# Patient Record
Sex: FEMALE | Race: WHITE | NOT HISPANIC OR LATINO | Employment: FULL TIME | URBAN - METROPOLITAN AREA
[De-identification: names, ages, dates, MRNs, and addresses within clinical notes are randomized per-mention and may not be internally consistent; named-entity substitution may affect disease eponyms.]

---

## 2020-11-20 ENCOUNTER — LAB (OUTPATIENT)
Dept: LAB | Facility: HOSPITAL | Age: 55
End: 2020-11-20

## 2020-11-20 ENCOUNTER — OFFICE VISIT (OUTPATIENT)
Dept: INTERNAL MEDICINE | Facility: CLINIC | Age: 55
End: 2020-11-20

## 2020-11-20 VITALS
HEART RATE: 90 BPM | OXYGEN SATURATION: 97 % | DIASTOLIC BLOOD PRESSURE: 102 MMHG | WEIGHT: 268.6 LBS | HEIGHT: 69 IN | BODY MASS INDEX: 39.78 KG/M2 | SYSTOLIC BLOOD PRESSURE: 172 MMHG | TEMPERATURE: 97.5 F

## 2020-11-20 DIAGNOSIS — G47.30 SLEEP APNEA WITH USE OF NOCTURNAL BILEVEL POSITIVE AIRWAY PRESSURE (BPAP): ICD-10-CM

## 2020-11-20 DIAGNOSIS — E78.5 HYPERLIPIDEMIA, UNSPECIFIED HYPERLIPIDEMIA TYPE: ICD-10-CM

## 2020-11-20 DIAGNOSIS — R73.09 ABNORMAL GLUCOSE: ICD-10-CM

## 2020-11-20 DIAGNOSIS — G62.9 PERIPHERAL POLYNEUROPATHY: ICD-10-CM

## 2020-11-20 DIAGNOSIS — F32.A ANXIETY AND DEPRESSION: ICD-10-CM

## 2020-11-20 DIAGNOSIS — R25.2 LEG CRAMPS: ICD-10-CM

## 2020-11-20 DIAGNOSIS — G25.81 RESTLESS LEG SYNDROME: ICD-10-CM

## 2020-11-20 DIAGNOSIS — Z23 NEED FOR INFLUENZA VACCINATION: ICD-10-CM

## 2020-11-20 DIAGNOSIS — I10 ESSENTIAL HYPERTENSION: Primary | ICD-10-CM

## 2020-11-20 DIAGNOSIS — Z79.899 ENCOUNTER FOR LONG-TERM (CURRENT) USE OF MEDICATIONS: ICD-10-CM

## 2020-11-20 DIAGNOSIS — F41.9 ANXIETY AND DEPRESSION: ICD-10-CM

## 2020-11-20 LAB
ALBUMIN SERPL-MCNC: 4.4 G/DL (ref 3.5–5.2)
ALBUMIN/GLOB SERPL: 1.3 G/DL
ALP SERPL-CCNC: 108 U/L (ref 39–117)
ALT SERPL W P-5'-P-CCNC: 29 U/L (ref 1–33)
AMPHET+METHAMPHET UR QL: NEGATIVE
AMPHETAMINES UR QL: NEGATIVE
ANION GAP SERPL CALCULATED.3IONS-SCNC: 7.2 MMOL/L (ref 5–15)
AST SERPL-CCNC: 26 U/L (ref 1–32)
BARBITURATES UR QL SCN: NEGATIVE
BASOPHILS # BLD AUTO: 0.08 10*3/MM3 (ref 0–0.2)
BASOPHILS NFR BLD AUTO: 1.2 % (ref 0–1.5)
BENZODIAZ UR QL SCN: NEGATIVE
BILIRUB SERPL-MCNC: 0.6 MG/DL (ref 0–1.2)
BUN SERPL-MCNC: 9 MG/DL (ref 6–20)
BUN/CREAT SERPL: 12.9 (ref 7–25)
BUPRENORPHINE SERPL-MCNC: NEGATIVE NG/ML
CALCIUM SPEC-SCNC: 9.2 MG/DL (ref 8.6–10.5)
CANNABINOIDS SERPL QL: NEGATIVE
CHLORIDE SERPL-SCNC: 98 MMOL/L (ref 98–107)
CHOLEST SERPL-MCNC: 231 MG/DL (ref 0–200)
CO2 SERPL-SCNC: 33.8 MMOL/L (ref 22–29)
COCAINE UR QL: NEGATIVE
CREAT SERPL-MCNC: 0.7 MG/DL (ref 0.57–1)
DEPRECATED RDW RBC AUTO: 36.2 FL (ref 37–54)
EOSINOPHIL # BLD AUTO: 0.44 10*3/MM3 (ref 0–0.4)
EOSINOPHIL NFR BLD AUTO: 6.4 % (ref 0.3–6.2)
ERYTHROCYTE [DISTWIDTH] IN BLOOD BY AUTOMATED COUNT: 12.6 % (ref 12.3–15.4)
GFR SERPL CREATININE-BSD FRML MDRD: 87 ML/MIN/1.73
GLOBULIN UR ELPH-MCNC: 3.5 GM/DL
GLUCOSE SERPL-MCNC: 86 MG/DL (ref 65–99)
HBA1C MFR BLD: 5.72 % (ref 4.8–5.6)
HCT VFR BLD AUTO: 41.5 % (ref 34–46.6)
HDLC SERPL-MCNC: 42 MG/DL (ref 40–60)
HGB BLD-MCNC: 14.1 G/DL (ref 12–15.9)
IMM GRANULOCYTES # BLD AUTO: 0.01 10*3/MM3 (ref 0–0.05)
IMM GRANULOCYTES NFR BLD AUTO: 0.1 % (ref 0–0.5)
LDLC SERPL CALC-MCNC: 167 MG/DL (ref 0–100)
LDLC/HDLC SERPL: 3.91 {RATIO}
LYMPHOCYTES # BLD AUTO: 1.86 10*3/MM3 (ref 0.7–3.1)
LYMPHOCYTES NFR BLD AUTO: 27.2 % (ref 19.6–45.3)
MAGNESIUM SERPL-MCNC: 2.1 MG/DL (ref 1.6–2.6)
MCH RBC QN AUTO: 27.2 PG (ref 26.6–33)
MCHC RBC AUTO-ENTMCNC: 34 G/DL (ref 31.5–35.7)
MCV RBC AUTO: 80.1 FL (ref 79–97)
METHADONE UR QL SCN: NEGATIVE
MONOCYTES # BLD AUTO: 0.44 10*3/MM3 (ref 0.1–0.9)
MONOCYTES NFR BLD AUTO: 6.4 % (ref 5–12)
NEUTROPHILS NFR BLD AUTO: 4.02 10*3/MM3 (ref 1.7–7)
NEUTROPHILS NFR BLD AUTO: 58.7 % (ref 42.7–76)
NRBC BLD AUTO-RTO: 0 /100 WBC (ref 0–0.2)
OPIATES UR QL: NEGATIVE
OXYCODONE UR QL SCN: NEGATIVE
PCP UR QL SCN: NEGATIVE
PLATELET # BLD AUTO: 271 10*3/MM3 (ref 140–450)
PMV BLD AUTO: 10.7 FL (ref 6–12)
POTASSIUM SERPL-SCNC: 3.5 MMOL/L (ref 3.5–5.2)
PROPOXYPH UR QL: NEGATIVE
PROT SERPL-MCNC: 7.9 G/DL (ref 6–8.5)
RBC # BLD AUTO: 5.18 10*6/MM3 (ref 3.77–5.28)
SODIUM SERPL-SCNC: 139 MMOL/L (ref 136–145)
T4 FREE SERPL-MCNC: 0.91 NG/DL (ref 0.93–1.7)
TRICYCLICS UR QL SCN: NEGATIVE
TRIGL SERPL-MCNC: 123 MG/DL (ref 0–150)
TSH SERPL DL<=0.05 MIU/L-ACNC: 1.94 UIU/ML (ref 0.27–4.2)
VIT B12 BLD-MCNC: 410 PG/ML (ref 211–946)
VLDLC SERPL-MCNC: 22 MG/DL (ref 5–40)
WBC # BLD AUTO: 6.85 10*3/MM3 (ref 3.4–10.8)

## 2020-11-20 PROCEDURE — 83735 ASSAY OF MAGNESIUM: CPT | Performed by: FAMILY MEDICINE

## 2020-11-20 PROCEDURE — 82607 VITAMIN B-12: CPT | Performed by: FAMILY MEDICINE

## 2020-11-20 PROCEDURE — 85025 COMPLETE CBC W/AUTO DIFF WBC: CPT | Performed by: FAMILY MEDICINE

## 2020-11-20 PROCEDURE — 80053 COMPREHEN METABOLIC PANEL: CPT | Performed by: FAMILY MEDICINE

## 2020-11-20 PROCEDURE — 84443 ASSAY THYROID STIM HORMONE: CPT | Performed by: FAMILY MEDICINE

## 2020-11-20 PROCEDURE — 80306 DRUG TEST PRSMV INSTRMNT: CPT | Performed by: FAMILY MEDICINE

## 2020-11-20 PROCEDURE — 90471 IMMUNIZATION ADMIN: CPT | Performed by: FAMILY MEDICINE

## 2020-11-20 PROCEDURE — 80061 LIPID PANEL: CPT | Performed by: FAMILY MEDICINE

## 2020-11-20 PROCEDURE — 80171 DRUG SCREEN QUANT GABAPENTIN: CPT | Performed by: FAMILY MEDICINE

## 2020-11-20 PROCEDURE — 83036 HEMOGLOBIN GLYCOSYLATED A1C: CPT | Performed by: FAMILY MEDICINE

## 2020-11-20 PROCEDURE — 90686 IIV4 VACC NO PRSV 0.5 ML IM: CPT | Performed by: FAMILY MEDICINE

## 2020-11-20 PROCEDURE — 84207 ASSAY OF VITAMIN B-6: CPT | Performed by: FAMILY MEDICINE

## 2020-11-20 PROCEDURE — 84439 ASSAY OF FREE THYROXINE: CPT | Performed by: FAMILY MEDICINE

## 2020-11-20 PROCEDURE — 99203 OFFICE O/P NEW LOW 30 MIN: CPT | Performed by: FAMILY MEDICINE

## 2020-11-20 RX ORDER — LOVASTATIN 40 MG/1
40 TABLET ORAL NIGHTLY
Qty: 30 TABLET | Refills: 3 | Status: SHIPPED | OUTPATIENT
Start: 2020-11-20 | End: 2021-03-12 | Stop reason: SDUPTHER

## 2020-11-20 RX ORDER — GABAPENTIN 800 MG/1
800 TABLET ORAL 2 TIMES DAILY
COMMUNITY
End: 2020-11-20 | Stop reason: DRUGHIGH

## 2020-11-20 RX ORDER — VENLAFAXINE HYDROCHLORIDE 150 MG/1
150 CAPSULE, EXTENDED RELEASE ORAL DAILY
COMMUNITY
End: 2020-11-20 | Stop reason: SDUPTHER

## 2020-11-20 RX ORDER — AMLODIPINE AND VALSARTAN 5; 160 MG/1; MG/1
1 TABLET ORAL DAILY
COMMUNITY
End: 2020-11-20 | Stop reason: SDUPTHER

## 2020-11-20 RX ORDER — GABAPENTIN 600 MG/1
600 TABLET ORAL 2 TIMES DAILY
Qty: 60 TABLET | Refills: 0 | Status: SHIPPED | OUTPATIENT
Start: 2020-11-20 | End: 2020-12-04 | Stop reason: SDUPTHER

## 2020-11-20 RX ORDER — HYDROCHLOROTHIAZIDE 25 MG/1
25 TABLET ORAL DAILY
Qty: 30 TABLET | Refills: 3 | Status: SHIPPED | OUTPATIENT
Start: 2020-11-20 | End: 2021-03-12 | Stop reason: SDUPTHER

## 2020-11-20 RX ORDER — ARSENIC TRIOXIDE, LYCOPODIUM CLAVATUM SPORE, PULSATILLA VULGARIS, TOXICODENDRON PUBESCENS LEAF, SULFUR, AND ZINC 12; 6; 6; 6; 6; 12 [HP_X]/1; [HP_X]/1; [HP_X]/1; [HP_X]/1; [HP_X]/1; [HP_X]/1
TABLET, SOLUBLE ORAL
COMMUNITY
End: 2021-11-10

## 2020-11-20 RX ORDER — NIFEDIPINE 30 MG/1
30 TABLET, EXTENDED RELEASE ORAL DAILY
COMMUNITY
End: 2020-11-20

## 2020-11-20 RX ORDER — AMLODIPINE AND VALSARTAN 5; 160 MG/1; MG/1
1 TABLET ORAL DAILY
Qty: 30 TABLET | Refills: 3 | Status: SHIPPED | OUTPATIENT
Start: 2020-11-20 | End: 2020-11-24

## 2020-11-20 RX ORDER — VENLAFAXINE HYDROCHLORIDE 150 MG/1
150 CAPSULE, EXTENDED RELEASE ORAL DAILY
Qty: 30 CAPSULE | Refills: 3 | Status: SHIPPED | OUTPATIENT
Start: 2020-11-20 | End: 2021-03-12 | Stop reason: SDUPTHER

## 2020-11-20 RX ORDER — CETIRIZINE HYDROCHLORIDE 10 MG/1
10 TABLET ORAL DAILY
COMMUNITY
End: 2022-10-10 | Stop reason: ALTCHOICE
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
## 2020-11-23 LAB — GABAPENTIN SERPLBLD-MCNC: 3.1 UG/ML (ref 4–16)

## 2020-11-24 ENCOUNTER — TELEPHONE (OUTPATIENT)
Dept: INTERNAL MEDICINE | Facility: CLINIC | Age: 55
End: 2020-11-24

## 2020-11-24 DIAGNOSIS — I10 ESSENTIAL HYPERTENSION: Primary | ICD-10-CM

## 2020-11-24 RX ORDER — AMLODIPINE BESYLATE 5 MG/1
5 TABLET ORAL DAILY
Qty: 30 TABLET | Refills: 3 | Status: SHIPPED | OUTPATIENT
Start: 2020-11-24 | End: 2020-12-04 | Stop reason: DRUGHIGH

## 2020-11-24 RX ORDER — VALSARTAN 160 MG/1
160 TABLET ORAL DAILY
Qty: 30 TABLET | Refills: 3 | Status: SHIPPED | OUTPATIENT
Start: 2020-11-24 | End: 2021-03-12 | Stop reason: DRUGHIGH

## 2020-11-24 NOTE — TELEPHONE ENCOUNTER
PA for exforge was sent to insurance. In between time she doesn't have anything to take and doesn't want to go too high. Wondered if we could split it and see if insurance will pay for them separately. Please send in rx to pharmacy. Thanks

## 2020-11-24 NOTE — TELEPHONE ENCOUNTER
PT IS CHECKING ON THE STATUS OF PRIOR AUTHORIZATION    amLODIPine-valsartan (EXFORGE) 5-160 MG per tablet      PT STATES THAT SHE IS  HAVING HEADACHES AND REALLY NEEDS MEDICATION.    PLEASE ADVISE  [phone]

## 2020-11-25 NOTE — TELEPHONE ENCOUNTER
LVM that rxs were sent and that we received today the approval for the exforge(combination). Advised she could either take both amlodipine and valsartan or the exforge. Which ever would be fine.

## 2020-11-30 LAB — VIT B6 SERPL-MCNC: 14.4 UG/L (ref 2–32.8)

## 2020-12-04 ENCOUNTER — OFFICE VISIT (OUTPATIENT)
Dept: INTERNAL MEDICINE | Facility: CLINIC | Age: 55
End: 2020-12-04

## 2020-12-04 VITALS
SYSTOLIC BLOOD PRESSURE: 170 MMHG | BODY MASS INDEX: 39.58 KG/M2 | TEMPERATURE: 97.5 F | DIASTOLIC BLOOD PRESSURE: 100 MMHG | WEIGHT: 267.2 LBS | HEART RATE: 92 BPM | HEIGHT: 69 IN | OXYGEN SATURATION: 96 %

## 2020-12-04 DIAGNOSIS — I10 ESSENTIAL HYPERTENSION: Primary | ICD-10-CM

## 2020-12-04 DIAGNOSIS — G25.81 RESTLESS LEG SYNDROME: ICD-10-CM

## 2020-12-04 DIAGNOSIS — Z12.31 ENCOUNTER FOR SCREENING MAMMOGRAM FOR MALIGNANT NEOPLASM OF BREAST: ICD-10-CM

## 2020-12-04 DIAGNOSIS — G62.9 PERIPHERAL POLYNEUROPATHY: ICD-10-CM

## 2020-12-04 PROCEDURE — 99214 OFFICE O/P EST MOD 30 MIN: CPT | Performed by: FAMILY MEDICINE

## 2020-12-04 RX ORDER — GABAPENTIN 600 MG/1
600 TABLET ORAL 2 TIMES DAILY
Qty: 60 TABLET | Refills: 2 | Status: SHIPPED | OUTPATIENT
Start: 2020-12-04 | End: 2021-03-12 | Stop reason: SDUPTHER

## 2020-12-04 RX ORDER — AMLODIPINE BESYLATE 10 MG/1
10 TABLET ORAL DAILY
Qty: 30 TABLET | Refills: 3 | Status: SHIPPED | OUTPATIENT
Start: 2020-12-04 | End: 2021-03-12 | Stop reason: SDUPTHER

## 2020-12-04 NOTE — PROGRESS NOTES
"Irineo Dominguez is a 55 y.o. female.     Chief Complaint   Patient presents with   • Hypertension   • Follow-up     discuss labs       Visit Vitals  /100 (BP Location: Right arm, Patient Position: Sitting, Cuff Size: Large Adult)   Pulse 92   Temp 97.5 °F (36.4 °C) (Infrared)   Ht 175.3 cm (69\")   Wt 121 kg (267 lb 3.2 oz)   SpO2 96%   BMI 39.46 kg/m²         Hypertension  This is a chronic problem. The current episode started more than 1 year ago. The problem is unchanged. The problem is uncontrolled. Pertinent negatives include no anxiety, blurred vision, chest pain, headaches, malaise/fatigue, neck pain, orthopnea, palpitations, peripheral edema, PND, shortness of breath or sweats. There are no associated agents to hypertension. Risk factors for coronary artery disease include obesity. Current antihypertension treatment includes angiotensin blockers, calcium channel blockers and diuretics. The current treatment provides moderate improvement. There are no compliance problems.  There is no history of angina, kidney disease, CAD/MI, CVA, heart failure, left ventricular hypertrophy, PVD or retinopathy. Identifiable causes of hypertension include sleep apnea. There is no history of a thyroid problem.   Neurologic Problem  The patient's pertinent negatives include no altered mental status, clumsiness, focal sensory loss, focal weakness, loss of balance, memory loss, near-syncope, slurred speech, syncope, visual change or weakness. Primary symptoms comment: peripheral neuropathy in feet. This is a chronic problem. The current episode started more than 1 year ago. The problem is unchanged. There was no focality noted. Pertinent negatives include no abdominal pain, auditory change, aura, back pain, bladder incontinence, bowel incontinence, chest pain, confusion, diaphoresis, dizziness, fatigue, fever, headaches, light-headedness, nausea, neck pain, palpitations, shortness of breath, vertigo or vomiting. " Treatments tried: gabapentin. The treatment provided significant relief. There is no history of a bleeding disorder, a clotting disorder, a CVA, dementia, head trauma, liver disease, mood changes or seizures.        The following portions of the patient's history were reviewed and updated as appropriate: allergies, current medications, past family history, past medical history, past social history, past surgical history and problem list.    Past Medical History:   Diagnosis Date   • Arthritis    • Hypertension    • Restless leg syndrome    • Sleep apnea       Past Surgical History:   Procedure Laterality Date   • CERVICAL CONE BIOPSY     •  SECTION     • COLONOSCOPY  2019    [facility]   • COLPOSCOPY     • COSMETIC SURGERY  1973   • ENDOMETRIAL ABLATION     • ENDOMETRIAL ABLATION  2019    [facility]   • KNEE CARTILAGE SURGERY Right 2018   • TUBAL ABDOMINAL LIGATION        Family History   Problem Relation Age of Onset   • Arthritis Mother    • Diabetes Mother    • Heart attack Mother    • Hypertension Mother    • Obesity Mother    • Migraines Mother    • Migraines Brother    • Stroke Brother    • Aneurysm Brother         brain   • Diabetes Maternal Grandmother    • Stroke Maternal Grandmother       Social History     Socioeconomic History   • Marital status: Single     Spouse name: Not on file   • Number of children: Not on file   • Years of education: Not on file   • Highest education level: Not on file   Tobacco Use   • Smoking status: Never Smoker   • Smokeless tobacco: Never Used   Substance and Sexual Activity   • Alcohol use: Yes   • Drug use: Never      No Known Allergies    Review of Systems   Constitutional: Negative.  Negative for chills, diaphoresis, fatigue, fever and malaise/fatigue.   HENT: Negative.  Negative for ear pain, nosebleeds, postnasal drip, rhinorrhea, sinus pressure, sneezing and sore throat.    Eyes: Negative.  Negative for blurred vision, redness and  itching.   Respiratory: Positive for apnea (using CPAP). Negative for cough, shortness of breath and wheezing.    Cardiovascular: Negative.  Negative for chest pain, palpitations, orthopnea, PND and near-syncope.   Gastrointestinal: Negative.  Negative for abdominal pain, bowel incontinence, constipation, diarrhea, nausea and vomiting.   Endocrine: Negative.  Negative for cold intolerance and heat intolerance.   Genitourinary: Negative.  Negative for bladder incontinence, dysuria, frequency, hematuria and urgency.   Musculoskeletal: Negative.  Negative for arthralgias, back pain and neck pain.   Skin: Negative.  Negative for color change and rash.   Allergic/Immunologic: Negative.  Negative for environmental allergies.   Neurological: Negative.  Negative for dizziness, vertigo, focal weakness, syncope, weakness, light-headedness, headaches and loss of balance.   Hematological: Negative.  Negative for adenopathy. Does not bruise/bleed easily.   Psychiatric/Behavioral: Negative.  Negative for confusion, dysphoric mood, memory loss and sleep disturbance. The patient is not nervous/anxious.      PHQ-2 Depression Screening  Little interest or pleasure in doing things? 0   Feeling down, depressed, or hopeless? 0   PHQ-2 Total Score 0       Pt has restarted her mevacor after labs came back.   Objective   Physical Exam  Vitals signs and nursing note reviewed.   Constitutional:       Appearance: She is well-developed.   HENT:      Head: Normocephalic.      Right Ear: External ear normal.      Left Ear: External ear normal.      Nose: Nose normal.   Eyes:      General: Lids are normal.      Conjunctiva/sclera: Conjunctivae normal.      Pupils: Pupils are equal, round, and reactive to light.   Neck:      Musculoskeletal: Normal range of motion and neck supple.      Thyroid: No thyroid mass or thyromegaly.      Vascular: No carotid bruit.      Trachea: Trachea normal.   Cardiovascular:      Rate and Rhythm: Normal rate and  regular rhythm.      Heart sounds: No murmur.   Pulmonary:      Effort: Pulmonary effort is normal. No respiratory distress.      Breath sounds: Normal breath sounds. No decreased breath sounds, wheezing, rhonchi or rales.   Chest:      Chest wall: No tenderness.   Abdominal:      General: Bowel sounds are normal.      Palpations: Abdomen is soft.      Tenderness: There is no abdominal tenderness.   Musculoskeletal: Normal range of motion.   Skin:     General: Skin is warm and dry.   Neurological:      Mental Status: She is alert and oriented to person, place, and time.   Psychiatric:         Behavior: Behavior normal.         Assessment/Plan   Diagnoses and all orders for this visit:    1. Essential hypertension (Primary)  -     amLODIPine (NORVASC) 10 MG tablet; Take 1 tablet by mouth Daily.  Dispense: 30 tablet; Refill: 3    2. Peripheral polyneuropathy  -     gabapentin (NEURONTIN) 600 MG tablet; Take 1 tablet by mouth 2 (Two) Times a Day.  Dispense: 60 tablet; Refill: 2    3. Restless leg syndrome  -     gabapentin (NEURONTIN) 600 MG tablet; Take 1 tablet by mouth 2 (Two) Times a Day.  Dispense: 60 tablet; Refill: 2    4. Encounter for screening mammogram for malignant neoplasm of breast  -     Mammo Screening Digital Tomosynthesis Bilateral With CAD; Future        Increase amlodipine to 10 mg per day           Current Outpatient Medications:   •  ASPIRIN 81 PO, Take  by mouth., Disp: , Rfl:   •  cetirizine (zyrTEC) 10 MG tablet, Take 10 mg by mouth Daily., Disp: , Rfl:   •  fluticasone (VERAMYST) 27.5 MCG/SPRAY nasal spray, 2 sprays into the nostril(s) as directed by provider Daily., Disp: , Rfl:   •  gabapentin (NEURONTIN) 600 MG tablet, Take 1 tablet by mouth 2 (Two) Times a Day., Disp: 60 tablet, Rfl: 2  •  Homeopathic Products (LEG CRAMPS PO), Take  by mouth., Disp: , Rfl:   •  Homeopathic Products (Restful Legs) sublingual tablet, Place  under the tongue., Disp: , Rfl:   •  hydroCHLOROthiazide  (HYDRODIURIL) 25 MG tablet, Take 1 tablet by mouth Daily., Disp: 30 tablet, Rfl: 3  •  lovastatin (Mevacor) 40 MG tablet, Take 1 tablet by mouth Every Night., Disp: 30 tablet, Rfl: 3  •  Potassium 99 MG tablet, Take  by mouth., Disp: , Rfl:   •  Prenatal MV-Min-Fe Fum-FA-DHA (PRENATAL 1 PO), Take  by mouth., Disp: , Rfl:   •  valsartan (Diovan) 160 MG tablet, Take 1 tablet by mouth Daily., Disp: 30 tablet, Rfl: 3  •  venlafaxine XR (EFFEXOR-XR) 150 MG 24 hr capsule, Take 1 capsule by mouth Daily., Disp: 30 capsule, Rfl: 3  •  amLODIPine (NORVASC) 10 MG tablet, Take 1 tablet by mouth Daily., Disp: 30 tablet, Rfl: 3    Return in about 3 months (around 3/4/2021), or if symptoms worsen or fail to improve, for Recheck, Annual, recheck BP 2 weeks with nurse.     Recent Results (from the past 1008 hour(s))   Urine Drug Screen - Urine, Clean Catch    Collection Time: 11/20/20 11:44 AM    Specimen: Urine, Clean Catch   Result Value Ref Range    THC, Screen, Urine Negative Negative    Phencyclidine (PCP), Urine Negative Negative    Cocaine Screen, Urine Negative Negative    Methamphetamine, Ur Negative Negative    Opiate Screen Negative Negative    Amphetamine Screen, Urine Negative Negative    Benzodiazepine Screen, Urine Negative Negative    Tricyclic Antidepressants Screen Negative Negative    Methadone Screen, Urine Negative Negative    Barbiturates Screen, Urine Negative Negative    Oxycodone Screen, Urine Negative Negative    Propoxyphene Screen Negative Negative    Buprenorphine, Screen, Urine Negative Negative   Comprehensive Metabolic Panel    Collection Time: 11/20/20 11:44 AM    Specimen: Blood   Result Value Ref Range    Glucose 86 65 - 99 mg/dL    BUN 9 6 - 20 mg/dL    Creatinine 0.70 0.57 - 1.00 mg/dL    Sodium 139 136 - 145 mmol/L    Potassium 3.5 3.5 - 5.2 mmol/L    Chloride 98 98 - 107 mmol/L    CO2 33.8 (H) 22.0 - 29.0 mmol/L    Calcium 9.2 8.6 - 10.5 mg/dL    Total Protein 7.9 6.0 - 8.5 g/dL    Albumin 4.40  3.50 - 5.20 g/dL    ALT (SGPT) 29 1 - 33 U/L    AST (SGOT) 26 1 - 32 U/L    Alkaline Phosphatase 108 39 - 117 U/L    Total Bilirubin 0.6 0.0 - 1.2 mg/dL    eGFR Non African Amer 87 >60 mL/min/1.73    Globulin 3.5 gm/dL    A/G Ratio 1.3 g/dL    BUN/Creatinine Ratio 12.9 7.0 - 25.0    Anion Gap 7.2 5.0 - 15.0 mmol/L   Lipid Panel    Collection Time: 11/20/20 11:44 AM    Specimen: Blood   Result Value Ref Range    Total Cholesterol 231 (H) 0 - 200 mg/dL    Triglycerides 123 0 - 150 mg/dL    HDL Cholesterol 42 40 - 60 mg/dL    LDL Cholesterol  167 (H) 0 - 100 mg/dL    VLDL Cholesterol 22 5 - 40 mg/dL    LDL/HDL Ratio 3.91    TSH    Collection Time: 11/20/20 11:44 AM    Specimen: Blood   Result Value Ref Range    TSH 1.940 0.270 - 4.200 uIU/mL   Vitamin B12    Collection Time: 11/20/20 11:44 AM    Specimen: Blood   Result Value Ref Range    Vitamin B-12 410 211 - 946 pg/mL   Vitamin B6    Collection Time: 11/20/20 11:44 AM    Specimen: Blood   Result Value Ref Range    Vitamin B6 14.4 2.0 - 32.8 ug/L   T4, Free    Collection Time: 11/20/20 11:44 AM    Specimen: Blood   Result Value Ref Range    Free T4 0.91 (L) 0.93 - 1.70 ng/dL   Hemoglobin A1c    Collection Time: 11/20/20 11:44 AM    Specimen: Blood   Result Value Ref Range    Hemoglobin A1C 5.72 (H) 4.80 - 5.60 %   Gabapentin level    Collection Time: 11/20/20 11:44 AM    Specimen: Blood   Result Value Ref Range    Gabapentin 3.1 (L) 4.0 - 16.0 ug/mL   Magnesium    Collection Time: 11/20/20 11:44 AM    Specimen: Blood   Result Value Ref Range    Magnesium 2.1 1.6 - 2.6 mg/dL   CBC Auto Differential    Collection Time: 11/20/20 11:44 AM    Specimen: Blood   Result Value Ref Range    WBC 6.85 3.40 - 10.80 10*3/mm3    RBC 5.18 3.77 - 5.28 10*6/mm3    Hemoglobin 14.1 12.0 - 15.9 g/dL    Hematocrit 41.5 34.0 - 46.6 %    MCV 80.1 79.0 - 97.0 fL    MCH 27.2 26.6 - 33.0 pg    MCHC 34.0 31.5 - 35.7 g/dL    RDW 12.6 12.3 - 15.4 %    RDW-SD 36.2 (L) 37.0 - 54.0 fl    MPV 10.7  6.0 - 12.0 fL    Platelets 271 140 - 450 10*3/mm3    Neutrophil % 58.7 42.7 - 76.0 %    Lymphocyte % 27.2 19.6 - 45.3 %    Monocyte % 6.4 5.0 - 12.0 %    Eosinophil % 6.4 (H) 0.3 - 6.2 %    Basophil % 1.2 0.0 - 1.5 %    Immature Grans % 0.1 0.0 - 0.5 %    Neutrophils, Absolute 4.02 1.70 - 7.00 10*3/mm3    Lymphocytes, Absolute 1.86 0.70 - 3.10 10*3/mm3    Monocytes, Absolute 0.44 0.10 - 0.90 10*3/mm3    Eosinophils, Absolute 0.44 (H) 0.00 - 0.40 10*3/mm3    Basophils, Absolute 0.08 0.00 - 0.20 10*3/mm3    Immature Grans, Absolute 0.01 0.00 - 0.05 10*3/mm3    nRBC 0.0 0.0 - 0.2 /100 WBC

## 2020-12-06 ENCOUNTER — HOSPITAL ENCOUNTER (EMERGENCY)
Facility: HOSPITAL | Age: 55
Discharge: HOME | End: 2020-12-06
Attending: FAMILY MEDICINE
Payer: MEDICAID

## 2020-12-06 VITALS
RESPIRATION RATE: 16 BRPM | OXYGEN SATURATION: 97 % | HEIGHT: 71 IN | HEART RATE: 79 BPM | DIASTOLIC BLOOD PRESSURE: 85 MMHG | BODY MASS INDEX: 37.52 KG/M2 | TEMPERATURE: 98.2 F | SYSTOLIC BLOOD PRESSURE: 165 MMHG | WEIGHT: 268 LBS

## 2020-12-06 PROCEDURE — 12001 RPR S/N/AX/GEN/TRNK 2.5CM/<: CPT

## 2020-12-06 PROCEDURE — 2500000003 HC RX 250 WO HCPCS: Performed by: FAMILY MEDICINE

## 2020-12-06 PROCEDURE — 99282 EMERGENCY DEPT VISIT SF MDM: CPT

## 2020-12-06 RX ORDER — LOVASTATIN 40 MG/1
40 TABLET ORAL NIGHTLY
COMMUNITY

## 2020-12-06 RX ORDER — HYDROCHLOROTHIAZIDE 25 MG/1
25 TABLET ORAL DAILY
COMMUNITY

## 2020-12-06 RX ORDER — GABAPENTIN 600 MG/1
600 TABLET ORAL 2 TIMES DAILY
COMMUNITY

## 2020-12-06 RX ORDER — AMLODIPINE BESYLATE 10 MG/1
10 TABLET ORAL DAILY
COMMUNITY

## 2020-12-06 RX ORDER — LIDOCAINE HYDROCHLORIDE 10 MG/ML
5 INJECTION, SOLUTION INFILTRATION; PERINEURAL ONCE
Status: COMPLETED | OUTPATIENT
Start: 2020-12-06 | End: 2020-12-06

## 2020-12-06 RX ORDER — VALSARTAN 160 MG/1
160 TABLET ORAL DAILY
COMMUNITY

## 2020-12-06 RX ORDER — VENLAFAXINE HYDROCHLORIDE 150 MG/1
150 CAPSULE, EXTENDED RELEASE ORAL DAILY
COMMUNITY

## 2020-12-06 RX ADMIN — LIDOCAINE HYDROCHLORIDE 5 ML: 10 INJECTION, SOLUTION INFILTRATION; PERINEURAL at 16:15

## 2020-12-06 ASSESSMENT — PAIN DESCRIPTION - FREQUENCY: FREQUENCY: CONTINUOUS

## 2020-12-06 ASSESSMENT — PAIN DESCRIPTION - LOCATION: LOCATION: FINGER (COMMENT WHICH ONE)

## 2020-12-06 ASSESSMENT — PAIN DESCRIPTION - PAIN TYPE: TYPE: ACUTE PAIN

## 2020-12-06 ASSESSMENT — PAIN SCALES - GENERAL
PAINLEVEL_OUTOF10: 7
PAINLEVEL_OUTOF10: 7

## 2020-12-06 ASSESSMENT — PAIN DESCRIPTION - DESCRIPTORS: DESCRIPTORS: THROBBING

## 2020-12-06 ASSESSMENT — PAIN DESCRIPTION - ORIENTATION: ORIENTATION: RIGHT

## 2020-12-06 NOTE — ED PROVIDER NOTES
cardiologist.        RADIOLOGY:   Non-plain film images such as CT, Ultrasound and MRI are read by the radiologist. Paulina Glassing images are visualized and preliminarily interpreted by the emergency physician with the below findings:        Interpretation per the Radiologist below, if available at the time of this note:    No orders to display         ED BEDSIDE ULTRASOUND:   Performed by ED Physician - none    LABS:  Labs Reviewed - No data to display    All other labs were within normal range or not returned as of this dictation. EMERGENCY DEPARTMENT COURSE and DIFFERENTIALDIAGNOSIS/MDM:   Vitals:    Vitals:    12/06/20 1540   BP: (!) 165/85   Pulse: 79   Resp: 16   Temp: 98.2 °F (36.8 °C)   TempSrc: Oral   SpO2: 97%   Weight: 268 lb (121.6 kg)   Height: 5' 11\" (1.803 m)           CRITICALCARE TIME   Total Critical Care time was 0 minutes, excludingseparately reportable procedures. There was a high probabilityof clinically significant/life threatening deterioration in the patient's condition which required my urgent intervention. CONSULTS:  None    PROCEDURES: After local anesthesia with 1% lidocaine, the wound was cleaned with normal saline and then closed with 4-0 nylon x3. Dressing was applied. None    FINAL IMPRESSION      1. Laceration of right thumb without foreign body without damage to nail, initial encounter        DISPOSITION/PLAN   DISPOSITION Decision To Discharge 12/06/2020 04:43:50 PM      PATIENT REFERRED TO:  [facility]  [facility] [address]   [facility]  [phone]  In 10 days  As needed, For wound re-check, For suture removal      DISCHARGE MEDICATIONS:  New Prescriptions    No medications on file       (Please note that portions ofthis note were completed with a voice recognition program.  Efforts were made to edit the dictations but occasionally words are mis-transcribed.)    Mac Norris MD(electronically signed)  Attending Emergency Physician          Sonu Selby MD  12/06/20 5008

## 2020-12-06 NOTE — ED NOTES
4415 discharge instructions provided to patient. Patient verbalizes understanding of POC and follow up. No new prescriptions given. Patient ambulatory off unit to POV at this time with no further needs noted.       Wil Min RN  12/06/20 7104

## 2020-12-06 NOTE — ED NOTES
Laceration to right thumb cleansed with sterile water and hibiclense. Pt florin well.      [facility] Duty  12/06/20 2727
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
## 2020-12-23 ENCOUNTER — HOSPITAL ENCOUNTER (OUTPATIENT)
Dept: MAMMOGRAPHY | Facility: HOSPITAL | Age: 55
Discharge: HOME | End: 2020-12-23
Admitting: FAMILY MEDICINE

## 2020-12-23 ENCOUNTER — APPOINTMENT (OUTPATIENT)
Dept: OTHER | Facility: HOSPITAL | Age: 55
End: 2020-12-23

## 2020-12-23 DIAGNOSIS — Z12.31 ENCOUNTER FOR SCREENING MAMMOGRAM FOR MALIGNANT NEOPLASM OF BREAST: ICD-10-CM

## 2020-12-23 PROCEDURE — 77067 SCR MAMMO BI INCL CAD: CPT | Performed by: RADIOLOGY

## 2020-12-23 PROCEDURE — 77067 SCR MAMMO BI INCL CAD: CPT

## 2020-12-23 PROCEDURE — 77063 BREAST TOMOSYNTHESIS BI: CPT

## 2020-12-23 PROCEDURE — 77063 BREAST TOMOSYNTHESIS BI: CPT | Performed by: RADIOLOGY

## 2021-01-22 ENCOUNTER — LAB (OUTPATIENT)
Dept: LAB | Facility: HOSPITAL | Age: 56
End: 2021-01-22

## 2021-01-22 ENCOUNTER — OFFICE VISIT (OUTPATIENT)
Dept: INTERNAL MEDICINE | Facility: CLINIC | Age: 56
End: 2021-01-22

## 2021-01-22 VITALS
SYSTOLIC BLOOD PRESSURE: 150 MMHG | BODY MASS INDEX: 39.99 KG/M2 | WEIGHT: 270 LBS | HEIGHT: 69 IN | DIASTOLIC BLOOD PRESSURE: 90 MMHG | HEART RATE: 83 BPM | TEMPERATURE: 97.3 F | OXYGEN SATURATION: 98 %

## 2021-01-22 DIAGNOSIS — M53.3 PAIN OF RIGHT SACROILIAC JOINT: ICD-10-CM

## 2021-01-22 DIAGNOSIS — R20.0 NUMBNESS IN BOTH LEGS: ICD-10-CM

## 2021-01-22 DIAGNOSIS — N89.8 VAGINAL ITCHING: Primary | ICD-10-CM

## 2021-01-22 DIAGNOSIS — I10 ESSENTIAL HYPERTENSION: ICD-10-CM

## 2021-01-22 DIAGNOSIS — R03.0 ELEVATED BLOOD PRESSURE READING: ICD-10-CM

## 2021-01-22 DIAGNOSIS — N76.0 VAGINOSIS: ICD-10-CM

## 2021-01-22 LAB
ANION GAP SERPL CALCULATED.3IONS-SCNC: 11.8 MMOL/L (ref 5–15)
BILIRUB BLD-MCNC: NEGATIVE MG/DL
BUN SERPL-MCNC: 17 MG/DL (ref 6–20)
BUN/CREAT SERPL: 27 (ref 7–25)
CALCIUM SPEC-SCNC: 9.2 MG/DL (ref 8.6–10.5)
CHLORIDE SERPL-SCNC: 98 MMOL/L (ref 98–107)
CLARITY, POC: ABNORMAL
CO2 SERPL-SCNC: 28.2 MMOL/L (ref 22–29)
COLOR UR: YELLOW
CREAT SERPL-MCNC: 0.63 MG/DL (ref 0.57–1)
GFR SERPL CREATININE-BSD FRML MDRD: 98 ML/MIN/1.73
GLUCOSE SERPL-MCNC: 89 MG/DL (ref 65–99)
GLUCOSE UR STRIP-MCNC: NEGATIVE MG/DL
KETONES UR QL: ABNORMAL
LEUKOCYTE EST, POC: NEGATIVE
NITRITE UR-MCNC: NEGATIVE MG/ML
PH UR: 6 [PH] (ref 5–8)
POTASSIUM SERPL-SCNC: 3.6 MMOL/L (ref 3.5–5.2)
PROT UR STRIP-MCNC: ABNORMAL MG/DL
RBC # UR STRIP: NEGATIVE /UL
SODIUM SERPL-SCNC: 138 MMOL/L (ref 136–145)
SP GR UR: 1.02 (ref 1–1.03)
UROBILINOGEN UR QL: NORMAL

## 2021-01-22 PROCEDURE — 82607 VITAMIN B-12: CPT | Performed by: FAMILY MEDICINE

## 2021-01-22 PROCEDURE — 80048 BASIC METABOLIC PNL TOTAL CA: CPT | Performed by: FAMILY MEDICINE

## 2021-01-22 PROCEDURE — 99214 OFFICE O/P EST MOD 30 MIN: CPT | Performed by: FAMILY MEDICINE

## 2021-01-22 PROCEDURE — 84443 ASSAY THYROID STIM HORMONE: CPT | Performed by: FAMILY MEDICINE

## 2021-01-22 RX ORDER — TIZANIDINE 4 MG/1
4 TABLET ORAL NIGHTLY PRN
Qty: 30 TABLET | Refills: 1 | Status: SHIPPED | OUTPATIENT
Start: 2021-01-22 | End: 2021-03-15 | Stop reason: SDUPTHER

## 2021-01-22 RX ORDER — METRONIDAZOLE 500 MG/1
2000 TABLET ORAL ONCE
Qty: 4 TABLET | Refills: 0 | Status: SHIPPED | OUTPATIENT
Start: 2021-01-22 | End: 2021-01-22
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
## 2021-01-23 LAB
TSH SERPL DL<=0.05 MIU/L-ACNC: 2.46 UIU/ML (ref 0.27–4.2)
VIT B12 BLD-MCNC: 397 PG/ML (ref 211–946)

## 2021-01-26 ENCOUNTER — PATIENT MESSAGE (OUTPATIENT)
Dept: INTERNAL MEDICINE | Facility: CLINIC | Age: 56
End: 2021-01-26

## 2021-01-26 DIAGNOSIS — G47.30 SLEEP APNEA WITH USE OF CONTINUOUS POSITIVE AIRWAY PRESSURE (CPAP): Primary | ICD-10-CM

## 2021-01-26 NOTE — TELEPHONE ENCOUNTER
Ann-Marie Leary MA 1/26/2021 9:03 AM EST      ----- Message -----  From: Zoë Dominguez  Sent: 1/26/2021 8:49 AM EST  To: [facility] [address] Clinical Pool  Subject: Non-Urgent Medical Question     Good Morning, I wear a CPAP at night for my sleep apnea is there a way that I can get orders to get new supplies for my CPAP machine I've had this machine since 2014 that was my last sleep study it gets kind of pricey by no matter pocket my supplies . Thank you for your time and have a great day

## 2021-02-23 ENCOUNTER — TELEMEDICINE (OUTPATIENT)
Dept: SLEEP MEDICINE | Facility: HOSPITAL | Age: 56
End: 2021-02-23

## 2021-02-23 DIAGNOSIS — G47.33 OBSTRUCTIVE SLEEP APNEA, ADULT: ICD-10-CM

## 2021-02-23 DIAGNOSIS — R06.83 SNORING: Primary | ICD-10-CM

## 2021-02-23 DIAGNOSIS — G47.61 PLMD (PERIODIC LIMB MOVEMENT DISORDER): ICD-10-CM

## 2021-02-23 DIAGNOSIS — E66.09 CLASS 2 OBESITY DUE TO EXCESS CALORIES WITHOUT SERIOUS COMORBIDITY WITH BODY MASS INDEX (BMI) OF 36.0 TO 36.9 IN ADULT: ICD-10-CM

## 2021-02-23 PROCEDURE — 99203 OFFICE O/P NEW LOW 30 MIN: CPT | Performed by: INTERNAL MEDICINE

## 2021-02-23 NOTE — PROGRESS NOTES
Subjective   Zoë Dominguez is a 55 y.o. female is being seen for consultation today at the request of Azul CURIEL MD for the evaluation of snoring and obstructive apnea.  You have chosen to receive care through a telehealth visit.  Do you consent to use a video/audio connection for your medical care today? Yes    History of Present Illness  Patient says she has had a history of snoring for over 20 years.  She had a previous sleep study in 2014 and when she was living in [place] and was diagnosed with obstructive sleep apnea.  She says she has been on CPAP since then.  She says she uses it nightly.  She feels rested when she uses it.    If she falls asleep without her mask she still has snoring.  She will awaken gasping.  She will awaken with a morning headache.  She thinks her weight is up significantly since her previous study.  She been buying her supplies online for the past several years and does not have a [employer] company.  She moved to this area roughly a year ago.  She uses nasal pillows mask.  She says her machines heater is not working.    She has broken her nose previously.  She denies any problems breathing through her nose.  She has seasonal environmental allergies has occasional kicking of her legs at night that may keep her awake.  She has been placed on gabapentin and that seems to help.  She has some hip pain that may keep her awake.  She will fall asleep if sitting quietly during the day.  She denies problems while driving.    Does to bed between 10 PM and midnight.  She will generally fall asleep quickly.  She awakens about 5 times during the night.  She thinks she is getting about 4 hours of sleep but is not rested even using the machine.  She has hypertension known for 14 years.  She denies any history of diabetes or coronary artery disease.      She has seasonal allergies      Current Outpatient Medications:   •  amLODIPine (NORVASC) 10 MG tablet, Take 1 tablet by mouth Daily., Disp: 30  tablet, Rfl: 3  •  ASPIRIN 81 PO, Take  by mouth., Disp: , Rfl:   •  cetirizine (zyrTEC) 10 MG tablet, Take 10 mg by mouth Daily., Disp: , Rfl:   •  fluticasone (VERAMYST) 27.5 MCG/SPRAY nasal spray, 2 sprays into the nostril(s) as directed by provider Daily., Disp: , Rfl:   •  gabapentin (NEURONTIN) 600 MG tablet, Take 1 tablet by mouth 2 (Two) Times a Day., Disp: 60 tablet, Rfl: 2  •  Homeopathic Products (LEG CRAMPS PO), Take  by mouth., Disp: , Rfl:   •  Homeopathic Products (Restful Legs) sublingual tablet, Place  under the tongue., Disp: , Rfl:   •  hydroCHLOROthiazide (HYDRODIURIL) 25 MG tablet, Take 1 tablet by mouth Daily., Disp: 30 tablet, Rfl: 3  •  lovastatin (Mevacor) 40 MG tablet, Take 1 tablet by mouth Every Night., Disp: 30 tablet, Rfl: 3  •  Potassium 99 MG tablet, Take  by mouth., Disp: , Rfl:   •  Prenatal MV-Min-Fe Fum-FA-DHA (PRENATAL 1 PO), Take  by mouth., Disp: , Rfl:   •  tiZANidine (ZANAFLEX) 4 MG tablet, Take 1 tablet by mouth At Night As Needed for Muscle Spasms., Disp: 30 tablet, Rfl: 1  •  valsartan (Diovan) 160 MG tablet, Take 1 tablet by mouth Daily., Disp: 30 tablet, Rfl: 3  •  venlafaxine XR (EFFEXOR-XR) 150 MG 24 hr capsule, Take 1 capsule by mouth Daily., Disp: 30 capsule, Rfl: 3    Social History    Tobacco Use      Smoking status: Never Smoker      Smokeless tobacco: Never Used       Social History     Substance and Sexual Activity   Alcohol Use Yes she is to having only couple of drinks per year       Caffeine: She has 2 cups of coffee and generally 1 decaf cola per day    Past Medical History:   Diagnosis Date   • Abnormal Pap smear of cervix     dysplasia, treated with cone bx   • Arthritis    • Hypertension    • Restless leg syndrome    • Sleep apnea        Past Surgical History:   Procedure Laterality Date   • CERVICAL CONE BIOPSY     •  SECTION     • COLONOSCOPY  2019    [facility]   • COLPOSCOPY     • COSMETIC SURGERY     • ENDOMETRIAL  ABLATION  2019   • ENDOMETRIAL ABLATION  11/25/2019    [facility]   • KNEE CARTILAGE SURGERY Right 04/2018   • TUBAL ABDOMINAL LIGATION  1994       Family History   Problem Relation Age of Onset   • Arthritis Mother    • Diabetes Mother    • Heart attack Mother    • Hypertension Mother    • Obesity Mother    • Migraines Mother    • Migraines Brother    • Stroke Brother    • Aneurysm Brother         brain   • Diabetes Maternal Grandmother    • Stroke Maternal Grandmother    • Breast cancer Neg Hx    • Ovarian cancer Neg Hx        The following portions of the patient's history were reviewed and updated as appropriate: allergies, current medications, past family history, past medical history, past social history, past surgical history and problem list.    Review of Systems   Constitutional: Positive for fatigue and unexpected weight change.   HENT: Positive for congestion, postnasal drip and sinus pressure.    Eyes: Negative.    Respiratory: Negative.    Cardiovascular: Positive for palpitations.   Gastrointestinal: Negative.    Endocrine: Negative.    Genitourinary: Negative.    Musculoskeletal: Positive for arthralgias, joint swelling and neck pain.   Skin: Negative.    Allergic/Immunologic: Positive for environmental allergies.   Neurological: Positive for numbness and headaches.   Hematological: Negative.    Psychiatric/Behavioral: Positive for dysphoric mood. The patient is nervous/anxious.    [place] score is 17/24    Objective     There were no vitals taken for this visit.     Physical Exam  Patient appears to be awake and alert.  She does not appear to be in acute respiratory distress.  Her stated weight is 268 pounds.  Her height is 5 feet 11 inches.  She has a body mass index of 36.  She has Mallampati class II anatomy.    Assessment/Plan   Diagnoses and all orders for this visit:    1. Snoring (Primary)  -     Home Sleep Study; Future    2. Obstructive sleep apnea, adult  -     Home Sleep Study; Future    3.  PLMD (periodic limb movement disorder)    4. Class 2 obesity due to excess calories without serious comorbidity with body mass index (BMI) of 36.0 to 36.9 in adult    Patient has a history of snoring and nonrestorative sleep.  She has been using CPAP but has gained weight since she had original study.  She also does not think her machine is working properly.  We will plan to proceed to home sleep testing to qualify her for a new machine.  We have discussed possible therapies for sleep apnea including CPAP, weight control, oral appliance, and surgery.  We have discussed the long-term consequences of untreated obstructive sleep apnea including hypertension, diabetes, heart disease, stroke, and dementia.  She is encouraged to lose weight.  She is encouraged to avoid alcohol and sedatives close to bedtime.  She is encouraged to practice lateral position sleep.  If she remains very sleepy following correction of her obstructive sleep apnea we may need to evaluate her further for periodic limb disorder.    Total time: 35 minutes was of procedures.         Adriel Renteria MD [facility]  Sleep Medicine  Pulmonary and Critical Care Medicine

## 2021-02-23 NOTE — PATIENT INSTRUCTIONS
Restless Legs Syndrome  Restless legs syndrome is a condition that causes uncomfortable feelings or sensations in the legs, especially while sitting or lying down. The sensations usually cause an overwhelming urge to move the legs. The arms can also sometimes be affected.  The condition can range from mild to severe. The symptoms often interfere with a person's ability to sleep.  What are the causes?  The cause of this condition is not known.  What increases the risk?  The following factors may make you more likely to develop this condition:  · Being older than 50.  · Pregnancy.  · Being a woman. In general, the condition is more common in women than in men.  · A family history of the condition.  · Having iron deficiency.  · Overuse of caffeine, nicotine, or alcohol.  · Certain medical conditions, such as kidney disease, Parkinson's disease, or nerve damage.  · Certain medicines, such as those for high blood pressure, nausea, colds, allergies, depression, and some heart conditions.  What are the signs or symptoms?  The main symptom of this condition is uncomfortable sensations in the legs, such as:  · Pulling.  · Tingling.  · Prickling.  · Throbbing.  · Crawling.  · Burning.  Usually, the sensations:  · Affect both sides of the body.  · Are worse when you sit or lie down.  · Are worse at night. These may wake you up or make it difficult to fall asleep.  · Make you have a strong urge to move your legs.  · Are temporarily relieved by moving your legs.  The arms can also be affected, but this is rare. People who have this condition often have tiredness during the day because of their lack of sleep at night.  How is this diagnosed?  This condition may be diagnosed based on:  · Your symptoms.  · Blood tests.  In some cases, you may be monitored in a sleep lab by a specialist (a sleep study). This can detect any disruptions in your sleep.  How is this treated?  This condition is treated by managing the symptoms. This may  include:  · Lifestyle changes, such as exercising, using relaxation techniques, and avoiding caffeine, alcohol, or tobacco.  · Medicines. Anti-seizure medicines may be tried first.  Follow these instructions at home:         General instructions  · Take over-the-counter and prescription medicines only as told by your health care provider.  · Use methods to help relieve the uncomfortable sensations, such as:  ? Massaging your legs.  ? Walking or stretching.  ? Taking a cold or hot bath.  · Keep all follow-up visits as told by your health care provider. This is important.  Lifestyle  · Practice good sleep habits. For example, go to bed and get up at the same time every day. Most adults should get 7-9 hours of sleep each night.  · Exercise regularly. Try to get at least 30 minutes of exercise most days of the week.  · Practice ways of relaxing, such as yoga or meditation.  · Avoid caffeine and alcohol.  · Do not use any products that contain nicotine or tobacco, such as cigarettes and e-cigarettes. If you need help quitting, ask your health care provider.  Contact a health care provider if:  · Your symptoms get worse or they do not improve with treatment.  Summary  · Restless legs syndrome is a condition that causes uncomfortable feelings or sensations in the legs, especially while sitting or lying down.  · The symptoms often interfere with a person's ability to sleep.  · This condition is treated by managing the symptoms. You may need to make lifestyle changes or take medicines.  This information is not intended to replace advice given to you by your health care provider. Make sure you discuss any questions you have with your health care provider.  Document Revised: 01/07/2019 Document Reviewed: 01/07/2019  Elsevier Patient Education © 2020 Else[employer] Inc.  Sleep Apnea  Sleep apnea is a condition in which breathing pauses or becomes shallow during sleep. Episodes of sleep apnea usually last 10 seconds or longer, and  they may occur as many as 20 times an hour. Sleep apnea disrupts your sleep and keeps your body from getting the rest that it needs. This condition can increase your risk of certain health problems, including:  · Heart attack.  · Stroke.  · Obesity.  · Diabetes.  · Heart failure.  · Irregular heartbeat.  What are the causes?  There are three kinds of sleep apnea:  · Obstructive sleep apnea. This kind is caused by a blocked or collapsed airway.  · Central sleep apnea. This kind happens when the part of the brain that controls breathing does not send the correct signals to the muscles that control breathing.  · Mixed sleep apnea. This is a combination of obstructive and central sleep apnea.  The most common cause of this condition is a collapsed or blocked airway. An airway can collapse or become blocked if:  · Your throat muscles are abnormally relaxed.  · Your tongue and tonsils are larger than normal.  · You are overweight.  · Your airway is smaller than normal.  What increases the risk?  You are more likely to develop this condition if you:  · Are overweight.  · Smoke.  · Have a smaller than normal airway.  · Are elderly.  · Are male.  · Drink alcohol.  · Take sedatives or tranquilizers.  · Have a family history of sleep apnea.  What are the signs or symptoms?  Symptoms of this condition include:  · Trouble staying asleep.  · Daytime sleepiness and tiredness.  · Irritability.  · Loud snoring.  · Morning headaches.  · Trouble concentrating.  · Forgetfulness.  · Decreased interest in sex.  · Unexplained sleepiness.  · Mood swings.  · Personality changes.  · Feelings of depression.  · Waking up often during the night to urinate.  · Dry mouth.  · Sore throat.  How is this diagnosed?  This condition may be diagnosed with:  · A medical history.  · A physical exam.  · A series of tests that are done while you are sleeping (sleep study). These tests are usually done in a sleep lab, but they may also be done at home.  How  is this treated?  Treatment for this condition aims to restore normal breathing and to ease symptoms during sleep. It may involve managing health issues that can affect breathing, such as high blood pressure or obesity. Treatment may include:  · Sleeping on your side.  · Using a decongestant if you have nasal congestion.  · Avoiding the use of depressants, including alcohol, sedatives, and narcotics.  · Losing weight if you are overweight.  · Making changes to your diet.  · Quitting smoking.  · Using a device to open your airway while you sleep, such as:  ? An oral appliance. This is a custom-made mouthpiece that shifts your lower jaw forward.  ? A continuous positive airway pressure (CPAP) device. This device blows air through a mask when you breathe out (exhale).  ? A nasal expiratory positive airway pressure (EPAP) device. This device has valves that you put into each nostril.  ? A bi-level positive airway pressure (BPAP) device. This device blows air through a mask when you breathe in (inhale) and breathe out (exhale).  · Having surgery if other treatments do not work. During surgery, excess tissue is removed to create a wider airway.  It is important to get treatment for sleep apnea. Without treatment, this condition can lead to:  · High blood pressure.  · Coronary artery disease.  · In men, an inability to achieve or maintain an erection (impotence).  · Reduced thinking abilities.  Follow these instructions at home:  Lifestyle  · Make any lifestyle changes that your health care provider recommends.  · Eat a healthy, well-balanced diet.  · Take steps to lose weight if you are overweight.  · Avoid using depressants, including alcohol, sedatives, and narcotics.  · Do not use any products that contain nicotine or tobacco, such as cigarettes, e-cigarettes, and chewing tobacco. If you need help quitting, ask your health care provider.  General instructions  · Take over-the-counter and prescription medicines only as  told by your health care provider.  · If you were given a device to open your airway while you sleep, use it only as told by your health care provider.  · If you are having surgery, make sure to tell your health care provider you have sleep apnea. You may need to bring your device with you.  · Keep all follow-up visits as told by your health care provider. This is important.  Contact a health care provider if:  · The device that you received to open your airway during sleep is uncomfortable or does not seem to be working.  · Your symptoms do not improve.  · Your symptoms get worse.  Get help right away if:  · You develop:  ? Chest pain.  ? Shortness of breath.  ? Discomfort in your back, arms, or stomach.  · You have:  ? Trouble speaking.  ? Weakness on one side of your body.  ? Drooping in your face.  These symptoms may represent a serious problem that is an emergency. Do not wait to see if the symptoms will go away. Get medical help right away. Call your local emergency services (911 in the [place] Do not drive yourself to the hospital.  Summary  · Sleep apnea is a condition in which breathing pauses or becomes shallow during sleep.  · The most common cause is a collapsed or blocked airway.  · The goal of treatment is to restore normal breathing and to ease symptoms during sleep.  This information is not intended to replace advice given to you by your health care provider. Make sure you discuss any questions you have with your health care provider.  Document Revised: 06/03/2020 Document Reviewed: 08/13/2019  [employer] Patient Education © 2020 Elsevier Inc.

## 2021-02-26 ENCOUNTER — HOSPITAL ENCOUNTER (OUTPATIENT)
Dept: SLEEP MEDICINE | Facility: HOSPITAL | Age: 56
Discharge: HOME | End: 2021-02-26
Admitting: INTERNAL MEDICINE

## 2021-02-26 ENCOUNTER — HOSPITAL ENCOUNTER (OUTPATIENT)
Dept: NEUROLOGY | Facility: HOSPITAL | Age: 56
Discharge: HOME | End: 2021-02-26
Admitting: FAMILY MEDICINE

## 2021-02-26 VITALS — WEIGHT: 267.33 LBS | BODY MASS INDEX: 37.43 KG/M2 | HEIGHT: 71 IN

## 2021-02-26 DIAGNOSIS — R06.83 SNORING: ICD-10-CM

## 2021-02-26 DIAGNOSIS — G47.33 OBSTRUCTIVE SLEEP APNEA, ADULT: ICD-10-CM

## 2021-02-26 DIAGNOSIS — R20.0 NUMBNESS IN BOTH LEGS: ICD-10-CM

## 2021-02-26 PROCEDURE — 95911 NRV CNDJ TEST 9-10 STUDIES: CPT

## 2021-02-26 PROCEDURE — 95806 SLEEP STUDY UNATT&RESP EFFT: CPT | Performed by: INTERNAL MEDICINE

## 2021-02-26 PROCEDURE — 95886 MUSC TEST DONE W/N TEST COMP: CPT

## 2021-02-26 PROCEDURE — 95806 SLEEP STUDY UNATT&RESP EFFT: CPT

## 2021-03-02 ENCOUNTER — TELEPHONE (OUTPATIENT)
Dept: INTERNAL MEDICINE | Facility: CLINIC | Age: 56
End: 2021-03-02

## 2021-03-02 DIAGNOSIS — I10 ESSENTIAL HYPERTENSION: ICD-10-CM

## 2021-03-02 DIAGNOSIS — E66.09 CLASS 2 OBESITY DUE TO EXCESS CALORIES WITHOUT SERIOUS COMORBIDITY WITH BODY MASS INDEX (BMI) OF 36.0 TO 36.9 IN ADULT: ICD-10-CM

## 2021-03-02 DIAGNOSIS — G47.33 OBSTRUCTIVE SLEEP APNEA, ADULT: Primary | ICD-10-CM

## 2021-03-02 DIAGNOSIS — R06.83 SNORING: ICD-10-CM

## 2021-03-02 NOTE — TELEPHONE ENCOUNTER
PN and verbalized understanding.  She has joined [employer] and working on weight loss.   Do you have any results on her sleep study.

## 2021-03-02 NOTE — TELEPHONE ENCOUNTER
----- Message from Azul CURIEL MD sent at 2/26/2021  5:38 PM EST -----  Patient has lateral femoral cutaneous neuropathy.  This is where the nerve gets pinched at the groin crease.  Weight loss is one of the treatments to get the pressure off the nerve.

## 2021-03-04 NOTE — PROGRESS NOTES
CALLED PATIENT AND ADVISED OF STUDY RESULTS. PATIENT VERBALIZED UNDERSTANDING AND WAS AGREEABLE TO PAP THERAPY. FAXED ORDER TO [facility] 03/04/21 [facility]

## 2021-03-12 ENCOUNTER — LAB (OUTPATIENT)
Dept: LAB | Facility: HOSPITAL | Age: 56
End: 2021-03-12

## 2021-03-12 ENCOUNTER — OFFICE VISIT (OUTPATIENT)
Dept: INTERNAL MEDICINE | Facility: CLINIC | Age: 56
End: 2021-03-12

## 2021-03-12 VITALS
DIASTOLIC BLOOD PRESSURE: 90 MMHG | WEIGHT: 274.2 LBS | HEART RATE: 86 BPM | SYSTOLIC BLOOD PRESSURE: 158 MMHG | TEMPERATURE: 97.1 F | OXYGEN SATURATION: 96 % | BODY MASS INDEX: 40.61 KG/M2 | HEIGHT: 69 IN

## 2021-03-12 DIAGNOSIS — E78.5 HYPERLIPIDEMIA, UNSPECIFIED HYPERLIPIDEMIA TYPE: ICD-10-CM

## 2021-03-12 DIAGNOSIS — G62.9 PERIPHERAL POLYNEUROPATHY: ICD-10-CM

## 2021-03-12 DIAGNOSIS — I10 ESSENTIAL HYPERTENSION: ICD-10-CM

## 2021-03-12 DIAGNOSIS — G25.81 RESTLESS LEG SYNDROME: ICD-10-CM

## 2021-03-12 DIAGNOSIS — E66.01 CLASS 3 SEVERE OBESITY WITH SERIOUS COMORBIDITY AND BODY MASS INDEX (BMI) OF 40.0 TO 44.9 IN ADULT, UNSPECIFIED OBESITY TYPE (HCC): Primary | ICD-10-CM

## 2021-03-12 DIAGNOSIS — F32.A ANXIETY AND DEPRESSION: ICD-10-CM

## 2021-03-12 DIAGNOSIS — F41.9 ANXIETY AND DEPRESSION: ICD-10-CM

## 2021-03-12 LAB
ALBUMIN SERPL-MCNC: 4.1 G/DL (ref 3.5–5.2)
ALBUMIN/GLOB SERPL: 1.1 G/DL
ALP SERPL-CCNC: 95 U/L (ref 39–117)
ALT SERPL W P-5'-P-CCNC: 26 U/L (ref 1–33)
ANION GAP SERPL CALCULATED.3IONS-SCNC: 9.9 MMOL/L (ref 5–15)
AST SERPL-CCNC: 26 U/L (ref 1–32)
BILIRUB SERPL-MCNC: 0.4 MG/DL (ref 0–1.2)
BUN SERPL-MCNC: 16 MG/DL (ref 6–20)
BUN/CREAT SERPL: 23.9 (ref 7–25)
CALCIUM SPEC-SCNC: 9.2 MG/DL (ref 8.6–10.5)
CHLORIDE SERPL-SCNC: 99 MMOL/L (ref 98–107)
CHOLEST SERPL-MCNC: 157 MG/DL (ref 0–200)
CO2 SERPL-SCNC: 31.1 MMOL/L (ref 22–29)
CREAT SERPL-MCNC: 0.67 MG/DL (ref 0.57–1)
GFR SERPL CREATININE-BSD FRML MDRD: 92 ML/MIN/1.73
GLOBULIN UR ELPH-MCNC: 3.7 GM/DL
GLUCOSE SERPL-MCNC: 94 MG/DL (ref 65–99)
HDLC SERPL-MCNC: 42 MG/DL (ref 40–60)
LDLC SERPL CALC-MCNC: 98 MG/DL (ref 0–100)
LDLC/HDLC SERPL: 2.3 {RATIO}
POTASSIUM SERPL-SCNC: 3.8 MMOL/L (ref 3.5–5.2)
PROT SERPL-MCNC: 7.8 G/DL (ref 6–8.5)
SODIUM SERPL-SCNC: 140 MMOL/L (ref 136–145)
TRIGL SERPL-MCNC: 93 MG/DL (ref 0–150)
VLDLC SERPL-MCNC: 17 MG/DL (ref 5–40)

## 2021-03-12 PROCEDURE — 99214 OFFICE O/P EST MOD 30 MIN: CPT | Performed by: FAMILY MEDICINE

## 2021-03-12 PROCEDURE — 80061 LIPID PANEL: CPT | Performed by: FAMILY MEDICINE

## 2021-03-12 PROCEDURE — 80053 COMPREHEN METABOLIC PANEL: CPT | Performed by: FAMILY MEDICINE

## 2021-03-12 RX ORDER — VALSARTAN 160 MG/1
160 TABLET ORAL DAILY
Qty: 30 TABLET | Refills: 3 | Status: CANCELLED | OUTPATIENT
Start: 2021-03-12

## 2021-03-12 RX ORDER — HYDROCHLOROTHIAZIDE 25 MG/1
25 TABLET ORAL DAILY
Qty: 30 TABLET | Refills: 3 | Status: SHIPPED | OUTPATIENT
Start: 2021-03-12 | End: 2021-06-18 | Stop reason: SDUPTHER

## 2021-03-12 RX ORDER — AMLODIPINE BESYLATE 10 MG/1
10 TABLET ORAL DAILY
Qty: 30 TABLET | Refills: 3 | Status: SHIPPED | OUTPATIENT
Start: 2021-03-12 | End: 2021-06-18 | Stop reason: SDUPTHER

## 2021-03-12 RX ORDER — LOVASTATIN 40 MG/1
40 TABLET ORAL NIGHTLY
Qty: 30 TABLET | Refills: 3 | Status: SHIPPED | OUTPATIENT
Start: 2021-03-12 | End: 2021-06-18 | Stop reason: SDUPTHER

## 2021-03-12 RX ORDER — VENLAFAXINE HYDROCHLORIDE 150 MG/1
150 CAPSULE, EXTENDED RELEASE ORAL DAILY
Qty: 30 CAPSULE | Refills: 3 | Status: SHIPPED | OUTPATIENT
Start: 2021-03-12 | End: 2021-06-18 | Stop reason: SDUPTHER

## 2021-03-12 RX ORDER — LANOLIN ALCOHOL/MO/W.PET/CERES
1000 CREAM (GRAM) TOPICAL DAILY
COMMUNITY
End: 2021-11-10

## 2021-03-12 RX ORDER — VALSARTAN 320 MG/1
320 TABLET ORAL DAILY
Qty: 30 TABLET | Refills: 3 | Status: SHIPPED | OUTPATIENT
Start: 2021-03-12 | End: 2021-06-18 | Stop reason: SDUPTHER

## 2021-03-12 RX ORDER — GABAPENTIN 600 MG/1
600 TABLET ORAL 2 TIMES DAILY
Qty: 60 TABLET | Refills: 2 | Status: SHIPPED | OUTPATIENT
Start: 2021-03-12 | End: 2021-06-18 | Stop reason: SDUPTHER
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
## 2021-03-15 ENCOUNTER — PATIENT MESSAGE (OUTPATIENT)
Dept: INTERNAL MEDICINE | Facility: CLINIC | Age: 56
End: 2021-03-15

## 2021-03-15 DIAGNOSIS — M53.3 PAIN OF RIGHT SACROILIAC JOINT: ICD-10-CM

## 2021-03-15 RX ORDER — TIZANIDINE 4 MG/1
4 TABLET ORAL NIGHTLY PRN
Qty: 30 TABLET | Refills: 1 | Status: SHIPPED | OUTPATIENT
Start: 2021-03-15 | End: 2021-06-18 | Stop reason: SDUPTHER

## 2021-03-15 RX ORDER — GUAIFENESIN, PSEUDOEPHEDRINE HYDROCHLORIDE 600; 60 MG/1; MG/1
1 TABLET, EXTENDED RELEASE ORAL EVERY 12 HOURS
Qty: 30 TABLET | Refills: 1 | Status: SHIPPED | OUTPATIENT
Start: 2021-03-15 | End: 2021-06-18

## 2021-03-15 NOTE — TELEPHONE ENCOUNTER
Loni Garcia MA 3/15/2021 10:46 AM EDT      ----- Message -----  From: Zoë Dominguez  Sent: 3/15/2021 10:45 AM EDT  To: [facility] [address] Clinical Pool  Subject: Complaint     Good morning Dr Atkinson. I'm having muscle spasms in my lower back. At times I can't even stand up. The spasms are keeping me up at night. Is there anything I can do? I've used cold, heat nothing is relieving it. Is there anyone I can see? I also have severe sinus pressure, coughing, drainage. I'm miserable please help.

## 2021-04-19 ENCOUNTER — OFFICE VISIT (OUTPATIENT)
Dept: INTERNAL MEDICINE | Facility: CLINIC | Age: 56
End: 2021-04-19

## 2021-04-19 VITALS
RESPIRATION RATE: 16 BRPM | DIASTOLIC BLOOD PRESSURE: 84 MMHG | TEMPERATURE: 97.9 F | SYSTOLIC BLOOD PRESSURE: 132 MMHG | HEART RATE: 71 BPM | BODY MASS INDEX: 39.43 KG/M2 | WEIGHT: 275.4 LBS | OXYGEN SATURATION: 98 % | HEIGHT: 70 IN

## 2021-04-19 DIAGNOSIS — G25.81 RESTLESS LEG SYNDROME: ICD-10-CM

## 2021-04-19 DIAGNOSIS — G62.9 PERIPHERAL POLYNEUROPATHY: ICD-10-CM

## 2021-04-19 DIAGNOSIS — E66.01 CLASS 2 SEVERE OBESITY WITH SERIOUS COMORBIDITY AND BODY MASS INDEX (BMI) OF 39.0 TO 39.9 IN ADULT, UNSPECIFIED OBESITY TYPE (HCC): ICD-10-CM

## 2021-04-19 DIAGNOSIS — Z00.00 ANNUAL PHYSICAL EXAM: Primary | ICD-10-CM

## 2021-04-19 DIAGNOSIS — Z01.419 WELL WOMAN EXAM WITH ROUTINE GYNECOLOGICAL EXAM: ICD-10-CM

## 2021-04-19 DIAGNOSIS — Z12.11 ENCOUNTER FOR SCREENING FECAL OCCULT BLOOD TESTING: ICD-10-CM

## 2021-04-19 LAB
DEVELOPER EXPIRATION DATE: NORMAL
DEVELOPER LOT NUMBER: NORMAL
EXPIRATION DATE: NORMAL
FECAL OCCULT BLOOD SCREEN, POC: NEGATIVE
Lab: NORMAL
NEGATIVE CONTROL: NEGATIVE
POSITIVE CONTROL: POSITIVE

## 2021-04-19 PROCEDURE — 82270 OCCULT BLOOD FECES: CPT | Performed by: FAMILY MEDICINE

## 2021-04-19 PROCEDURE — 99396 PREV VISIT EST AGE 40-64: CPT | Performed by: FAMILY MEDICINE

## 2021-04-19 RX ORDER — MULTIPLE VITAMINS W/ MINERALS TAB 9MG-400MCG
1 TAB ORAL DAILY
COMMUNITY

## 2021-04-19 NOTE — PROGRESS NOTES
"Irineo Dominguez is a 55 y.o. female.     Chief Complaint   Patient presents with   • Annual Exam     With PAP       Visit Vitals  /84   Pulse 71   Temp 97.9 °F (36.6 °C) (Infrared)   Resp 16   Ht 177.8 cm (70\")   Wt 125 kg (275 lb 6.4 oz)   SpO2 98%   BMI 39.52 kg/m²       Wt Readings from Last 3 Encounters:   21 125 kg (275 lb 6.4 oz)   21 124 kg (274 lb 3.2 oz)   21 121 kg (267 lb 5.3 oz)       History of Present Illness   Pt here for annual exam and pap.  Pt has started going to the gym and then had back spasm.  Pt has tried voltaren gel without improvement for back and hip pain.  Pt reports that she had a colonoscopy in 2019.  Pt has joint effusion and pain right ankle intermittently.    Pt has had both Covid vaccines.  Pt has Peripheral Neuropathy and RLS, that responds to Gabapentin.  Pt had colonoscopy in 2019 in [facility], will send for records.       The following portions of the patient's history were reviewed and updated as appropriate: allergies, current medications, past family history, past medical history, past social history, past surgical history and problem list.    Past Medical History:   Diagnosis Date   • Abnormal Pap smear of cervix     dysplasia, treated with cone bx   • Arthritis    • Hypertension    • Restless leg syndrome    • Sleep apnea       Past Surgical History:   Procedure Laterality Date   • CERVICAL CONE BIOPSY     •  SECTION     • COLONOSCOPY  2019    [facility]   • COLPOSCOPY     • COSMETIC SURGERY  1973   • ENDOMETRIAL ABLATION  2019   • ENDOMETRIAL ABLATION  2019    [facility]   • KNEE CARTILAGE SURGERY Right 2018   • TUBAL ABDOMINAL LIGATION        Family History   Problem Relation Age of Onset   • Arthritis Mother    • Diabetes Mother    • Heart attack Mother    • Hypertension Mother    • Obesity Mother    • Migraines Mother    • Migraines Brother    • Stroke Brother    • Aneurysm Brother         brain   • " Diabetes Maternal Grandmother    • Stroke Maternal Grandmother    • Breast cancer Neg Hx    • Ovarian cancer Neg Hx       Social History     Socioeconomic History   • Marital status: Single     Spouse name: Not on file   • Number of children: Not on file   • Years of education: Not on file   • Highest education level: Not on file   Tobacco Use   • Smoking status: Never Smoker   • Smokeless tobacco: Never Used   Substance and Sexual Activity   • Alcohol use: Yes     Comment: Occ   • Drug use: Never      No Known Allergies    Review of Systems   Constitutional: Negative.  Negative for activity change, appetite change, chills, diaphoresis, fatigue, fever and unexpected weight change.   HENT: Positive for nosebleeds. Negative for congestion, dental problem, drooling, ear discharge, ear pain, facial swelling, hearing loss, mouth sores, postnasal drip, rhinorrhea, sinus pressure, sinus pain, sneezing, sore throat, tinnitus, trouble swallowing and voice change.    Eyes: Negative.  Negative for photophobia, pain, discharge, redness, itching and visual disturbance.   Respiratory: Positive for apnea (using CPAP) and cough (pt attributes to allergies). Negative for choking, chest tightness, shortness of breath, wheezing and stridor.    Cardiovascular: Negative.  Negative for chest pain, palpitations and leg swelling.   Gastrointestinal: Negative.  Negative for abdominal distention, abdominal pain, anal bleeding, blood in stool, constipation, diarrhea, nausea, rectal pain and vomiting.   Endocrine: Negative.  Negative for cold intolerance, heat intolerance, polydipsia, polyphagia and polyuria.   Genitourinary: Negative.  Negative for decreased urine volume, difficulty urinating, dyspareunia, dysuria, enuresis, flank pain, frequency, genital sores, hematuria, menstrual problem, pelvic pain, urgency, vaginal bleeding, vaginal discharge and vaginal pain.        Pt has leaky bladder when she sneezed or coughs.   Musculoskeletal:  Negative.  Negative for arthralgias, back pain, gait problem, joint swelling, myalgias, neck pain and neck stiffness.   Skin: Negative.  Negative for color change, pallor, rash and wound.   Allergic/Immunologic: Positive for environmental allergies. Negative for food allergies and immunocompromised state.   Neurological: Negative.  Negative for dizziness, tremors, seizures, syncope, facial asymmetry, speech difficulty, weakness, light-headedness, numbness and headaches.   Hematological: Negative.  Negative for adenopathy. Does not bruise/bleed easily.   Psychiatric/Behavioral: Negative.  Negative for agitation, behavioral problems, confusion, decreased concentration, dysphoric mood, hallucinations, self-injury, sleep disturbance and suicidal ideas. The patient is not nervous/anxious and is not hyperactive.        Objective   Physical Exam  Vitals and nursing note reviewed.   Constitutional:       General: She is not in acute distress.     Appearance: She is well-developed. She is not diaphoretic.   HENT:      Head: Normocephalic and atraumatic.      Right Ear: Tympanic membrane, ear canal and external ear normal.      Left Ear: Tympanic membrane, ear canal and external ear normal.      Nose: Nose normal.      Mouth/Throat:      Lips: Pink.      Mouth: Mucous membranes are moist. Mucous membranes are not pale, not dry and not cyanotic. No injury.      Dentition: Normal dentition.      Tongue: No lesions.      Palate: No mass.      Pharynx: Oropharynx is clear. Uvula midline. No pharyngeal swelling, oropharyngeal exudate, posterior oropharyngeal erythema or uvula swelling.   Eyes:      General: Lids are normal. No scleral icterus.        Right eye: No foreign body, discharge or hordeolum.         Left eye: No foreign body, discharge or hordeolum.      Extraocular Movements:      Right eye: Normal extraocular motion and no nystagmus.      Left eye: Normal extraocular motion and no nystagmus.      Conjunctiva/sclera:  Conjunctivae normal.      Right eye: Right conjunctiva is not injected. No chemosis, exudate or hemorrhage.     Left eye: Left conjunctiva is not injected. No chemosis, exudate or hemorrhage.     Pupils: Pupils are equal, round, and reactive to light.   Neck:      Thyroid: No thyroid mass or thyromegaly.      Trachea: Trachea normal. No tracheal deviation.   Cardiovascular:      Rate and Rhythm: Normal rate and regular rhythm.      Pulses:           Dorsalis pedis pulses are 2+ on the right side and 2+ on the left side.        Posterior tibial pulses are 2+ on the right side and 2+ on the left side.      Heart sounds: Normal heart sounds. No murmur heard.   No friction rub. No gallop.    Pulmonary:      Effort: Pulmonary effort is normal. No respiratory distress.      Breath sounds: Normal breath sounds. No decreased breath sounds, wheezing, rhonchi or rales.   Chest:      Chest wall: No tenderness.      Breasts: Bharathi Score is 5.         Right: Normal. No swelling, bleeding, inverted nipple, mass, nipple discharge, skin change or tenderness.         Left: Normal. No swelling, bleeding, inverted nipple, mass, nipple discharge, skin change or tenderness.   Abdominal:      General: Bowel sounds are normal. There is no distension.      Palpations: Abdomen is soft. There is no hepatomegaly, splenomegaly or mass.      Tenderness: There is no abdominal tenderness. There is no guarding or rebound.      Hernia: No hernia is present. There is no hernia in the ventral area, left inguinal area or right inguinal area.   Genitourinary:     Exam position: Lithotomy position.      Pubic Area: No rash or pubic lice.       Bharathi stage (genital): 5.      Labia:         Right: No rash, tenderness, lesion or injury.         Left: No rash, tenderness, lesion or injury.       Urethra: No prolapse, urethral pain, urethral swelling or urethral lesion.      Vagina: No signs of injury and foreign body. No vaginal discharge, erythema,  tenderness, bleeding, lesions or prolapsed vaginal walls.      Cervix: No discharge, friability, lesion, erythema, cervical bleeding or eversion.      Uterus: Not deviated, not enlarged, not fixed, not tender and no uterine prolapse.       Adnexa: Right adnexa normal and left adnexa normal.        Right: No mass, tenderness or fullness.          Left: No mass, tenderness or fullness.        Rectum: Normal. Guaiac result negative. No mass, tenderness, anal fissure, external hemorrhoid or internal hemorrhoid. Normal anal tone.   Musculoskeletal:         General: No deformity. Normal range of motion.      Cervical back: Normal range of motion and neck supple.      Right ankle: Swelling present. Tenderness present.      Right Achilles Tendon: No tenderness or defects. Dickey's test negative.   Lymphadenopathy:      Head:      Right side of head: No submandibular adenopathy.      Left side of head: No submandibular adenopathy.      Cervical: No cervical adenopathy.      Right cervical: No superficial, deep or posterior cervical adenopathy.     Left cervical: No superficial, deep or posterior cervical adenopathy.      Upper Body:      Right upper body: No supraclavicular, axillary or pectoral adenopathy.      Left upper body: No supraclavicular, axillary or pectoral adenopathy.      Lower Body: No right inguinal adenopathy. No left inguinal adenopathy.   Skin:     General: Skin is warm and dry.      Findings: No rash.      Nails: There is no clubbing.   Neurological:      Mental Status: She is alert and oriented to person, place, and time.      Cranial Nerves: No cranial nerve deficit.      Sensory: No sensory deficit.      Coordination: Coordination normal.      Deep Tendon Reflexes: Reflexes are normal and symmetric.      Reflex Scores:       Bicep reflexes are 2+ on the right side and 2+ on the left side.       Brachioradialis reflexes are 2+ on the right side and 2+ on the left side.       Patellar reflexes are 2+  on the right side and 2+ on the left side.  Psychiatric:         Attention and Perception: Attention normal.         Mood and Affect: Mood and affect normal.         Speech: Speech normal.         Behavior: Behavior normal.         Thought Content: Thought content normal.         Cognition and Memory: Cognition and memory normal.         Judgment: Judgment normal.         Assessment/Plan   Diagnoses and all orders for this visit:    1. Annual physical exam (Primary)    2. Well woman exam with routine gynecological exam  -     Liquid-based Pap Smear, Screening; Future    3. Peripheral polyneuropathy  -     Ambulatory Referral to Rheumatology    4. Restless leg syndrome  -     Ambulatory Referral to Rheumatology    5. Encounter for screening fecal occult blood testing  -     POCT occult blood x 1 stool    6. Class 2 severe obesity with serious comorbidity and body mass index (BMI) of 39.0 to 39.9 in adult, unspecified obesity type (CMS/HCC)      Discussed Shingrix vaccine with pt,  that is currently available at the pharmacy.       Please follow a low animal fat diet that is also low in sugar, low in junk food, low in sweet drinks and low in alcohol.  Please increase the amount of fiber in your diet as well as increasing your daily exercise, such as walking.    Pt advised that I will not be writing for chronic controlled substances in the future. Will refer her to Rheumatology.      Handout on [facility]    Current Outpatient Medications:   •  amLODIPine (NORVASC) 10 MG tablet, Take 1 tablet by mouth Daily., Disp: 30 tablet, Rfl: 3  •  ASPIRIN 81 PO, Take  by mouth., Disp: , Rfl:   •  cetirizine (zyrTEC) 10 MG tablet, Take 10 mg by mouth Daily., Disp: , Rfl:   •  fluticasone (VERAMYST) 27.5 MCG/SPRAY nasal spray, 2 sprays into the nostril(s) as directed by provider Daily., Disp: , Rfl:   •  gabapentin (NEURONTIN) 600 MG tablet, Take 1 tablet by mouth 2 (Two) Times a Day., Disp: 60 tablet, Rfl: 2  •  Homeopathic Products  (LEG CRAMPS PO), Take  by mouth., Disp: , Rfl:   •  Homeopathic Products (Restful Legs) sublingual tablet, Place  under the tongue., Disp: , Rfl:   •  hydroCHLOROthiazide (HYDRODIURIL) 25 MG tablet, Take 1 tablet by mouth Daily., Disp: 30 tablet, Rfl: 3  •  lovastatin (Mevacor) 40 MG tablet, Take 1 tablet by mouth Every Night., Disp: 30 tablet, Rfl: 3  •  multivitamin with minerals tablet tablet, Take 1 tablet by mouth Daily., Disp: , Rfl:   •  Potassium 99 MG tablet, Take  by mouth., Disp: , Rfl:   •  pseudoephedrine-guaifenesin (MUCINEX D)  MG per 12 hr tablet, Take 1 tablet by mouth Every 12 (Twelve) Hours. For decongestant, Disp: 30 tablet, Rfl: 1  •  tiZANidine (ZANAFLEX) 4 MG tablet, Take 1 tablet by mouth At Night As Needed for Muscle Spasms., Disp: 30 tablet, Rfl: 1  •  valsartan (Diovan) 320 MG tablet, Take 1 tablet by mouth Daily., Disp: 30 tablet, Rfl: 3  •  venlafaxine XR (EFFEXOR-XR) 150 MG 24 hr capsule, Take 1 capsule by mouth Daily., Disp: 30 capsule, Rfl: 3  •  vitamin B-12 (CYANOCOBALAMIN) 1000 MCG tablet, Take 1,000 mcg by mouth Daily., Disp: , Rfl:     Return in about 3 months (around 7/19/2021), or if symptoms worsen or fail to improve, for Recheck BP.     Recent Results (from the past 1008 hour(s))   Comprehensive Metabolic Panel    Collection Time: 03/12/21  8:50 AM    Specimen: Blood   Result Value Ref Range    Glucose 94 65 - 99 mg/dL    BUN 16 6 - 20 mg/dL    Creatinine 0.67 0.57 - 1.00 mg/dL    Sodium 140 136 - 145 mmol/L    Potassium 3.8 3.5 - 5.2 mmol/L    Chloride 99 98 - 107 mmol/L    CO2 31.1 (H) 22.0 - 29.0 mmol/L    Calcium 9.2 8.6 - 10.5 mg/dL    Total Protein 7.8 6.0 - 8.5 g/dL    Albumin 4.10 3.50 - 5.20 g/dL    ALT (SGPT) 26 1 - 33 U/L    AST (SGOT) 26 1 - 32 U/L    Alkaline Phosphatase 95 39 - 117 U/L    Total Bilirubin 0.4 0.0 - 1.2 mg/dL    eGFR Non African Amer 92 >60 mL/min/1.73    Globulin 3.7 gm/dL    A/G Ratio 1.1 g/dL    BUN/Creatinine Ratio 23.9 7.0 - 25.0     Anion Gap 9.9 5.0 - 15.0 mmol/L   Lipid Panel    Collection Time: 03/12/21  8:50 AM    Specimen: Blood   Result Value Ref Range    Total Cholesterol 157 0 - 200 mg/dL    Triglycerides 93 0 - 150 mg/dL    HDL Cholesterol 42 40 - 60 mg/dL    LDL Cholesterol  98 0 - 100 mg/dL    VLDL Cholesterol 17 5 - 40 mg/dL    LDL/HDL Ratio 2.30    POCT occult blood x 1 stool    Collection Time: 04/19/21  2:45 PM    Specimen: Stool   Result Value Ref Range    Fecal Occult Blood Negative Negative    Lot Number [id number]     Expiration Date 4/30/2023     DEVELOPER LOT NUMBER [id number]     DEVELOPER EXPIRATION DATE 5/31/22     Positive Control Positive Positive    Negative Control Negative Negative

## 2021-04-20 ENCOUNTER — TELEPHONE (OUTPATIENT)
Dept: INTERNAL MEDICINE | Facility: CLINIC | Age: 56
End: 2021-04-20

## 2021-04-20 DIAGNOSIS — E78.5 HYPERLIPIDEMIA, UNSPECIFIED HYPERLIPIDEMIA TYPE: ICD-10-CM

## 2021-04-20 DIAGNOSIS — I10 ESSENTIAL HYPERTENSION: ICD-10-CM

## 2021-04-20 NOTE — TELEPHONE ENCOUNTER
Left message on machine to discuss the lab letter(pap).  __________________________________________________  Pt has positive HPV non16/non18. Pap otherwise ok.  No transition zone, but pt has had cone, which may have removed transition zone. Repeat pap next yr.

## 2021-04-21 ENCOUNTER — TELEPHONE (OUTPATIENT)
Dept: INTERNAL MEDICINE | Facility: CLINIC | Age: 56
End: 2021-04-21

## 2021-04-21 DIAGNOSIS — F41.9 ANXIETY AND DEPRESSION: ICD-10-CM

## 2021-04-21 DIAGNOSIS — F32.A ANXIETY AND DEPRESSION: ICD-10-CM

## 2021-04-21 RX ORDER — LOVASTATIN 40 MG/1
TABLET ORAL
Qty: 30 TABLET | Refills: 2 | OUTPATIENT
Start: 2021-04-21

## 2021-04-21 RX ORDER — HYDROCHLOROTHIAZIDE 25 MG/1
TABLET ORAL
Qty: 30 TABLET | Refills: 2 | OUTPATIENT
Start: 2021-04-21

## 2021-04-21 NOTE — TELEPHONE ENCOUNTER
Caller: Zoë Dominguez    Relationship: Self    Best call back number: [phone]    What is the best time to reach you: ANYTIME     Who are you requesting to speak with (clinical staff, provider,  specific staff member): CLINICAL     What was the call regarding: LAB RESULTS.  PATIENT WAS TOLD THAT TO CALL BACK BECAUSE SOMEONE WANTED TO SPEAK WITH HER ABOUT HER LAB RESULTS

## 2021-04-21 NOTE — TELEPHONE ENCOUNTER
Spoke with the patient and read her the results. She had some questions that I was unable to answer and seemed concerned about this diagnosis. She asked if you could call her back and help to explain this for her more.

## 2021-04-22 RX ORDER — VENLAFAXINE HYDROCHLORIDE 150 MG/1
CAPSULE, EXTENDED RELEASE ORAL
Qty: 30 CAPSULE | Refills: 2 | OUTPATIENT
Start: 2021-04-22

## 2021-04-23 ENCOUNTER — PATIENT MESSAGE (OUTPATIENT)
Dept: INTERNAL MEDICINE | Facility: CLINIC | Age: 56
End: 2021-04-23

## 2021-04-23 RX ORDER — FLUCONAZOLE 150 MG/1
150 TABLET ORAL ONCE
Qty: 1 TABLET | Refills: 1 | Status: SHIPPED | OUTPATIENT
Start: 2021-04-23 | End: 2021-04-23

## 2021-04-23 NOTE — TELEPHONE ENCOUNTER
From: Zoë Dominguez  To: Azul Atkinson MD  Sent: 4/23/2021 7:35 AM EDT  Subject: Complaint    Good Morning Dr Atkinson. I'm experiencing vaginal itching with a slight yeasty smell. My workplace has changed their toilet paper to the kind that I used to not be able to use and I was unaware of it and I had changed body washes this week and also wearing different kind of undergarments but I have gone back to my old body wash and cotton undergarments is there a way I could get something called into the pharmacy I have tried monistat. Thank you for your time and have a nice day and weekend

## 2021-05-04 ENCOUNTER — TELEMEDICINE (OUTPATIENT)
Dept: SLEEP MEDICINE | Facility: HOSPITAL | Age: 56
End: 2021-05-04

## 2021-05-04 VITALS — WEIGHT: 270 LBS | HEIGHT: 71 IN | BODY MASS INDEX: 37.8 KG/M2

## 2021-05-04 DIAGNOSIS — G47.33 OBSTRUCTIVE SLEEP APNEA, ADULT: Primary | ICD-10-CM

## 2021-05-04 DIAGNOSIS — G47.34 NOCTURNAL HYPOXEMIA: ICD-10-CM

## 2021-05-04 PROCEDURE — 99212 OFFICE O/P EST SF 10 MIN: CPT | Performed by: NURSE PRACTITIONER

## 2021-05-04 NOTE — PROGRESS NOTES
Chief Complaint:   Chief Complaint   Patient presents with   • Follow-up       HPI:    Zoë Dominguez is a 55 y.o. female here for follow-up of sleep apnea.  Patient was last seen 2/23/2021 in consult with a longstanding history of obstructive sleep apnea.  Patient did need to qualify for a new machine to did test with us 2/26/2021 did show severe obstructive sleep apnea as well as nocturnal hypoxemia.  Patient states she continues to do well with CPAP therapy.  She is sleeping approximately 7 hours nightly and does feel refreshed upon awakening.  Patient goes to sleep quickly and does not get up during the night.  Patient has an [place] score of 9/24.  Patient has no concerns or complaints regarding CPAP and wishes to continue therapy.        Current medications are:   Current Outpatient Medications:   •  amLODIPine (NORVASC) 10 MG tablet, Take 1 tablet by mouth Daily., Disp: 30 tablet, Rfl: 3  •  ASPIRIN 81 PO, Take  by mouth., Disp: , Rfl:   •  cetirizine (zyrTEC) 10 MG tablet, Take 10 mg by mouth Daily., Disp: , Rfl:   •  fluticasone (VERAMYST) 27.5 MCG/SPRAY nasal spray, 2 sprays into the nostril(s) as directed by provider Daily., Disp: , Rfl:   •  gabapentin (NEURONTIN) 600 MG tablet, Take 1 tablet by mouth 2 (Two) Times a Day., Disp: 60 tablet, Rfl: 2  •  Homeopathic Products (LEG CRAMPS PO), Take  by mouth., Disp: , Rfl:   •  Homeopathic Products (Restful Legs) sublingual tablet, Place  under the tongue., Disp: , Rfl:   •  hydroCHLOROthiazide (HYDRODIURIL) 25 MG tablet, Take 1 tablet by mouth Daily., Disp: 30 tablet, Rfl: 3  •  lovastatin (Mevacor) 40 MG tablet, Take 1 tablet by mouth Every Night., Disp: 30 tablet, Rfl: 3  •  multivitamin with minerals tablet tablet, Take 1 tablet by mouth Daily., Disp: , Rfl:   •  Potassium 99 MG tablet, Take  by mouth., Disp: , Rfl:   •  pseudoephedrine-guaifenesin (MUCINEX D)  MG per 12 hr tablet, Take 1 tablet by mouth Every 12 (Twelve) Hours. For  decongestant, Disp: 30 tablet, Rfl: 1  •  tiZANidine (ZANAFLEX) 4 MG tablet, Take 1 tablet by mouth At Night As Needed for Muscle Spasms., Disp: 30 tablet, Rfl: 1  •  valsartan (Diovan) 320 MG tablet, Take 1 tablet by mouth Daily., Disp: 30 tablet, Rfl: 3  •  venlafaxine XR (EFFEXOR-XR) 150 MG 24 hr capsule, Take 1 capsule by mouth Daily., Disp: 30 capsule, Rfl: 3  •  vitamin B-12 (CYANOCOBALAMIN) 1000 MCG tablet, Take 1,000 mcg by mouth Daily., Disp: , Rfl: .      The patient's relevant past medical, surgical, family and social history were reviewed and updated in Epic as appropriate.       Review of Systems   Respiratory: Positive for apnea.    Cardiovascular: Positive for palpitations.   Musculoskeletal: Positive for arthralgias and joint swelling.   Allergic/Immunologic: Positive for environmental allergies.   Neurological: Positive for numbness and headaches.   Psychiatric/Behavioral: Positive for dysphoric mood and sleep disturbance. The patient is nervous/anxious.    All other systems reviewed and are negative.        Objective:    Physical Exam  Constitutional:       Appearance: Normal appearance. She is obese.   HENT:      Head: Normocephalic and atraumatic.      Mouth/Throat:      Comments: Mallampati 2 anatomy  Pulmonary:      Effort: Pulmonary effort is normal. No respiratory distress.   Skin:     General: Skin is dry.      Coloration: Skin is not pale.      Findings: No erythema.   Neurological:      Mental Status: She is alert and oriented to person, place, and time.   Psychiatric:         Mood and Affect: Mood normal.         Behavior: Behavior normal.         Thought Content: Thought content normal.         Judgment: Judgment normal.       47/48 days of use  Greater than 4-hour use 72.9  90% pressure 10.1  AHI 1.6  Settings 8-18    ASSESSMENT/PLAN    Diagnoses and all orders for this visit:    1. Obstructive sleep apnea, adult (Primary)  -     CPAP Therapy  -     Overnight Sleep Oximetry Study;  Future    2. Nocturnal hypoxemia  -     Overnight Sleep Oximetry Study; Future            1. Counseled patient regarding multimodal approach with healthy nutrition, healthy sleep, regular physical activity, social activities, counseling, and medications. Encouraged to practice lateral sleep position. Avoid alcohol and sedatives close to bedtime.  2.   Refill supplies x1 year.  Return to clinic 1 year or sooner symptoms warrant.  Patient will have overnight oximetry while wearing CPAP and will be called with these results.  Patient gave verbal consent for video visit.  I have reviewed the results of my evaluation and impression and discussed my recommendations in detail with the patient.      Signed by  Digna Davila, LANDON    May 4, 2021      CC: Azul Atkinson MD          No ref. provider found

## 2021-05-27 ENCOUNTER — APPOINTMENT (OUTPATIENT)
Dept: MRI IMAGING | Facility: HOSPITAL | Age: 56
End: 2021-05-27

## 2021-05-27 ENCOUNTER — APPOINTMENT (OUTPATIENT)
Dept: GENERAL RADIOLOGY | Facility: HOSPITAL | Age: 56
End: 2021-05-27

## 2021-05-27 ENCOUNTER — HOSPITAL ENCOUNTER (EMERGENCY)
Facility: HOSPITAL | Age: 56
Discharge: HOME | End: 2021-05-27
Attending: EMERGENCY MEDICINE | Admitting: EMERGENCY MEDICINE

## 2021-05-27 VITALS
OXYGEN SATURATION: 96 % | TEMPERATURE: 97.8 F | SYSTOLIC BLOOD PRESSURE: 150 MMHG | BODY MASS INDEX: 38.5 KG/M2 | HEART RATE: 75 BPM | HEIGHT: 71 IN | DIASTOLIC BLOOD PRESSURE: 76 MMHG | WEIGHT: 275 LBS | RESPIRATION RATE: 18 BRPM

## 2021-05-27 DIAGNOSIS — H81.02 MENIERE DISEASE, LEFT: Primary | ICD-10-CM

## 2021-05-27 LAB
ALBUMIN SERPL-MCNC: 4.3 G/DL (ref 3.5–5.2)
ALBUMIN/GLOB SERPL: 1.1 G/DL
ALP SERPL-CCNC: 109 U/L (ref 39–117)
ALT SERPL W P-5'-P-CCNC: 35 U/L (ref 1–33)
ANION GAP SERPL CALCULATED.3IONS-SCNC: 11 MMOL/L (ref 5–15)
AST SERPL-CCNC: 29 U/L (ref 1–32)
BASOPHILS # BLD AUTO: 0.09 10*3/MM3 (ref 0–0.2)
BASOPHILS NFR BLD AUTO: 1.2 % (ref 0–1.5)
BILIRUB SERPL-MCNC: 0.3 MG/DL (ref 0–1.2)
BILIRUB UR QL STRIP: NEGATIVE
BUN SERPL-MCNC: 15 MG/DL (ref 6–20)
BUN/CREAT SERPL: 20 (ref 7–25)
CALCIUM SPEC-SCNC: 10 MG/DL (ref 8.6–10.5)
CHLORIDE SERPL-SCNC: 99 MMOL/L (ref 98–107)
CLARITY UR: CLEAR
CO2 SERPL-SCNC: 28 MMOL/L (ref 22–29)
COLOR UR: ABNORMAL
CREAT SERPL-MCNC: 0.75 MG/DL (ref 0.57–1)
DEPRECATED RDW RBC AUTO: 39.1 FL (ref 37–54)
EOSINOPHIL # BLD AUTO: 0.52 10*3/MM3 (ref 0–0.4)
EOSINOPHIL NFR BLD AUTO: 6.8 % (ref 0.3–6.2)
ERYTHROCYTE [DISTWIDTH] IN BLOOD BY AUTOMATED COUNT: 12.9 % (ref 12.3–15.4)
GFR SERPL CREATININE-BSD FRML MDRD: 81 ML/MIN/1.73
GLOBULIN UR ELPH-MCNC: 3.8 GM/DL
GLUCOSE SERPL-MCNC: 112 MG/DL (ref 65–99)
GLUCOSE UR STRIP-MCNC: NEGATIVE MG/DL
HCT VFR BLD AUTO: 43.8 % (ref 34–46.6)
HGB BLD-MCNC: 14.4 G/DL (ref 12–15.9)
HGB UR QL STRIP.AUTO: NEGATIVE
HOLD SPECIMEN: NORMAL
IMM GRANULOCYTES # BLD AUTO: 0.02 10*3/MM3 (ref 0–0.05)
IMM GRANULOCYTES NFR BLD AUTO: 0.3 % (ref 0–0.5)
KETONES UR QL STRIP: NEGATIVE
LEUKOCYTE ESTERASE UR QL STRIP.AUTO: NEGATIVE
LYMPHOCYTES # BLD AUTO: 2.06 10*3/MM3 (ref 0.7–3.1)
LYMPHOCYTES NFR BLD AUTO: 27 % (ref 19.6–45.3)
MAGNESIUM SERPL-MCNC: 2.3 MG/DL (ref 1.6–2.6)
MCH RBC QN AUTO: 27.7 PG (ref 26.6–33)
MCHC RBC AUTO-ENTMCNC: 32.9 G/DL (ref 31.5–35.7)
MCV RBC AUTO: 84.4 FL (ref 79–97)
MONOCYTES # BLD AUTO: 0.53 10*3/MM3 (ref 0.1–0.9)
MONOCYTES NFR BLD AUTO: 6.9 % (ref 5–12)
NEUTROPHILS NFR BLD AUTO: 4.41 10*3/MM3 (ref 1.7–7)
NEUTROPHILS NFR BLD AUTO: 57.8 % (ref 42.7–76)
NITRITE UR QL STRIP: NEGATIVE
NRBC BLD AUTO-RTO: 0 /100 WBC (ref 0–0.2)
PH UR STRIP.AUTO: 7 [PH] (ref 5–8)
PLATELET # BLD AUTO: 292 10*3/MM3 (ref 140–450)
PMV BLD AUTO: 9.9 FL (ref 6–12)
POTASSIUM SERPL-SCNC: 3.8 MMOL/L (ref 3.5–5.2)
PROT SERPL-MCNC: 8.1 G/DL (ref 6–8.5)
PROT UR QL STRIP: NEGATIVE
RBC # BLD AUTO: 5.19 10*6/MM3 (ref 3.77–5.28)
SODIUM SERPL-SCNC: 138 MMOL/L (ref 136–145)
SP GR UR STRIP: 1.02 (ref 1–1.03)
TROPONIN T SERPL-MCNC: <0.01 NG/ML (ref 0–0.03)
UROBILINOGEN UR QL STRIP: ABNORMAL
WBC # BLD AUTO: 7.63 10*3/MM3 (ref 3.4–10.8)
WHOLE BLOOD HOLD SPECIMEN: NORMAL
WHOLE BLOOD HOLD SPECIMEN: NORMAL

## 2021-05-27 PROCEDURE — 99284 EMERGENCY DEPT VISIT MOD MDM: CPT

## 2021-05-27 PROCEDURE — 85025 COMPLETE CBC W/AUTO DIFF WBC: CPT

## 2021-05-27 PROCEDURE — 81003 URINALYSIS AUTO W/O SCOPE: CPT | Performed by: EMERGENCY MEDICINE

## 2021-05-27 PROCEDURE — 80053 COMPREHEN METABOLIC PANEL: CPT

## 2021-05-27 PROCEDURE — 96374 THER/PROPH/DIAG INJ IV PUSH: CPT

## 2021-05-27 PROCEDURE — 93005 ELECTROCARDIOGRAM TRACING: CPT

## 2021-05-27 PROCEDURE — 71045 X-RAY EXAM CHEST 1 VIEW: CPT

## 2021-05-27 PROCEDURE — 83735 ASSAY OF MAGNESIUM: CPT

## 2021-05-27 PROCEDURE — 70551 MRI BRAIN STEM W/O DYE: CPT

## 2021-05-27 PROCEDURE — 93005 ELECTROCARDIOGRAM TRACING: CPT | Performed by: EMERGENCY MEDICINE

## 2021-05-27 PROCEDURE — 84484 ASSAY OF TROPONIN QUANT: CPT

## 2021-05-27 PROCEDURE — 25010000002 LORAZEPAM PER 2 MG: Performed by: EMERGENCY MEDICINE

## 2021-05-27 RX ORDER — LORAZEPAM 2 MG/ML
1 INJECTION INTRAMUSCULAR ONCE
Status: COMPLETED | OUTPATIENT
Start: 2021-05-27 | End: 2021-05-27

## 2021-05-27 RX ORDER — MECLIZINE HYDROCHLORIDE 25 MG/1
25 TABLET ORAL EVERY 6 HOURS PRN
Qty: 30 TABLET | Refills: 0 | Status: SHIPPED | OUTPATIENT
Start: 2021-05-27 | End: 2021-06-18

## 2021-05-27 RX ORDER — SODIUM CHLORIDE 0.9 % (FLUSH) 0.9 %
10 SYRINGE (ML) INJECTION AS NEEDED
Status: DISCONTINUED | OUTPATIENT
Start: 2021-05-27 | End: 2021-05-27 | Stop reason: HOSPADM

## 2021-05-27 RX ADMIN — LORAZEPAM 1 MG: 2 INJECTION INTRAMUSCULAR; INTRAVENOUS at 10:36

## 2021-05-28 LAB
QT INTERVAL: 386 MS
QTC INTERVAL: 431 MS

## 2021-06-18 ENCOUNTER — OFFICE VISIT (OUTPATIENT)
Dept: INTERNAL MEDICINE | Facility: CLINIC | Age: 56
End: 2021-06-18

## 2021-06-18 VITALS
HEIGHT: 71 IN | OXYGEN SATURATION: 98 % | SYSTOLIC BLOOD PRESSURE: 128 MMHG | HEART RATE: 76 BPM | BODY MASS INDEX: 38.89 KG/M2 | DIASTOLIC BLOOD PRESSURE: 86 MMHG | WEIGHT: 277.8 LBS | RESPIRATION RATE: 18 BRPM

## 2021-06-18 DIAGNOSIS — E78.5 HYPERLIPIDEMIA, UNSPECIFIED HYPERLIPIDEMIA TYPE: ICD-10-CM

## 2021-06-18 DIAGNOSIS — I10 ESSENTIAL HYPERTENSION: ICD-10-CM

## 2021-06-18 DIAGNOSIS — F32.A ANXIETY AND DEPRESSION: ICD-10-CM

## 2021-06-18 DIAGNOSIS — G25.81 RESTLESS LEG SYNDROME: ICD-10-CM

## 2021-06-18 DIAGNOSIS — M53.3 PAIN OF RIGHT SACROILIAC JOINT: ICD-10-CM

## 2021-06-18 DIAGNOSIS — G62.9 PERIPHERAL POLYNEUROPATHY: ICD-10-CM

## 2021-06-18 DIAGNOSIS — F41.9 ANXIETY AND DEPRESSION: ICD-10-CM

## 2021-06-18 PROCEDURE — 99214 OFFICE O/P EST MOD 30 MIN: CPT | Performed by: FAMILY MEDICINE

## 2021-06-18 RX ORDER — LOVASTATIN 40 MG/1
40 TABLET ORAL NIGHTLY
Qty: 30 TABLET | Refills: 3 | Status: SHIPPED | OUTPATIENT
Start: 2021-06-18 | End: 2021-11-10 | Stop reason: SDUPTHER

## 2021-06-18 RX ORDER — HYDROCHLOROTHIAZIDE 25 MG/1
25 TABLET ORAL DAILY
Qty: 30 TABLET | Refills: 3 | Status: SHIPPED | OUTPATIENT
Start: 2021-06-18 | End: 2021-11-10 | Stop reason: SDUPTHER

## 2021-06-18 RX ORDER — VENLAFAXINE HYDROCHLORIDE 150 MG/1
150 CAPSULE, EXTENDED RELEASE ORAL DAILY
Qty: 30 CAPSULE | Refills: 3 | Status: SHIPPED | OUTPATIENT
Start: 2021-06-18 | End: 2021-09-03

## 2021-06-18 RX ORDER — GABAPENTIN 600 MG/1
600 TABLET ORAL 2 TIMES DAILY
Qty: 60 TABLET | Refills: 2 | Status: SHIPPED | OUTPATIENT
Start: 2021-06-18 | End: 2021-10-07 | Stop reason: SDUPTHER

## 2021-06-18 RX ORDER — BUPROPION HYDROCHLORIDE 150 MG/1
150 TABLET ORAL DAILY
Qty: 30 TABLET | Refills: 1 | Status: SHIPPED | OUTPATIENT
Start: 2021-06-18 | End: 2021-09-03 | Stop reason: SDUPTHER

## 2021-06-18 RX ORDER — AMLODIPINE BESYLATE 10 MG/1
10 TABLET ORAL DAILY
Qty: 30 TABLET | Refills: 3 | Status: SHIPPED | OUTPATIENT
Start: 2021-06-18 | End: 2021-11-10 | Stop reason: SDUPTHER

## 2021-06-18 RX ORDER — TIZANIDINE 4 MG/1
4 TABLET ORAL NIGHTLY PRN
Qty: 30 TABLET | Refills: 1 | Status: SHIPPED | OUTPATIENT
Start: 2021-06-18 | End: 2021-11-10 | Stop reason: SDUPTHER

## 2021-06-18 RX ORDER — VALSARTAN 320 MG/1
320 TABLET ORAL DAILY
Qty: 30 TABLET | Refills: 3 | Status: SHIPPED | OUTPATIENT
Start: 2021-06-18 | End: 2021-11-10 | Stop reason: SDUPTHER

## 2021-06-18 NOTE — PROGRESS NOTES
"Irineo Dominguez is a 55 y.o. female.     Chief Complaint   Patient presents with   • Hypertension     3 month F/U    • Peripheral polyneuropathy   • Hyperlipidemia   • Med Refill     All chronic meds        Visit Vitals  /86   Pulse 76   Resp 18   Ht 180.3 cm (71\")   Wt 126 kg (277 lb 12.8 oz)   SpO2 98%   BMI 38.75 kg/m²       Wt Readings from Last 3 Encounters:   06/18/21 126 kg (277 lb 12.8 oz)   05/27/21 125 kg (275 lb)   05/04/21 122 kg (270 lb)       Depression  Visit Type: follow-up  Patient presents with the following symptoms: anhedonia, depressed mood, dizziness, excessive worry, fatigue, feelings of hopelessness, feelings of worthlessness, hypersomnia, hyperventilation, insomnia, irritability, malaise, nervousness/anxiety, panic, suicidal planning and thoughts of death.  Patient is not experiencing: chest pain, choking sensation, compulsions, confusion, decreased concentration, dry mouth, memory impairment, muscle tension, nausea, obsessions, palpitations, psychomotor agitation, psychomotor retardation, restlessness, shortness of breath, suicidal ideas, weight gain and weight loss.  Frequency of symptoms: constantly   Severity: moderate   Nighttime awakenings: several  Compliance with medications:  %        Anxiety  Presents for follow-up visit. Symptoms include depressed mood, dizziness, excessive worry, hyperventilation, insomnia, irritability, malaise, nervous/anxious behavior and panic. Patient reports no chest pain, compulsions, confusion, decreased concentration, dry mouth, feeling of choking, muscle tension, nausea, obsessions, palpitations, restlessness, shortness of breath or suicidal ideas. Symptoms occur constantly. The severity of symptoms is moderate. The quality of sleep is poor. Nighttime awakenings: several.     Her past medical history is significant for depression. Compliance with medications is %.   Hypertension  This is a chronic problem. The current " episode started more than 1 year ago. The problem is unchanged. The problem is controlled. Associated symptoms include anxiety, malaise/fatigue and neck pain. Pertinent negatives include no blurred vision, chest pain, headaches, orthopnea, palpitations, peripheral edema, PND, shortness of breath or sweats. There are no associated agents to hypertension. Risk factors for coronary artery disease include obesity, post-menopausal state and stress. Current antihypertension treatment includes calcium channel blockers, diuretics and angiotensin blockers. The current treatment provides significant improvement. Compliance problems include diet and exercise.  There is no history of angina, kidney disease, CAD/MI, CVA, heart failure, left ventricular hypertrophy, PVD or retinopathy. Identifiable causes of hypertension include sleep apnea. There is no history of chronic renal disease or a thyroid problem.   Hyperlipidemia  This is a chronic problem. The current episode started more than 1 year ago. The problem is controlled. Recent lipid tests were reviewed and are normal. Exacerbating diseases include obesity. She has no history of chronic renal disease, diabetes, hypothyroidism, liver disease or nephrotic syndrome. Pertinent negatives include no chest pain, focal sensory loss, focal weakness, leg pain, myalgias or shortness of breath. Current antihyperlipidemic treatment includes statins. The current treatment provides significant improvement of lipids. Compliance problems include adherence to diet and adherence to exercise.  Risk factors for coronary artery disease include dyslipidemia, family history, hypertension, obesity and post-menopausal.        Pt was in ER on 5/27/21 with dizziness and swishing sound in her ear. Pt had MRI in the brain.    Pt went to [facility] on 6/3/21 with left sided weakness and similar dizziness. Pt had CTA head, duplex of carotid and echocardiogram that were negative.  Pt stayed in [facility]  from 6/3/21 until 21.   Pt was seen by Neurology. Pt reports that her blood pressure was high.    Pt had quit her job before she went to ER.  Pt is now working at [employer] in [place].     Pt is having symptoms of depression. Pt is socially isolating and crying a lot. Pt denies suicidal ideation. Pt does like her new job.   Pt had abilify added to her med in the past and it caused tremors.  Pt has been on Wellbutrin by itself without improvement.    Pt has not made the Rheumatology appt.     Pt has a cyst on her right medial thigh that she has had for yrs, that itches and she would like it removed.     Pt has peripheral neuropathy that causes pain and tingling in her legs. Pt tried a weighted blanket which help restless legs.           The following portions of the patient's history were reviewed and updated as appropriate: allergies, current medications, past family history, past medical history, past social history, past surgical history and problem list.    Past Medical History:   Diagnosis Date   • Abnormal Pap smear of cervix     dysplasia, treated with cone bx   • Acute left-sided muscle weakness 2021    [facility]   • Arthritis    • Compression fracture of cervical spine (CMS/HCC) 2021    CTA at [facility]   • Depression    • Hypertension    • Peripheral neuropathy    • Restless leg syndrome    • Restless leg syndrome    • Sleep apnea    • Sleep apnea in adult       Past Surgical History:   Procedure Laterality Date   • CERVICAL CONE BIOPSY     •  SECTION     • COLONOSCOPY  2019    [facility]   • COLPOSCOPY     • COSMETIC SURGERY  1973   • ENDOMETRIAL ABLATION     • ENDOMETRIAL ABLATION  2019    [facility]   • KNEE CARTILAGE SURGERY Right 2018   • TUBAL ABDOMINAL LIGATION        Family History   Problem Relation Age of Onset   • Arthritis Mother    • Diabetes Mother    • Heart attack Mother    • Hypertension Mother    • Obesity Mother    •  Migraines Mother    • Migraines Brother    • Stroke Brother    • Aneurysm Brother         brain   • Diabetes Maternal Grandmother    • Stroke Maternal Grandmother    • Breast cancer Neg Hx    • Ovarian cancer Neg Hx       Social History     Socioeconomic History   • Marital status: Single     Spouse name: Not on file   • Number of children: Not on file   • Years of education: Not on file   • Highest education level: Not on file   Tobacco Use   • Smoking status: Never Smoker   • Smokeless tobacco: Never Used   Vaping Use   • Vaping Use: Never used   Substance and Sexual Activity   • Alcohol use: Yes     Comment: seldom   • Drug use: Never   • Sexual activity: Yes     Partners: Male      Allergies   Allergen Reactions   • Abilify [Aripiprazole] Other (See Comments)     tremors       Review of Systems   Constitutional: Positive for diaphoresis, fatigue, irritability and malaise/fatigue. Negative for weight gain and weight loss.   Eyes: Negative for blurred vision.   Respiratory: Negative for choking and shortness of breath.    Cardiovascular: Negative for chest pain, palpitations, orthopnea and PND.   Gastrointestinal: Negative for abdominal pain and nausea.   Musculoskeletal: Positive for neck pain. Negative for myalgias.   Neurological: Positive for dizziness and numbness. Negative for focal weakness and headaches.   Psychiatric/Behavioral: Positive for dysphoric mood and sleep disturbance. Negative for confusion, decreased concentration and suicidal ideas. The patient is nervous/anxious and has insomnia.        Objective   Physical Exam    Assessment/Plan   Diagnoses and all orders for this visit:    1. Essential hypertension  -     amLODIPine (NORVASC) 10 MG tablet; Take 1 tablet by mouth Daily.  Dispense: 30 tablet; Refill: 3  -     hydroCHLOROthiazide (HYDRODIURIL) 25 MG tablet; Take 1 tablet by mouth Daily.  Dispense: 30 tablet; Refill: 3  -     valsartan (Diovan) 320 MG tablet; Take 1 tablet by mouth Daily.   Dispense: 30 tablet; Refill: 3    2. Pain of right sacroiliac joint  -     tiZANidine (ZANAFLEX) 4 MG tablet; Take 1 tablet by mouth At Night As Needed for Muscle Spasms.  Dispense: 30 tablet; Refill: 1    3. Hyperlipidemia, unspecified hyperlipidemia type  -     lovastatin (MEVACOR) 40 MG tablet; Take 1 tablet by mouth Every Night.  Dispense: 30 tablet; Refill: 3    4. Peripheral polyneuropathy  -     gabapentin (NEURONTIN) 600 MG tablet; Take 1 tablet by mouth 2 (Two) Times a Day.  Dispense: 60 tablet; Refill: 2    5. Restless leg syndrome  -     gabapentin (NEURONTIN) 600 MG tablet; Take 1 tablet by mouth 2 (Two) Times a Day.  Dispense: 60 tablet; Refill: 2    6. Anxiety and depression  -     buPROPion XL (Wellbutrin XL) 150 MG 24 hr tablet; Take 1 tablet by mouth Daily.  Dispense: 30 tablet; Refill: 1  -     venlafaxine XR (EFFEXOR-XR) 150 MG 24 hr capsule; Take 1 capsule by mouth Daily.  Dispense: 30 capsule; Refill: 3  -     Ambulatory Referral to Behavioral Health                   Current Outpatient Medications:   •  amLODIPine (NORVASC) 10 MG tablet, Take 1 tablet by mouth Daily., Disp: 30 tablet, Rfl: 3  •  ASPIRIN 81 PO, Take  by mouth., Disp: , Rfl:   •  cetirizine (zyrTEC) 10 MG tablet, Take 10 mg by mouth Daily., Disp: , Rfl:   •  fluticasone (VERAMYST) 27.5 MCG/SPRAY nasal spray, 2 sprays into the nostril(s) as directed by provider Daily., Disp: , Rfl:   •  gabapentin (NEURONTIN) 600 MG tablet, Take 1 tablet by mouth 2 (Two) Times a Day., Disp: 60 tablet, Rfl: 2  •  Homeopathic Products (LEG CRAMPS PO), Take  by mouth., Disp: , Rfl:   •  Homeopathic Products (Restful Legs) sublingual tablet, Place  under the tongue., Disp: , Rfl:   •  hydroCHLOROthiazide (HYDRODIURIL) 25 MG tablet, Take 1 tablet by mouth Daily., Disp: 30 tablet, Rfl: 3  •  lovastatin (MEVACOR) 40 MG tablet, Take 1 tablet by mouth Every Night., Disp: 30 tablet, Rfl: 3  •  multivitamin with minerals tablet tablet, Take 1 tablet by mouth  Daily., Disp: , Rfl:   •  Potassium 99 MG tablet, Take  by mouth., Disp: , Rfl:   •  tiZANidine (ZANAFLEX) 4 MG tablet, Take 1 tablet by mouth At Night As Needed for Muscle Spasms., Disp: 30 tablet, Rfl: 1  •  valsartan (Diovan) 320 MG tablet, Take 1 tablet by mouth Daily., Disp: 30 tablet, Rfl: 3  •  venlafaxine XR (EFFEXOR-XR) 150 MG 24 hr capsule, Take 1 capsule by mouth Daily., Disp: 30 capsule, Rfl: 3  •  vitamin B-12 (CYANOCOBALAMIN) 1000 MCG tablet, Take 1,000 mcg by mouth Daily., Disp: , Rfl:   •  buPROPion XL (Wellbutrin XL) 150 MG 24 hr tablet, Take 1 tablet by mouth Daily., Disp: 30 tablet, Rfl: 1    Return in about 4 weeks (around 7/16/2021), or if symptoms worsen or fail to improve, for Recheck.

## 2021-08-26 ENCOUNTER — PATIENT MESSAGE (OUTPATIENT)
Dept: INTERNAL MEDICINE | Facility: CLINIC | Age: 56
End: 2021-08-26

## 2021-08-26 DIAGNOSIS — F41.9 ANXIETY AND DEPRESSION: ICD-10-CM

## 2021-08-26 DIAGNOSIS — F32.A ANXIETY AND DEPRESSION: ICD-10-CM

## 2021-09-03 RX ORDER — BUPROPION HYDROCHLORIDE 150 MG/1
150 TABLET ORAL DAILY
Qty: 30 TABLET | Refills: 1 | Status: SHIPPED | OUTPATIENT
Start: 2021-09-03 | End: 2021-11-10 | Stop reason: SDUPTHER

## 2021-09-03 RX ORDER — FLUOXETINE HYDROCHLORIDE 20 MG/1
20 CAPSULE ORAL DAILY
Qty: 30 CAPSULE | Refills: 1 | Status: SHIPPED | OUTPATIENT
Start: 2021-09-03 | End: 2021-09-17 | Stop reason: DRUGHIGH

## 2021-09-13 ENCOUNTER — PATIENT MESSAGE (OUTPATIENT)
Dept: INTERNAL MEDICINE | Facility: CLINIC | Age: 56
End: 2021-09-13

## 2021-09-13 DIAGNOSIS — R61 GENERALIZED HYPERHIDROSIS: Primary | ICD-10-CM

## 2021-09-13 NOTE — TELEPHONE ENCOUNTER
Sheila Pack 9/13/2021 1:40 PM EDT    Would you like her to schedule a follow up to discuss  ----- Message -----  From: Zoë Dominguez  Sent: 9/13/2021 9:29 AM EDT  To: [address] [facility]  Subject: Prescription Question     Good morning . I just wanted to let you know that the prozac isn't working. I went back on the effexor XR. Can we please just bring me off of it Prozac does not work I was having bad side effects coming off of it. It really mess with my balance my my hearing things were spinning I just felt bad all over and I took it for about 4 days and then I came off of it and went back on effexor.

## 2021-09-15 ENCOUNTER — LAB (OUTPATIENT)
Dept: LAB | Facility: HOSPITAL | Age: 56
End: 2021-09-15

## 2021-09-15 DIAGNOSIS — R61 GENERALIZED HYPERHIDROSIS: ICD-10-CM

## 2021-09-15 LAB
ESTRADIOL SERPL HS-MCNC: <5 PG/ML
FSH SERPL-ACNC: 33.1 MIU/ML
LH SERPL-ACNC: 24.1 MIU/ML
T4 FREE SERPL-MCNC: 0.82 NG/DL (ref 0.93–1.7)
TSH SERPL DL<=0.05 MIU/L-ACNC: 2.77 UIU/ML (ref 0.27–4.2)

## 2021-09-15 PROCEDURE — 83002 ASSAY OF GONADOTROPIN (LH): CPT | Performed by: FAMILY MEDICINE

## 2021-09-15 PROCEDURE — 84443 ASSAY THYROID STIM HORMONE: CPT | Performed by: FAMILY MEDICINE

## 2021-09-15 PROCEDURE — 84439 ASSAY OF FREE THYROXINE: CPT | Performed by: FAMILY MEDICINE

## 2021-09-15 PROCEDURE — 82670 ASSAY OF TOTAL ESTRADIOL: CPT | Performed by: FAMILY MEDICINE

## 2021-09-15 PROCEDURE — 83001 ASSAY OF GONADOTROPIN (FSH): CPT | Performed by: FAMILY MEDICINE

## 2021-09-17 ENCOUNTER — TELEPHONE (OUTPATIENT)
Dept: INTERNAL MEDICINE | Facility: CLINIC | Age: 56
End: 2021-09-17

## 2021-09-17 DIAGNOSIS — F32.A ANXIETY AND DEPRESSION: Primary | ICD-10-CM

## 2021-09-17 DIAGNOSIS — F41.9 ANXIETY AND DEPRESSION: Primary | ICD-10-CM

## 2021-09-17 RX ORDER — FLUOXETINE HYDROCHLORIDE 40 MG/1
40 CAPSULE ORAL DAILY
Qty: 30 CAPSULE | Refills: 1 | Status: SHIPPED | OUTPATIENT
Start: 2021-09-17 | End: 2021-10-07 | Stop reason: ALTCHOICE

## 2021-09-17 NOTE — TELEPHONE ENCOUNTER
Called and spoke with patient, informed her of Dr. Atkinson's recommendations. She is agreeable to increasing her fluoxetine. New prescription can be sent to [facility] in chart.

## 2021-09-17 NOTE — TELEPHONE ENCOUNTER
----- Message from Azul CURIEL MD sent at 9/16/2021  6:44 PM EDT -----  Thyroid-stimulating hormone normal.  Free T4 has decreased.  If she is having a lot of fatigue we should add thyroid hormone.  Female hormones look like early perimenopausal

## 2021-09-17 NOTE — TELEPHONE ENCOUNTER
PN she verbalized understanding.  She is not sleeping and is taking biotin.  She does sweat a lot.

## 2021-09-23 DIAGNOSIS — I10 ESSENTIAL HYPERTENSION: ICD-10-CM

## 2021-09-23 DIAGNOSIS — F32.A ANXIETY AND DEPRESSION: ICD-10-CM

## 2021-09-23 DIAGNOSIS — G25.81 RESTLESS LEG SYNDROME: ICD-10-CM

## 2021-09-23 DIAGNOSIS — F41.9 ANXIETY AND DEPRESSION: ICD-10-CM

## 2021-09-23 DIAGNOSIS — E78.5 HYPERLIPIDEMIA, UNSPECIFIED HYPERLIPIDEMIA TYPE: ICD-10-CM

## 2021-09-23 DIAGNOSIS — G62.9 PERIPHERAL POLYNEUROPATHY: ICD-10-CM

## 2021-09-24 RX ORDER — GABAPENTIN 600 MG/1
600 TABLET ORAL 2 TIMES DAILY
Qty: 60 TABLET | Refills: 2 | OUTPATIENT
Start: 2021-09-24

## 2021-09-24 RX ORDER — VALSARTAN 320 MG/1
320 TABLET ORAL DAILY
Qty: 30 TABLET | Refills: 3 | OUTPATIENT
Start: 2021-09-24

## 2021-09-24 RX ORDER — LOVASTATIN 40 MG/1
40 TABLET ORAL NIGHTLY
Qty: 30 TABLET | Refills: 3 | OUTPATIENT
Start: 2021-09-24

## 2021-09-24 RX ORDER — BUPROPION HYDROCHLORIDE 150 MG/1
150 TABLET ORAL DAILY
Qty: 30 TABLET | Refills: 1 | OUTPATIENT
Start: 2021-09-24

## 2021-09-24 NOTE — TELEPHONE ENCOUNTER
Last seen 6/18.  No scheduled appointment.   Valsartan last filled 6/18 with 3 refills, lovastatin 6/18 with 3 refills, bupropioin 9/3 with 1 refill.  Gabapentin was filled on 6/18 with 2 refills.  An appointment is required for the gabapentin due to being a controlled substance.  The other meds should have refill at pharmacy.    LM on VM to discuss.  I told her that I would also sent a [employer] ! message to let her know

## 2021-10-01 DIAGNOSIS — G25.81 RESTLESS LEG SYNDROME: ICD-10-CM

## 2021-10-01 DIAGNOSIS — G62.9 PERIPHERAL POLYNEUROPATHY: ICD-10-CM

## 2021-10-01 RX ORDER — GABAPENTIN 600 MG/1
TABLET ORAL
Qty: 60 TABLET | OUTPATIENT
Start: 2021-10-01

## 2021-10-07 ENCOUNTER — OFFICE VISIT (OUTPATIENT)
Dept: INTERNAL MEDICINE | Facility: CLINIC | Age: 56
End: 2021-10-07

## 2021-10-07 VITALS
HEART RATE: 90 BPM | BODY MASS INDEX: 39.31 KG/M2 | SYSTOLIC BLOOD PRESSURE: 138 MMHG | OXYGEN SATURATION: 98 % | TEMPERATURE: 96.9 F | DIASTOLIC BLOOD PRESSURE: 74 MMHG | WEIGHT: 280.8 LBS | HEIGHT: 71 IN

## 2021-10-07 DIAGNOSIS — G62.9 PERIPHERAL POLYNEUROPATHY: ICD-10-CM

## 2021-10-07 DIAGNOSIS — M54.12 CERVICAL RADICULOPATHY: Primary | ICD-10-CM

## 2021-10-07 DIAGNOSIS — G25.81 RESTLESS LEG SYNDROME: ICD-10-CM

## 2021-10-07 PROCEDURE — 99214 OFFICE O/P EST MOD 30 MIN: CPT | Performed by: INTERNAL MEDICINE

## 2021-10-07 RX ORDER — VENLAFAXINE HYDROCHLORIDE 150 MG/1
1 CAPSULE, EXTENDED RELEASE ORAL DAILY
COMMUNITY
Start: 2021-09-23 | End: 2021-12-28

## 2021-10-07 RX ORDER — GABAPENTIN 600 MG/1
600 TABLET ORAL 2 TIMES DAILY
Qty: 60 TABLET | Refills: 0 | Status: SHIPPED | OUTPATIENT
Start: 2021-10-07 | End: 2021-11-10 | Stop reason: SDUPTHER

## 2021-10-07 RX ORDER — METHYLPREDNISOLONE 4 MG/1
TABLET ORAL
Qty: 21 TABLET | Refills: 0 | Status: SHIPPED | OUTPATIENT
Start: 2021-10-07 | End: 2021-11-10
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
## 2021-11-10 ENCOUNTER — OFFICE VISIT (OUTPATIENT)
Dept: INTERNAL MEDICINE | Facility: CLINIC | Age: 56
End: 2021-11-10

## 2021-11-10 ENCOUNTER — LAB (OUTPATIENT)
Dept: LAB | Facility: HOSPITAL | Age: 56
End: 2021-11-10

## 2021-11-10 VITALS
HEART RATE: 85 BPM | OXYGEN SATURATION: 97 % | TEMPERATURE: 97.1 F | BODY MASS INDEX: 38.22 KG/M2 | DIASTOLIC BLOOD PRESSURE: 84 MMHG | HEIGHT: 71 IN | SYSTOLIC BLOOD PRESSURE: 140 MMHG | WEIGHT: 273 LBS

## 2021-11-10 DIAGNOSIS — E78.5 HYPERLIPIDEMIA, UNSPECIFIED HYPERLIPIDEMIA TYPE: ICD-10-CM

## 2021-11-10 DIAGNOSIS — I10 ESSENTIAL HYPERTENSION: Primary | ICD-10-CM

## 2021-11-10 DIAGNOSIS — F41.9 ANXIETY AND DEPRESSION: ICD-10-CM

## 2021-11-10 DIAGNOSIS — R20.2 NUMBNESS AND TINGLING OF BOTH LEGS: ICD-10-CM

## 2021-11-10 DIAGNOSIS — I10 ESSENTIAL HYPERTENSION: ICD-10-CM

## 2021-11-10 DIAGNOSIS — F32.A ANXIETY AND DEPRESSION: ICD-10-CM

## 2021-11-10 DIAGNOSIS — G62.9 PERIPHERAL POLYNEUROPATHY: ICD-10-CM

## 2021-11-10 DIAGNOSIS — Z79.899 ENCOUNTER FOR LONG-TERM (CURRENT) USE OF MEDICATIONS: ICD-10-CM

## 2021-11-10 DIAGNOSIS — R20.0 NUMBNESS AND TINGLING OF BOTH LEGS: ICD-10-CM

## 2021-11-10 DIAGNOSIS — M53.3 PAIN OF RIGHT SACROILIAC JOINT: ICD-10-CM

## 2021-11-10 DIAGNOSIS — G25.81 RESTLESS LEG SYNDROME: ICD-10-CM

## 2021-11-10 PROCEDURE — 99214 OFFICE O/P EST MOD 30 MIN: CPT | Performed by: FAMILY MEDICINE

## 2021-11-10 PROCEDURE — 80061 LIPID PANEL: CPT | Performed by: FAMILY MEDICINE

## 2021-11-10 PROCEDURE — 80053 COMPREHEN METABOLIC PANEL: CPT | Performed by: FAMILY MEDICINE

## 2021-11-10 RX ORDER — VALSARTAN 320 MG/1
320 TABLET ORAL DAILY
Qty: 30 TABLET | Refills: 3 | Status: SHIPPED | OUTPATIENT
Start: 2021-11-10 | End: 2022-01-01

## 2021-11-10 RX ORDER — AMLODIPINE BESYLATE 10 MG/1
10 TABLET ORAL DAILY
Qty: 30 TABLET | Refills: 5 | Status: SHIPPED | OUTPATIENT
Start: 2021-11-10 | End: 2022-08-03 | Stop reason: SDUPTHER

## 2021-11-10 RX ORDER — TIZANIDINE 4 MG/1
4 TABLET ORAL NIGHTLY PRN
Qty: 30 TABLET | Refills: 3 | Status: SHIPPED | OUTPATIENT
Start: 2021-11-10 | End: 2022-02-09 | Stop reason: SDUPTHER

## 2021-11-10 RX ORDER — BUPROPION HYDROCHLORIDE 150 MG/1
150 TABLET ORAL DAILY
Qty: 30 TABLET | Refills: 3 | Status: SHIPPED | OUTPATIENT
Start: 2021-11-10 | End: 2022-02-09 | Stop reason: SDUPTHER

## 2021-11-10 RX ORDER — LOVASTATIN 40 MG/1
40 TABLET ORAL NIGHTLY
Qty: 30 TABLET | Refills: 3 | Status: SHIPPED | OUTPATIENT
Start: 2021-11-10 | End: 2022-01-01

## 2021-11-10 RX ORDER — HYDROCHLOROTHIAZIDE 25 MG/1
25 TABLET ORAL DAILY
Qty: 30 TABLET | Refills: 3 | Status: SHIPPED | OUTPATIENT
Start: 2021-11-10 | End: 2022-01-01

## 2021-11-10 RX ORDER — GABAPENTIN 600 MG/1
600 TABLET ORAL 2 TIMES DAILY
Qty: 60 TABLET | Refills: 0 | Status: SHIPPED | OUTPATIENT
Start: 2021-11-10 | End: 2021-12-09

## 2021-11-11 LAB
ALBUMIN SERPL-MCNC: 4.4 G/DL (ref 3.5–5.2)
ALBUMIN/GLOB SERPL: 1.3 G/DL
ALP SERPL-CCNC: 108 U/L (ref 39–117)
ALT SERPL W P-5'-P-CCNC: 30 U/L (ref 1–33)
ANION GAP SERPL CALCULATED.3IONS-SCNC: 5.8 MMOL/L (ref 5–15)
AST SERPL-CCNC: 26 U/L (ref 1–32)
BILIRUB SERPL-MCNC: 0.3 MG/DL (ref 0–1.2)
BUN SERPL-MCNC: 19 MG/DL (ref 6–20)
BUN/CREAT SERPL: 21.3 (ref 7–25)
CALCIUM SPEC-SCNC: 9.9 MG/DL (ref 8.6–10.5)
CHLORIDE SERPL-SCNC: 100 MMOL/L (ref 98–107)
CHOLEST SERPL-MCNC: 186 MG/DL (ref 0–200)
CO2 SERPL-SCNC: 31.2 MMOL/L (ref 22–29)
CREAT SERPL-MCNC: 0.89 MG/DL (ref 0.57–1)
GFR SERPL CREATININE-BSD FRML MDRD: 66 ML/MIN/1.73
GLOBULIN UR ELPH-MCNC: 3.3 GM/DL
GLUCOSE SERPL-MCNC: 112 MG/DL (ref 65–99)
HDLC SERPL-MCNC: 32 MG/DL (ref 40–60)
LDLC SERPL CALC-MCNC: 120 MG/DL (ref 0–100)
LDLC/HDLC SERPL: 3.62 {RATIO}
POTASSIUM SERPL-SCNC: 3.6 MMOL/L (ref 3.5–5.2)
PROT SERPL-MCNC: 7.7 G/DL (ref 6–8.5)
SODIUM SERPL-SCNC: 137 MMOL/L (ref 136–145)
TRIGL SERPL-MCNC: 191 MG/DL (ref 0–150)
VLDLC SERPL-MCNC: 34 MG/DL (ref 5–40)

## 2021-11-18 ENCOUNTER — TREATMENT (OUTPATIENT)
Dept: PHYSICAL THERAPY | Facility: CLINIC | Age: 56
End: 2021-11-18

## 2021-11-18 DIAGNOSIS — M79.602 LEFT ARM PAIN: Primary | ICD-10-CM

## 2021-11-18 PROCEDURE — 97162 PT EVAL MOD COMPLEX 30 MIN: CPT | Performed by: PHYSICAL THERAPIST

## 2021-11-18 PROCEDURE — 97110 THERAPEUTIC EXERCISES: CPT | Performed by: PHYSICAL THERAPIST
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
## 2021-11-27 DIAGNOSIS — F32.A ANXIETY AND DEPRESSION: ICD-10-CM

## 2021-11-27 DIAGNOSIS — F41.9 ANXIETY AND DEPRESSION: ICD-10-CM

## 2021-11-29 RX ORDER — BUPROPION HYDROCHLORIDE 150 MG/1
150 TABLET ORAL DAILY
Qty: 30 TABLET | Refills: 3 | OUTPATIENT
Start: 2021-11-29

## 2021-12-08 DIAGNOSIS — G25.81 RESTLESS LEG SYNDROME: ICD-10-CM

## 2021-12-08 DIAGNOSIS — G62.9 PERIPHERAL POLYNEUROPATHY: ICD-10-CM

## 2021-12-09 RX ORDER — GABAPENTIN 600 MG/1
TABLET ORAL
Qty: 60 TABLET | Refills: 0 | Status: SHIPPED | OUTPATIENT
Start: 2021-12-09 | End: 2022-01-07 | Stop reason: SDUPTHER

## 2021-12-09 NOTE — TELEPHONE ENCOUNTER
Rx Refill Note  Requested Prescriptions     Pending Prescriptions Disp Refills   • gabapentin (NEURONTIN) 600 MG tablet [Pharmacy Med Name: GABAPENTIN 600MG TABLETS] 60 tablet      Sig: TAKE 1 TABLET BY MOUTH TWICE DAILY      Last office visit with prescribing clinician: 11/10/2021      Next office visit with prescribing clinician: 2/9/2022     Last Fill Date: 11/10/2021  Quantity: 60  Refills: 0   UDS: Ordered 11/10 - not collected   CSA: 11/11/2021 April MARTHA Garcia MA  12/09/21, 07:40 EST

## 2021-12-14 ENCOUNTER — TELEPHONE (OUTPATIENT)
Dept: ORTHOPEDICS | Facility: OTHER | Age: 56
End: 2021-12-14

## 2021-12-21 ENCOUNTER — TREATMENT (OUTPATIENT)
Dept: PHYSICAL THERAPY | Facility: CLINIC | Age: 56
End: 2021-12-21

## 2021-12-21 DIAGNOSIS — M79.602 LEFT ARM PAIN: Primary | ICD-10-CM

## 2021-12-21 PROCEDURE — 97110 THERAPEUTIC EXERCISES: CPT | Performed by: PHYSICAL THERAPIST

## 2021-12-21 NOTE — PROGRESS NOTES
Physical Therapy Daily Progress Note    Patient: Zoë Dominguez   : 1965  Diagnosis/ICD-10 Code:  The encounter diagnosis was Left arm pain.   Referring practitioner: Rahel Uriostegui MD  Date of Initial Visit: Type: THERAPY  Noted: 2021  Today's Date: 2021  Visit:  2     Zoë Dominguez reports: increasing left shoulder pain, and decreasing left shoulder mobility.    Subjective     Treatment  Pre-treatment pain:  8  Post-treatment pain:  8  (pain can be as bad as 10/10-crying with pain, and as low as 4/10)    Exercise 1  Exercise Name 1: Reassessment  Exercise 2  Exercise Name 2: HEP modification  Exercise 3  Exercise Name 3: low row with band  Equipment/Resistance 3: yellow band  Exercise 4  Exercise Name 4: table slide L UE motion             Objective          Active Range of Motion   Left Shoulder   Flexion: 77 degrees   Abduction: 69 degrees   External rotation BTH: Active external rotation behind the head: to occiput.   Internal rotation BTB: T12         Assessment & Plan     Assessment    Assessment details: Pt presents today for her first follow up since her initial evaluation more than 1 month ago.  She demonstrates increasing pain and decreasing L shoulder ROM, both actively and passively.  She may be developing adhesive capsulitis.    Plan  Plan details: Assess response to HEP revision.  If symptoms continue to be unresponsive to treatment, refer back to MD.               Timed:  Manual Therapy:         mins  86015;  Therapeutic Exercise:    40     mins  20438;     Neuromuscular Oswaldo:        mins  85889;    Therapeutic Activity:          mins  55781;     Gait Training:           mins  08185;     Ultrasound:          mins  04389;    Electrical Stimulation:         mins  82343 ( );    Untimed:  Electrical Stimulation:         mins  88913 ( );  Mechanical Traction:         mins  32220;     Timed Treatment:   40   mins   Total Treatment:     40   mins      Anna ORTEGA  Felipe, PT  Physical Therapist

## 2021-12-28 RX ORDER — VENLAFAXINE HYDROCHLORIDE 150 MG/1
CAPSULE, EXTENDED RELEASE ORAL
Qty: 30 CAPSULE | Refills: 5 | Status: SHIPPED | OUTPATIENT
Start: 2021-12-28 | End: 2022-05-20

## 2021-12-31 DIAGNOSIS — I10 ESSENTIAL HYPERTENSION: ICD-10-CM

## 2021-12-31 DIAGNOSIS — E78.5 HYPERLIPIDEMIA, UNSPECIFIED HYPERLIPIDEMIA TYPE: ICD-10-CM

## 2022-01-01 RX ORDER — VALSARTAN 320 MG/1
320 TABLET ORAL DAILY
Qty: 30 TABLET | Refills: 3 | Status: SHIPPED | OUTPATIENT
Start: 2022-01-01 | End: 2022-08-03 | Stop reason: SDUPTHER

## 2022-01-01 RX ORDER — LOVASTATIN 40 MG/1
TABLET ORAL
Qty: 30 TABLET | Refills: 3 | Status: SHIPPED | OUTPATIENT
Start: 2022-01-01 | End: 2022-08-22 | Stop reason: SDUPTHER

## 2022-01-01 RX ORDER — HYDROCHLOROTHIAZIDE 25 MG/1
25 TABLET ORAL DAILY
Qty: 30 TABLET | Refills: 3 | Status: SHIPPED | OUTPATIENT
Start: 2022-01-01 | End: 2022-02-09 | Stop reason: SDUPTHER

## 2022-01-03 ENCOUNTER — PATIENT MESSAGE (OUTPATIENT)
Dept: INTERNAL MEDICINE | Facility: CLINIC | Age: 57
End: 2022-01-03

## 2022-01-03 DIAGNOSIS — Z79.899 ENCOUNTER FOR LONG-TERM (CURRENT) USE OF MEDICATIONS: Primary | ICD-10-CM

## 2022-01-04 DIAGNOSIS — Z79.899 ENCOUNTER FOR LONG-TERM (CURRENT) USE OF MEDICATIONS: ICD-10-CM

## 2022-01-04 NOTE — TELEPHONE ENCOUNTER
From: Zoë Dominguez  To: Azul Atkinson MD  Sent: 1/3/2022 12:40 PM EST  Subject: UA    Good afternoon. Do I need to come in and do a UA for my gabapentin I can remember that I signed the contract for the UA and I took it the day they forest my blood back on the I think the 10th of November I have no refills for my gabapentin

## 2022-01-07 ENCOUNTER — PATIENT MESSAGE (OUTPATIENT)
Dept: INTERNAL MEDICINE | Facility: CLINIC | Age: 57
End: 2022-01-07

## 2022-01-07 DIAGNOSIS — G25.81 RESTLESS LEG SYNDROME: ICD-10-CM

## 2022-01-07 DIAGNOSIS — G62.9 PERIPHERAL POLYNEUROPATHY: ICD-10-CM

## 2022-01-07 RX ORDER — GABAPENTIN 600 MG/1
600 TABLET ORAL 2 TIMES DAILY
Qty: 60 TABLET | Refills: 0 | Status: SHIPPED | OUTPATIENT
Start: 2022-01-07 | End: 2022-02-09 | Stop reason: SDUPTHER

## 2022-01-07 NOTE — TELEPHONE ENCOUNTER
Loni Garcia MA 1/7/2022 11:24 AM EST      ----- Message -----  From: Zoë Dominugez  Sent: 1/7/2022 11:08 AM EST  To: [facility]  Subject: Refill     Good afternoon Dr Atkinson I'm calling to see about my refills on my gabapentin that came in on Tuesday afternoon for my UA and I haven't heard anything else. I will be out of my gabapentin tomorrow.

## 2022-01-09 LAB — DRUGS UR: NORMAL

## 2022-01-20 ENCOUNTER — TREATMENT (OUTPATIENT)
Dept: PHYSICAL THERAPY | Facility: CLINIC | Age: 57
End: 2022-01-20

## 2022-01-20 DIAGNOSIS — M79.602 LEFT ARM PAIN: Primary | ICD-10-CM

## 2022-01-20 PROCEDURE — 97110 THERAPEUTIC EXERCISES: CPT | Performed by: PHYSICAL THERAPIST

## 2022-01-20 NOTE — PROGRESS NOTES
Physical Therapy Daily Progress Note    Patient: Zoë Dominguez   : 1965  Diagnosis/ICD-10 Code:  The encounter diagnosis was Left arm pain.   Referring practitioner: Rahel Uriostegui MD  Date of Initial Visit: Type: THERAPY  Noted: 2021  Today's Date: 2022  Visit:  3     Zoë Dominguez reports: L shoulder pain and limited motion before onset of pain.    Subjective     Treatment  Pre-treatment pain:  4  Post-treatment pain:  4  Exercise 1  Exercise Name 1: Reassessment  Exercise 2  Exercise Name 2: HEP review  Exercise 3  Exercise Name 3: HEP progression  ( 3/10 at best, and 9/10 at worst)           Objective          Neurological Testing     Reflexes   Left   Biceps (C5/C6): absent (0)  Brachioradialis (C6): trace (1+)  Triceps (C7): absent (0)    Right   Biceps (C5/C6): absent (0)  Brachioradialis (C6): trace (1+)  Triceps (C7): absent (0)    Additional Neurological Details  Pt reports intermittent numbness in L arm into her hand.    Active Range of Motion   Left Shoulder   Flexion: 122 degrees   Abduction: 82 degrees   External rotation BTH: T2   Internal rotation BTB: T12     Additional Active Range of Motion Details  CROM is WFL's without change in UE symptoms.    Strength/Myotome Testing   Cervical Spine     Right   Normal strength    Left Shoulder     Planes of Motion   Flexion: 4-   Abduction: 3+   External rotation at 0°: 4   Internal rotation at 0°: 4-     Left Elbow   Flexion: 4-  Extension: 4        Assessment & Plan     Assessment    Assessment details: Plan Goals: 4 weeks  Pt. demonstrates independence and compliance with initial HEP-partially met.  Pt. reports reduction in pain intensity to no worse than 6/10 on NPRS-not met.  AROM of L shoulder shows improvement over baseline measures-met.  Functional outcome measure (Quick DASH) is improved by 10 points-met.    6 weeks  Pt. demonstrates independence in advanced HEP for ongoing improvement-ongoing.  AROM of L shoulder  is  sufficient for performance of daily activities-partially met, ongoing.  L UE strength is sufficiant to allow participation in desired activities-not met.  Functional outcome measure (Quick DASH) is improved to 30 points or less.-not met.     Pt has made limited progress.  She has had inconsistent attendance related to her work schedule.  She has persistent L UE weakness, paresthesia, and hyporeflexia consistent with cervical radiculopathy.  L shoulder/RC pathology cannot be ruled out as a contributing factor to symptoms and limitations.    Plan  Plan details: Pt will schedule EMG/NCV as ordered by MD.  She will also look into orthopedic consult.  She will continue with HEP and may return if recommended after additional  medical intervention.               Timed:  Manual Therapy:         mins  76463;  Therapeutic Exercise:    40     mins  91835;     Neuromuscular Oswaldo:        mins  01565;    Therapeutic Activity:          mins  64536;     Gait Training:           mins  34559;     Ultrasound:          mins  36847;    Electrical Stimulation:         mins  29126 ( );    Untimed:  Electrical Stimulation:         mins  95541 ( );  Mechanical Traction:         mins  22345;     Timed Treatment: 40     mins   Total Treatment:     40   mins      Anna Wang PT  Physical Therapist

## 2022-01-31 DIAGNOSIS — I10 ESSENTIAL HYPERTENSION: ICD-10-CM

## 2022-01-31 RX ORDER — AMLODIPINE BESYLATE 10 MG/1
10 TABLET ORAL DAILY
Qty: 30 TABLET | Refills: 5 | OUTPATIENT
Start: 2022-01-31

## 2022-02-07 DIAGNOSIS — M53.3 PAIN OF RIGHT SACROILIAC JOINT: ICD-10-CM

## 2022-02-08 NOTE — TELEPHONE ENCOUNTER
Appointment tomorrow 2/9/22   Quality 130: Documentation Of Current Medications In The Medical Record: Current Medications Documented

## 2022-02-09 ENCOUNTER — OFFICE VISIT (OUTPATIENT)
Dept: INTERNAL MEDICINE | Facility: CLINIC | Age: 57
End: 2022-02-09

## 2022-02-09 VITALS
OXYGEN SATURATION: 98 % | TEMPERATURE: 96.9 F | WEIGHT: 266 LBS | SYSTOLIC BLOOD PRESSURE: 136 MMHG | DIASTOLIC BLOOD PRESSURE: 84 MMHG | BODY MASS INDEX: 37.24 KG/M2 | HEIGHT: 71 IN | HEART RATE: 93 BPM

## 2022-02-09 DIAGNOSIS — I10 ESSENTIAL HYPERTENSION: Primary | ICD-10-CM

## 2022-02-09 DIAGNOSIS — G62.9 PERIPHERAL POLYNEUROPATHY: ICD-10-CM

## 2022-02-09 DIAGNOSIS — M25.552 LEFT HIP PAIN: ICD-10-CM

## 2022-02-09 DIAGNOSIS — G25.81 RESTLESS LEG SYNDROME: ICD-10-CM

## 2022-02-09 DIAGNOSIS — R79.89 ABNORMAL THYROID BLOOD TEST: ICD-10-CM

## 2022-02-09 DIAGNOSIS — F32.A ANXIETY AND DEPRESSION: ICD-10-CM

## 2022-02-09 DIAGNOSIS — R73.09 ABNORMAL GLUCOSE: ICD-10-CM

## 2022-02-09 DIAGNOSIS — M53.3 PAIN OF RIGHT SACROILIAC JOINT: ICD-10-CM

## 2022-02-09 DIAGNOSIS — Z11.59 NEED FOR HEPATITIS C SCREENING TEST: ICD-10-CM

## 2022-02-09 DIAGNOSIS — F41.9 ANXIETY AND DEPRESSION: ICD-10-CM

## 2022-02-09 PROCEDURE — 99214 OFFICE O/P EST MOD 30 MIN: CPT | Performed by: FAMILY MEDICINE

## 2022-02-09 RX ORDER — TIZANIDINE 4 MG/1
4 TABLET ORAL NIGHTLY PRN
Qty: 30 TABLET | Refills: 3 | OUTPATIENT
Start: 2022-02-09

## 2022-02-09 RX ORDER — TIZANIDINE 4 MG/1
4 TABLET ORAL NIGHTLY PRN
Qty: 30 TABLET | Refills: 3 | Status: SHIPPED | OUTPATIENT
Start: 2022-02-09 | End: 2022-07-08

## 2022-02-09 RX ORDER — HYDROCHLOROTHIAZIDE 25 MG/1
25 TABLET ORAL DAILY
Qty: 30 TABLET | Refills: 3 | Status: SHIPPED | OUTPATIENT
Start: 2022-02-09 | End: 2022-08-03 | Stop reason: SDUPTHER

## 2022-02-09 RX ORDER — GABAPENTIN 600 MG/1
600 TABLET ORAL 2 TIMES DAILY
Qty: 60 TABLET | Refills: 2 | Status: SHIPPED | OUTPATIENT
Start: 2022-02-09 | End: 2022-05-20 | Stop reason: SDUPTHER

## 2022-02-09 RX ORDER — BUPROPION HYDROCHLORIDE 150 MG/1
150 TABLET ORAL DAILY
Qty: 30 TABLET | Refills: 3 | Status: SHIPPED | OUTPATIENT
Start: 2022-02-09 | End: 2022-05-20 | Stop reason: SDUPTHER

## 2022-02-09 NOTE — PROGRESS NOTES
"Irineo Dominguez is a 56 y.o. female.     Chief Complaint   Patient presents with   • Hypertension   • Hyperlipidemia   • Peripheral Neuropathy     CSC 11/11/2021 UDS 1/4/2022       Visit Vitals  /84   Pulse 93   Temp 96.9 °F (36.1 °C)   Ht 180.3 cm (71\")   Wt 121 kg (266 lb)   SpO2 98%   BMI 37.10 kg/m²         Hypertension  This is a chronic problem. The current episode started more than 1 year ago. The problem is unchanged. The problem is controlled. Pertinent negatives include no anxiety, blurred vision, chest pain, headaches, malaise/fatigue, neck pain, orthopnea, palpitations, peripheral edema, PND, shortness of breath or sweats. There are no associated agents to hypertension. Risk factors for coronary artery disease include dyslipidemia, obesity, family history and post-menopausal state. Current antihypertension treatment includes diuretics and angiotensin blockers. The current treatment provides significant improvement. There are no compliance problems.  There is no history of angina, kidney disease, CAD/MI, CVA, heart failure, left ventricular hypertrophy, PVD or retinopathy. Identifiable causes of hypertension include sleep apnea (using cpap) and a thyroid problem. There is no history of chronic renal disease.   Hyperlipidemia  This is a chronic problem. The current episode started more than 1 year ago. The problem is controlled. Recent lipid tests were reviewed and are normal. Exacerbating diseases include obesity. She has no history of chronic renal disease, diabetes, hypothyroidism, liver disease or nephrotic syndrome. Factors aggravating her hyperlipidemia include thiazides. Pertinent negatives include no chest pain, focal sensory loss, focal weakness, leg pain, myalgias or shortness of breath. Current antihyperlipidemic treatment includes statins. The current treatment provides significant improvement of lipids. There are no compliance problems.  Risk factors for coronary artery " disease include dyslipidemia, family history, hypertension, obesity and post-menopausal.   Peripheral Neuropathy  This is a chronic problem. The current episode started more than 1 year ago. The problem occurs constantly. The problem has been unchanged. Associated symptoms include arthralgias (left hip since July) and numbness (neuropathy). Pertinent negatives include no abdominal pain, anorexia, change in bowel habit, chest pain, chills, congestion, coughing, diaphoresis, fatigue, fever, headaches, joint swelling, myalgias, nausea, neck pain, rash, sore throat, swollen glands, urinary symptoms, vertigo, visual change, vomiting or weakness. Nothing aggravates the symptoms. She has tried nothing for the symptoms. The treatment provided no relief.   Hip Pain   The incident occurred more than 1 week ago. Incident location: at the [facility]. The injury mechanism was a twisting injury and a fall. The pain is present in the left hip. The pain is at a severity of 5/10. The pain is moderate. The pain has been fluctuating since onset. Associated symptoms include an inability to bear weight and numbness (neuropathy). Pertinent negatives include no loss of motion, loss of sensation, muscle weakness or tingling. She reports no foreign bodies present. The symptoms are aggravated by weight bearing and movement. She has tried acetaminophen and NSAIDs for the symptoms. The treatment provided moderate relief.      Pt is looking at apartment tonight as she is staying with a friend and family that is stressing her.   Pt is no longer taking biotin hair and nail vitamin as of 4 days ago. Last thyroid lab was abnormal.    Pt has been having restless legs that is keeping her up.   Pt  Is going to PT for shoulder pain with cervical radiculopathy. PT is helping.     The following portions of the patient's history were reviewed and updated as appropriate: allergies, current medications, past family history, past medical history, past social  history, past surgical history and problem list.    Past Medical History:   Diagnosis Date   • Abnormal Pap smear of cervix     dysplasia, treated with cone bx   • Acute left-sided muscle weakness 2021    [facility]   • Arthritis    • Compression fracture of cervical spine (HCC) 2021    [facility] at [facility]   • Depression    • Hypertension    • Peripheral neuropathy    • Restless leg syndrome    • Restless leg syndrome    • Sleep apnea    • Sleep apnea in adult       Past Surgical History:   Procedure Laterality Date   • CERVICAL CONE BIOPSY     •  SECTION     • COLONOSCOPY  2019    [facility]   • COLPOSCOPY     • COSMETIC SURGERY  1973   • ENDOMETRIAL ABLATION     • ENDOMETRIAL ABLATION  2019    [facility]   • KNEE CARTILAGE SURGERY Right 2018   • TUBAL ABDOMINAL LIGATION        Family History   Problem Relation Age of Onset   • Arthritis Mother    • Diabetes Mother    • Heart attack Mother    • Hypertension Mother    • Obesity Mother    • Migraines Mother    • Migraines Brother    • Stroke Brother    • Aneurysm Brother         brain   • Diabetes Maternal Grandmother    • Stroke Maternal Grandmother    • Breast cancer Neg Hx    • Ovarian cancer Neg Hx       Social History     Socioeconomic History   • Marital status: Single   Tobacco Use   • Smoking status: Never Smoker   • Smokeless tobacco: Never Used   Vaping Use   • Vaping Use: Never used   Substance and Sexual Activity   • Alcohol use: Yes     Comment: seldom   • Drug use: Never   • Sexual activity: Yes     Partners: Male      Allergies   Allergen Reactions   • Abilify [Aripiprazole] Other (See Comments)     tremors       Review of Systems   Constitutional: Negative for chills, diaphoresis, fatigue, fever and malaise/fatigue.   HENT: Negative for congestion and sore throat.    Eyes: Negative for blurred vision.   Respiratory: Positive for apnea (using CPAP). Negative for cough and shortness of breath.     Cardiovascular: Negative for chest pain, palpitations, orthopnea and PND.   Gastrointestinal: Negative for abdominal pain, anorexia, change in bowel habit, nausea and vomiting.   Musculoskeletal: Positive for arthralgias (left hip since July). Negative for joint swelling, myalgias and neck pain.   Skin: Negative for rash.   Neurological: Positive for numbness (neuropathy). Negative for vertigo, tingling, focal weakness, weakness and headaches.       Objective   Physical Exam  Vitals and nursing note reviewed.   Constitutional:       Appearance: She is well-developed.   HENT:      Head: Normocephalic.      Right Ear: External ear normal.      Left Ear: External ear normal.      Nose: Nose normal.   Eyes:      General: Lids are normal.      Conjunctiva/sclera: Conjunctivae normal.      Pupils: Pupils are equal, round, and reactive to light.   Neck:      Thyroid: No thyroid mass or thyromegaly.      Vascular: No carotid bruit.      Trachea: Trachea normal.   Cardiovascular:      Rate and Rhythm: Normal rate and regular rhythm.      Heart sounds: No murmur heard.      Pulmonary:      Effort: Pulmonary effort is normal. No respiratory distress.      Breath sounds: Normal breath sounds. No decreased breath sounds, wheezing, rhonchi or rales.   Chest:      Chest wall: No tenderness.   Abdominal:      General: Bowel sounds are normal.      Palpations: Abdomen is soft.      Tenderness: There is no abdominal tenderness.   Musculoskeletal:      Cervical back: Normal range of motion and neck supple.      Left hip: Tenderness present. No deformity, lacerations, bony tenderness or crepitus. Decreased range of motion. Normal strength.   Skin:     General: Skin is warm and dry.   Neurological:      Mental Status: She is alert and oriented to person, place, and time.   Psychiatric:         Behavior: Behavior normal.         Assessment/Plan   Diagnoses and all orders for this visit:    1. Essential hypertension (Primary)  -      hydroCHLOROthiazide (HYDRODIURIL) 25 MG tablet; Take 1 tablet by mouth Daily.  Dispense: 30 tablet; Refill: 3  -     Comprehensive Metabolic Panel; Future  -     Lipid Panel; Future  -     TSH; Future  -     CBC & Differential; Future    2. Pain of right sacroiliac joint  -     tiZANidine (ZANAFLEX) 4 MG tablet; Take 1 tablet by mouth At Night As Needed for Muscle Spasms.  Dispense: 30 tablet; Refill: 3    3. Peripheral polyneuropathy  -     gabapentin (NEURONTIN) 600 MG tablet; Take 1 tablet by mouth 2 (Two) Times a Day.  Dispense: 60 tablet; Refill: 2    4. Restless leg syndrome  -     gabapentin (NEURONTIN) 600 MG tablet; Take 1 tablet by mouth 2 (Two) Times a Day.  Dispense: 60 tablet; Refill: 2  -     Magnesium; Future  -     Iron Profile; Future    5. Anxiety and depression  -     buPROPion XL (Wellbutrin XL) 150 MG 24 hr tablet; Take 1 tablet by mouth Daily.  Dispense: 30 tablet; Refill: 3    6. Abnormal thyroid blood test  -     TSH; Future  -     T4, Free; Future    7. Abnormal glucose  -     Hemoglobin A1c; Future    8. Left hip pain  -     XR Hip With or Without Pelvis 2 - 3 View Left; Future    9. Need for hepatitis C screening test  -     HCV Antibody Rfx To Qnt PCR; Future        Wait until next week for lab, to  Make sure biotin has cleared  Pt has enough lovastatin for hyperlipidemia for now.  Pt has enough amlodipine and diovan for BP currently.            Current Outpatient Medications:   •  amLODIPine (NORVASC) 10 MG tablet, Take 1 tablet by mouth Daily., Disp: 30 tablet, Rfl: 5  •  ASPIRIN 81 PO, Take  by mouth., Disp: , Rfl:   •  B Complex Vitamins (B COMPLEX VITAMIN PO), Take 1 tablet by mouth Daily., Disp: , Rfl:   •  buPROPion XL (Wellbutrin XL) 150 MG 24 hr tablet, Take 1 tablet by mouth Daily., Disp: 30 tablet, Rfl: 3  •  cetirizine (zyrTEC) 10 MG tablet, Take 10 mg by mouth Daily., Disp: , Rfl:   •  fluticasone (VERAMYST) 27.5 MCG/SPRAY nasal spray, 2 sprays into the nostril(s) as directed  by provider Daily., Disp: , Rfl:   •  gabapentin (NEURONTIN) 600 MG tablet, Take 1 tablet by mouth 2 (Two) Times a Day., Disp: 60 tablet, Rfl: 2  •  hydroCHLOROthiazide (HYDRODIURIL) 25 MG tablet, Take 1 tablet by mouth Daily., Disp: 30 tablet, Rfl: 3  •  lovastatin (MEVACOR) 40 MG tablet, TAKE 1 TABLET BY MOUTH DAILY AT NIGHT, Disp: 30 tablet, Rfl: 3  •  multivitamin with minerals tablet tablet, Take 1 tablet by mouth Daily., Disp: , Rfl:   •  Potassium 99 MG tablet, Take  by mouth., Disp: , Rfl:   •  tiZANidine (ZANAFLEX) 4 MG tablet, Take 1 tablet by mouth At Night As Needed for Muscle Spasms., Disp: 30 tablet, Rfl: 3  •  valsartan (DIOVAN) 320 MG tablet, TAKE 1 TABLET BY MOUTH DAILY, Disp: 30 tablet, Rfl: 3  •  venlafaxine XR (EFFEXOR-XR) 150 MG 24 hr capsule, TAKE 1 CAPSULE BY MOUTH DAILY, Disp: 30 capsule, Rfl: 5    Return in about 3 months (around 5/9/2022), or if symptoms worsen or fail to improve, for Annual, Recheck controls.

## 2022-02-10 DIAGNOSIS — R20.2 NUMBNESS AND TINGLING OF BOTH LEGS: Primary | ICD-10-CM

## 2022-02-10 DIAGNOSIS — G25.81 RLS (RESTLESS LEGS SYNDROME): ICD-10-CM

## 2022-02-10 DIAGNOSIS — G57.93 NEUROPATHY INVOLVING BOTH LOWER EXTREMITIES: ICD-10-CM

## 2022-02-10 DIAGNOSIS — F32.A ANXIETY AND DEPRESSION: ICD-10-CM

## 2022-02-10 DIAGNOSIS — F41.9 ANXIETY AND DEPRESSION: ICD-10-CM

## 2022-02-10 DIAGNOSIS — R20.0 NUMBNESS AND TINGLING OF BOTH LEGS: Primary | ICD-10-CM

## 2022-02-21 ENCOUNTER — DOCUMENTATION (OUTPATIENT)
Dept: PHYSICAL THERAPY | Facility: CLINIC | Age: 57
End: 2022-02-21

## 2022-02-21 NOTE — PROGRESS NOTES
Discharge Summary  Discharge Summary from Physical Therapy Report      Patient: Zoë Dominguez   : 1965  Diagnosis/ICD-10 Code:  There were no encounter diagnoses.   Referring practitioner: No ref. provider found  Date of Initial Visit: No linked episodes  Today's Date: 2022  Date of Last Visit: 2022     Number of Visits: 3    Discharge Status of Patient: Referred back to MD for additional medical intervention.    Goals: Not Met    Discharge Plan: Continue with current home exercise program as instructed      Date of Discharge: 2022        Anna Wang, PT

## 2022-03-23 ENCOUNTER — LAB (OUTPATIENT)
Dept: LAB | Facility: HOSPITAL | Age: 57
End: 2022-03-23

## 2022-03-23 DIAGNOSIS — G25.81 RESTLESS LEG SYNDROME: ICD-10-CM

## 2022-03-23 DIAGNOSIS — R79.89 ABNORMAL THYROID BLOOD TEST: ICD-10-CM

## 2022-03-23 DIAGNOSIS — I10 ESSENTIAL HYPERTENSION: ICD-10-CM

## 2022-03-23 DIAGNOSIS — R73.09 ABNORMAL GLUCOSE: ICD-10-CM

## 2022-03-23 DIAGNOSIS — Z11.59 NEED FOR HEPATITIS C SCREENING TEST: ICD-10-CM

## 2022-03-23 LAB
ALBUMIN SERPL-MCNC: 4.4 G/DL (ref 3.5–5.2)
ALBUMIN/GLOB SERPL: 1.3 G/DL
ALP SERPL-CCNC: 112 U/L (ref 39–117)
ALT SERPL W P-5'-P-CCNC: 17 U/L (ref 1–33)
ANION GAP SERPL CALCULATED.3IONS-SCNC: 16.2 MMOL/L (ref 5–15)
AST SERPL-CCNC: 18 U/L (ref 1–32)
BASOPHILS # BLD AUTO: 0.09 10*3/MM3 (ref 0–0.2)
BASOPHILS NFR BLD AUTO: 1.3 % (ref 0–1.5)
BILIRUB SERPL-MCNC: 0.4 MG/DL (ref 0–1.2)
BUN SERPL-MCNC: 11 MG/DL (ref 6–20)
BUN/CREAT SERPL: 12 (ref 7–25)
CALCIUM SPEC-SCNC: 9.5 MG/DL (ref 8.6–10.5)
CHLORIDE SERPL-SCNC: 96 MMOL/L (ref 98–107)
CHOLEST SERPL-MCNC: 184 MG/DL (ref 0–200)
CO2 SERPL-SCNC: 26.8 MMOL/L (ref 22–29)
CREAT SERPL-MCNC: 0.92 MG/DL (ref 0.57–1)
DEPRECATED RDW RBC AUTO: 36.2 FL (ref 37–54)
EGFRCR SERPLBLD CKD-EPI 2021: 73.7 ML/MIN/1.73
EOSINOPHIL # BLD AUTO: 0.43 10*3/MM3 (ref 0–0.4)
EOSINOPHIL NFR BLD AUTO: 6.1 % (ref 0.3–6.2)
ERYTHROCYTE [DISTWIDTH] IN BLOOD BY AUTOMATED COUNT: 12.6 % (ref 12.3–15.4)
GLOBULIN UR ELPH-MCNC: 3.4 GM/DL
GLUCOSE SERPL-MCNC: 111 MG/DL (ref 65–99)
HBA1C MFR BLD: 5.9 % (ref 4.8–5.6)
HCT VFR BLD AUTO: 41.5 % (ref 34–46.6)
HDLC SERPL-MCNC: 42 MG/DL (ref 40–60)
HGB BLD-MCNC: 13.9 G/DL (ref 12–15.9)
IMM GRANULOCYTES # BLD AUTO: 0.01 10*3/MM3 (ref 0–0.05)
IMM GRANULOCYTES NFR BLD AUTO: 0.1 % (ref 0–0.5)
IRON 24H UR-MRATE: 62 MCG/DL (ref 37–145)
IRON SATN MFR SERPL: 14 % (ref 20–50)
LDLC SERPL CALC-MCNC: 123 MG/DL (ref 0–100)
LDLC/HDLC SERPL: 2.89 {RATIO}
LYMPHOCYTES # BLD AUTO: 1.94 10*3/MM3 (ref 0.7–3.1)
LYMPHOCYTES NFR BLD AUTO: 27.4 % (ref 19.6–45.3)
MAGNESIUM SERPL-MCNC: 2 MG/DL (ref 1.6–2.6)
MCH RBC QN AUTO: 27.2 PG (ref 26.6–33)
MCHC RBC AUTO-ENTMCNC: 33.5 G/DL (ref 31.5–35.7)
MCV RBC AUTO: 81.2 FL (ref 79–97)
MONOCYTES # BLD AUTO: 0.39 10*3/MM3 (ref 0.1–0.9)
MONOCYTES NFR BLD AUTO: 5.5 % (ref 5–12)
NEUTROPHILS NFR BLD AUTO: 4.21 10*3/MM3 (ref 1.7–7)
NEUTROPHILS NFR BLD AUTO: 59.6 % (ref 42.7–76)
NRBC BLD AUTO-RTO: 0 /100 WBC (ref 0–0.2)
PLATELET # BLD AUTO: 286 10*3/MM3 (ref 140–450)
PMV BLD AUTO: 10.5 FL (ref 6–12)
POTASSIUM SERPL-SCNC: 3.6 MMOL/L (ref 3.5–5.2)
PROT SERPL-MCNC: 7.8 G/DL (ref 6–8.5)
RBC # BLD AUTO: 5.11 10*6/MM3 (ref 3.77–5.28)
SODIUM SERPL-SCNC: 139 MMOL/L (ref 136–145)
T4 FREE SERPL-MCNC: 0.81 NG/DL (ref 0.93–1.7)
TIBC SERPL-MCNC: 454 MCG/DL (ref 298–536)
TRANSFERRIN SERPL-MCNC: 305 MG/DL (ref 200–360)
TRIGL SERPL-MCNC: 103 MG/DL (ref 0–150)
TSH SERPL DL<=0.05 MIU/L-ACNC: 1.6 UIU/ML (ref 0.27–4.2)
VLDLC SERPL-MCNC: 19 MG/DL (ref 5–40)
WBC NRBC COR # BLD: 7.07 10*3/MM3 (ref 3.4–10.8)

## 2022-03-23 PROCEDURE — 80061 LIPID PANEL: CPT | Performed by: FAMILY MEDICINE

## 2022-03-23 PROCEDURE — 86803 HEPATITIS C AB TEST: CPT | Performed by: FAMILY MEDICINE

## 2022-03-23 PROCEDURE — 84439 ASSAY OF FREE THYROXINE: CPT | Performed by: FAMILY MEDICINE

## 2022-03-23 PROCEDURE — 84466 ASSAY OF TRANSFERRIN: CPT | Performed by: FAMILY MEDICINE

## 2022-03-23 PROCEDURE — 83540 ASSAY OF IRON: CPT | Performed by: FAMILY MEDICINE

## 2022-03-23 PROCEDURE — 80050 GENERAL HEALTH PANEL: CPT | Performed by: FAMILY MEDICINE

## 2022-03-23 PROCEDURE — 83735 ASSAY OF MAGNESIUM: CPT | Performed by: FAMILY MEDICINE

## 2022-03-23 PROCEDURE — 83036 HEMOGLOBIN GLYCOSYLATED A1C: CPT | Performed by: FAMILY MEDICINE

## 2022-03-24 LAB
HCV AB S/CO SERPL IA: <0.1 S/CO RATIO (ref 0–0.9)
HCV AB SERPL QL IA: NORMAL

## 2022-03-29 ENCOUNTER — TELEPHONE (OUTPATIENT)
Dept: INTERNAL MEDICINE | Facility: CLINIC | Age: 57
End: 2022-03-29

## 2022-03-29 DIAGNOSIS — F41.9 ANXIETY AND DEPRESSION: ICD-10-CM

## 2022-03-29 DIAGNOSIS — F32.A ANXIETY AND DEPRESSION: ICD-10-CM

## 2022-03-29 RX ORDER — BUPROPION HYDROCHLORIDE 150 MG/1
150 TABLET ORAL DAILY
Qty: 30 TABLET | Refills: 3 | OUTPATIENT
Start: 2022-03-29

## 2022-03-29 NOTE — TELEPHONE ENCOUNTER
Attempted to contact the patient at [phone], phone didn't ring or have a VM to [facility].     HUB TO READ: Please provide the patient with the below results and document the answers to her questions.     ----- Message from Azul CURIEL MD sent at 3/24/2022  7:07 PM EDT -----  T4 is low.  Normal TSH.  She taking any biotin hair and nail vitamin which can artificially elevate thyroid-stimulating hormone?  Is she having a lot of fatigue?  In which case we should repeat thyroid studies.    Iron saturation level slightly low.  She can try over-the-counter iron daily for a week such as 325 mg ferrous sulfate to see if restless leg improves.    Hemoglobin A1c 3-month average glucose has increased slightly but she is still in the prediabetic range.  Please follow low animal fat, low sugar, low alcohol diet.  LDL also slightly high same diet.

## 2022-03-30 NOTE — TELEPHONE ENCOUNTER
LM on VM.  Pt has read the [employer] message.  I let her know that if she has any questions she can call the office to discuss.  Letter has also been sent

## 2022-04-18 ENCOUNTER — TRANSCRIBE ORDERS (OUTPATIENT)
Dept: ADMINISTRATIVE | Facility: HOSPITAL | Age: 57
End: 2022-04-18

## 2022-04-18 DIAGNOSIS — Z12.31 VISIT FOR SCREENING MAMMOGRAM: Primary | ICD-10-CM

## 2022-04-28 ENCOUNTER — APPOINTMENT (OUTPATIENT)
Dept: NEUROLOGY | Facility: HOSPITAL | Age: 57
End: 2022-04-28

## 2022-04-28 ENCOUNTER — HOSPITAL ENCOUNTER (OUTPATIENT)
Dept: NEUROLOGY | Facility: HOSPITAL | Age: 57
Discharge: HOME | End: 2022-04-28
Admitting: FAMILY MEDICINE

## 2022-04-28 DIAGNOSIS — G25.81 RLS (RESTLESS LEGS SYNDROME): ICD-10-CM

## 2022-04-28 DIAGNOSIS — R20.2 NUMBNESS AND TINGLING OF BOTH LEGS: ICD-10-CM

## 2022-04-28 DIAGNOSIS — R20.0 NUMBNESS AND TINGLING OF BOTH LEGS: ICD-10-CM

## 2022-04-28 DIAGNOSIS — G57.93 NEUROPATHY INVOLVING BOTH LOWER EXTREMITIES: ICD-10-CM

## 2022-04-28 DIAGNOSIS — G56.02 CARPAL TUNNEL SYNDROME OF LEFT WRIST: Primary | ICD-10-CM

## 2022-04-28 PROCEDURE — 95908 NRV CNDJ TST 3-4 STUDIES: CPT

## 2022-04-28 PROCEDURE — 95886 MUSC TEST DONE W/N TEST COMP: CPT

## 2022-04-29 ENCOUNTER — TELEPHONE (OUTPATIENT)
Dept: INTERNAL MEDICINE | Facility: CLINIC | Age: 57
End: 2022-04-29

## 2022-04-29 NOTE — TELEPHONE ENCOUNTER
----- Message from Azul CURIEL MD sent at 4/28/2022  6:41 PM EDT -----  EMG nerve conduction studies show carpal tunnel syndrome on the left wrist.  Ortho hand surgery referral will be placed.

## 2022-05-06 ENCOUNTER — HOSPITAL ENCOUNTER (EMERGENCY)
Facility: HOSPITAL | Age: 57
Discharge: HOME | End: 2022-05-07
Attending: EMERGENCY MEDICINE | Admitting: EMERGENCY MEDICINE

## 2022-05-06 ENCOUNTER — APPOINTMENT (OUTPATIENT)
Dept: GENERAL RADIOLOGY | Facility: HOSPITAL | Age: 57
End: 2022-05-06

## 2022-05-06 VITALS
TEMPERATURE: 98.4 F | RESPIRATION RATE: 18 BRPM | BODY MASS INDEX: 39.2 KG/M2 | WEIGHT: 280 LBS | OXYGEN SATURATION: 97 % | SYSTOLIC BLOOD PRESSURE: 182 MMHG | DIASTOLIC BLOOD PRESSURE: 102 MMHG | HEIGHT: 71 IN | HEART RATE: 82 BPM

## 2022-05-06 DIAGNOSIS — M17.11 PRIMARY OSTEOARTHRITIS OF RIGHT KNEE: ICD-10-CM

## 2022-05-06 DIAGNOSIS — M25.461 EFFUSION OF RIGHT KNEE: Primary | ICD-10-CM

## 2022-05-06 PROCEDURE — 99283 EMERGENCY DEPT VISIT LOW MDM: CPT

## 2022-05-06 PROCEDURE — 73560 X-RAY EXAM OF KNEE 1 OR 2: CPT

## 2022-05-07 ENCOUNTER — HOSPITAL ENCOUNTER (OUTPATIENT)
Dept: CARDIOLOGY | Facility: HOSPITAL | Age: 57
Discharge: HOME | End: 2022-05-07
Admitting: EMERGENCY MEDICINE

## 2022-05-07 DIAGNOSIS — M25.461 EFFUSION OF RIGHT KNEE: ICD-10-CM

## 2022-05-07 DIAGNOSIS — M17.11 PRIMARY OSTEOARTHRITIS OF RIGHT KNEE: ICD-10-CM

## 2022-05-07 LAB
BH CV LOWER VASCULAR LEFT COMMON FEMORAL AUGMENT: NORMAL
BH CV LOWER VASCULAR LEFT COMMON FEMORAL COMPRESS: NORMAL
BH CV LOWER VASCULAR LEFT COMMON FEMORAL PHASIC: NORMAL
BH CV LOWER VASCULAR LEFT COMMON FEMORAL SPONT: NORMAL
BH CV LOWER VASCULAR RIGHT COMMON FEMORAL AUGMENT: NORMAL
BH CV LOWER VASCULAR RIGHT COMMON FEMORAL COMPRESS: NORMAL
BH CV LOWER VASCULAR RIGHT COMMON FEMORAL PHASIC: NORMAL
BH CV LOWER VASCULAR RIGHT COMMON FEMORAL SPONT: NORMAL
BH CV LOWER VASCULAR RIGHT DISTAL FEMORAL COMPRESS: NORMAL
BH CV LOWER VASCULAR RIGHT GASTRONEMIUS COMPRESS: NORMAL
BH CV LOWER VASCULAR RIGHT GREATER SAPH AK COMPRESS: NORMAL
BH CV LOWER VASCULAR RIGHT GREATER SAPH BK COMPRESS: NORMAL
BH CV LOWER VASCULAR RIGHT LESSER SAPH COMPRESS: NORMAL
BH CV LOWER VASCULAR RIGHT MID FEMORAL AUGMENT: NORMAL
BH CV LOWER VASCULAR RIGHT MID FEMORAL COMPRESS: NORMAL
BH CV LOWER VASCULAR RIGHT MID FEMORAL PHASIC: NORMAL
BH CV LOWER VASCULAR RIGHT MID FEMORAL SPONT: NORMAL
BH CV LOWER VASCULAR RIGHT PERONEAL COMPRESS: NORMAL
BH CV LOWER VASCULAR RIGHT POPLITEAL AUGMENT: NORMAL
BH CV LOWER VASCULAR RIGHT POPLITEAL COMPRESS: NORMAL
BH CV LOWER VASCULAR RIGHT POPLITEAL PHASIC: NORMAL
BH CV LOWER VASCULAR RIGHT POPLITEAL SPONT: NORMAL
BH CV LOWER VASCULAR RIGHT POSTERIOR TIBIAL COMPRESS: NORMAL
BH CV LOWER VASCULAR RIGHT PROFUNDA FEMORAL COMPRESS: NORMAL
BH CV LOWER VASCULAR RIGHT PROXIMAL FEMORAL COMPRESS: NORMAL
BH CV LOWER VASCULAR RIGHT SAPHENOFEMORAL JUNCTION COMPRESS: NORMAL
MAXIMAL PREDICTED HEART RATE: 165 BPM
STRESS TARGET HR: 140 BPM

## 2022-05-07 PROCEDURE — 93971 EXTREMITY STUDY: CPT

## 2022-05-07 PROCEDURE — 93971 EXTREMITY STUDY: CPT | Performed by: INTERNAL MEDICINE

## 2022-05-07 RX ORDER — ACETAMINOPHEN 500 MG
1000 TABLET ORAL ONCE
Status: COMPLETED | OUTPATIENT
Start: 2022-05-07 | End: 2022-05-07

## 2022-05-07 RX ORDER — IBUPROFEN 400 MG/1
400 TABLET ORAL ONCE
Status: DISCONTINUED | OUTPATIENT
Start: 2022-05-07 | End: 2022-05-07

## 2022-05-07 RX ORDER — MELOXICAM 7.5 MG/1
7.5 TABLET ORAL DAILY
Qty: 30 TABLET | Refills: 0 | Status: SHIPPED | OUTPATIENT
Start: 2022-05-07 | End: 2022-06-09 | Stop reason: SDUPTHER

## 2022-05-07 RX ADMIN — ACETAMINOPHEN 1000 MG: 500 TABLET ORAL at 00:24

## 2022-05-07 NOTE — DISCHARGE INSTRUCTIONS
Rest, ice knee, recommendation for follow-up with outpatient ultrasound to evaluate for DVT as discussed.  If pain in the knee continues, also recommending follow-up with your primary care physician and orthopedic clinic.

## 2022-05-07 NOTE — ED PROVIDER NOTES
EMERGENCY DEPARTMENT ENCOUNTER    Pt Name: Zoë Dominguez  MRN: [id number]  Pt :   1965  Room Number:  14  Date of encounter:  2022  PCP: Azul Atkinson MD  ED Provider: Alonso Fink MD    Historian: pt      HPI:  Chief Complaint:knee pain        Context: Zoë Dominguez is a 56 y.o. female who presents to the ED c/o right knee pain, noticed today after dancing.  Complains of pain in the posterior portion of the knee, hurts with palpation, some radiation of the pain upwards.  Has had other orthopedic issues lately namely of the left arm and shoulder and prior meniscal problems.  No acute injury or fall.  No history of DVT or thromboembolism.      PAST MEDICAL HISTORY  Past Medical History:   Diagnosis Date   • Abnormal Pap smear of cervix 1989    dysplasia, treated with cone bx   • Acute left-sided muscle weakness 2021    [facility]   • Arthritis    • Compression fracture of cervical spine (HCC) 2021    CTA at [facility]   • Depression    • Hypertension    • Peripheral neuropathy    • Restless leg syndrome    • Restless leg syndrome    • Sleep apnea    • Sleep apnea in adult          PAST SURGICAL HISTORY  Past Surgical History:   Procedure Laterality Date   • CERVICAL CONE BIOPSY     •  SECTION     • COLONOSCOPY  2019    [facility]   • COLPOSCOPY     • COSMETIC SURGERY  1973   • ENDOMETRIAL ABLATION     • ENDOMETRIAL ABLATION  2019    [facility]   • KNEE CARTILAGE SURGERY Right 2018   • TUBAL ABDOMINAL LIGATION           FAMILY HISTORY  Family History   Problem Relation Age of Onset   • Arthritis Mother    • Diabetes Mother    • Heart attack Mother    • Hypertension Mother    • Obesity Mother    • Migraines Mother    • Migraines Brother    • Stroke Brother    • Aneurysm Brother         brain   • Diabetes Maternal Grandmother    • Stroke Maternal Grandmother    • Breast cancer Neg Hx    • Ovarian cancer Neg Hx           SOCIAL HISTORY  Social History     Socioeconomic History   • Marital status: Single   Tobacco Use   • Smoking status: Never Smoker   • Smokeless tobacco: Never Used   Vaping Use   • Vaping Use: Never used   Substance and Sexual Activity   • Alcohol use: Yes     Comment: seldom   • Drug use: Never   • Sexual activity: Yes     Partners: Male         ALLERGIES  Abilify [aripiprazole]        REVIEW OF SYSTEMS  Review of Systems     General no fevers chills or cough    Cardiac no chest pain    Respiratory no shortness of breath    All systems reviewed and negative except for those discussed in HPI.       PHYSICAL EXAM    I have reviewed the triage vital signs and nursing notes.    ED Triage Vitals [05/06/22 2148]   Temp Heart Rate Resp BP SpO2   98.4 °F (36.9 °C) 82 18 (!) 182/102 97 %      Temp src Heart Rate Source Patient Position BP Location FiO2 (%)   Oral Monitor Sitting Right arm --       Physical Exam  GENERAL:   Appears no distress  HENT: Nares patent.  EYES: No scleral icterus.  CV: Regular rhythm, regular rate.  RESPIRATORY: Normal effort.  No audible wheezes, rales or rhonchi.  ABDOMEN: Soft, nontender  MUSCULOSKELETAL: No deformities.  Pain at the medial and posterior joint line, and with range of motion.  No edema on exam of bilateral lower extremities, good peripheral perfusion.  NEURO: Alert, moves all extremities, follows commands.  SKIN: Warm, dry, no rash visualized.        LAB RESULTS  No results found for this or any previous visit (from the past 24 hour(s)).    If labs were ordered, I independently reviewed the results.        RADIOLOGY  I did discuss an outpatient ultrasound with Ms. Dominguez to evaluate or rule out DVT, she understands and agrees to do this.      PROCEDURES    Procedures    No orders to display       MEDICATIONS GIVEN IN ER    Medications   ibuprofen (ADVIL,MOTRIN) tablet 400 mg (has no administration in time range)   acetaminophen (TYLENOL) tablet 1,000 mg (has no  administration in time range)         PROGRESS, DATA ANALYSIS, CONSULTS, AND MEDICAL DECISION MAKING    All labs have been independently reviewed by me.  All radiology studies have been reviewed by me and the radiologist dictating the report.   EKG's have been independently viewed and interpreted by me.      Differential diagnoses: Tendinitis, knee effusion, Baker's cyst                 AS OF 00:03 EDT VITALS:    BP - (!) 182/102  HR - 82  TEMP - 98.4 °F (36.9 °C) (Oral)  O2 SATS - 97%                  DIAGNOSIS  Final diagnoses:   Effusion of right knee   Primary osteoarthritis of right knee         DISPOSITION  DISCHARGE    Patient discharged in stable condition.    Reviewed implications of results, diagnosis, meds, responsibility to follow up, warning signs and symptoms of possible worsening, potential complications and reasons to return to ER.    Patient/Family voiced understanding of above instructions.    Discussed plan for discharge, as there is no emergent indication for admission.  Pt/family is agreeable and understands need for follow up and possible repeat testing.  Pt/family is aware that discharge does not mean that nothing is wrong but that it indicates no emergency is currently present that requires admission and they must continue care with follow-up as given below or with a physician of their choice.     FOLLOW-UP  Kirill White MD  [address] [address]  [phone]      [facility] orthopedic office, referral for follow-up if persisting knee pain         Medication List      New Prescriptions    meloxicam 7.5 MG tablet  Commonly known as: MOBIC  Take 1 tablet by mouth Daily.           Where to Get Your Medications      These medications were sent to [facility] [id number] - [place], [place] - [address] [facility] - [phone]  - [phone] FX  [address] [address]    Phone: [phone]   · meloxicam 7.5 MG tablet                   Alonso Fink MD  05/07/22 0003

## 2022-05-13 DIAGNOSIS — G62.9 PERIPHERAL POLYNEUROPATHY: ICD-10-CM

## 2022-05-13 DIAGNOSIS — G25.81 RESTLESS LEG SYNDROME: ICD-10-CM

## 2022-05-13 RX ORDER — GABAPENTIN 600 MG/1
TABLET ORAL
Qty: 60 TABLET | OUTPATIENT
Start: 2022-05-13

## 2022-05-20 ENCOUNTER — OFFICE VISIT (OUTPATIENT)
Dept: FAMILY MEDICINE CLINIC | Facility: CLINIC | Age: 57
End: 2022-05-20

## 2022-05-20 VITALS
SYSTOLIC BLOOD PRESSURE: 158 MMHG | DIASTOLIC BLOOD PRESSURE: 76 MMHG | HEART RATE: 90 BPM | OXYGEN SATURATION: 98 % | WEIGHT: 263.4 LBS | BODY MASS INDEX: 36.88 KG/M2 | TEMPERATURE: 97.8 F | HEIGHT: 71 IN

## 2022-05-20 DIAGNOSIS — G62.9 PERIPHERAL POLYNEUROPATHY: ICD-10-CM

## 2022-05-20 DIAGNOSIS — F32.4 MAJOR DEPRESSIVE DISORDER WITH SINGLE EPISODE, IN PARTIAL REMISSION: Primary | ICD-10-CM

## 2022-05-20 DIAGNOSIS — G25.81 RESTLESS LEG SYNDROME: ICD-10-CM

## 2022-05-20 DIAGNOSIS — T78.40XA ALLERGY, INITIAL ENCOUNTER: ICD-10-CM

## 2022-05-20 DIAGNOSIS — F41.9 ANXIETY AND DEPRESSION: ICD-10-CM

## 2022-05-20 DIAGNOSIS — F32.A ANXIETY AND DEPRESSION: ICD-10-CM

## 2022-05-20 PROBLEM — M54.50 LOW BACK PAIN: Status: ACTIVE | Noted: 2021-07-19

## 2022-05-20 PROBLEM — Z86.69 HISTORY OF OBSTRUCTIVE SLEEP APNEA: Status: ACTIVE | Noted: 2022-05-20

## 2022-05-20 PROBLEM — E87.6 HYPOKALEMIA: Status: ACTIVE | Noted: 2021-06-03

## 2022-05-20 PROBLEM — M25.552 LEFT HIP PAIN: Status: ACTIVE | Noted: 2021-07-19

## 2022-05-20 PROBLEM — M54.16 LUMBAR RADICULOPATHY: Status: ACTIVE | Noted: 2021-07-19

## 2022-05-20 PROBLEM — R53.1 LEFT-SIDED WEAKNESS: Status: ACTIVE | Noted: 2021-06-03

## 2022-05-20 PROBLEM — M25.512 LEFT SHOULDER PAIN: Status: ACTIVE | Noted: 2021-07-19

## 2022-05-20 PROCEDURE — 99214 OFFICE O/P EST MOD 30 MIN: CPT | Performed by: FAMILY MEDICINE

## 2022-05-20 RX ORDER — GABAPENTIN 600 MG/1
600 TABLET ORAL 3 TIMES DAILY
Qty: 90 TABLET | Refills: 2 | Status: SHIPPED | OUTPATIENT
Start: 2022-05-20 | End: 2022-08-03 | Stop reason: SDUPTHER

## 2022-05-20 RX ORDER — METHYLPREDNISOLONE 4 MG/1
TABLET ORAL
Qty: 21 EACH | Refills: 0 | Status: SHIPPED | OUTPATIENT
Start: 2022-05-20 | End: 2022-08-22

## 2022-05-20 RX ORDER — VENLAFAXINE HYDROCHLORIDE 75 MG/1
75 CAPSULE, EXTENDED RELEASE ORAL DAILY
Qty: 15 CAPSULE | Refills: 0 | Status: SHIPPED | OUTPATIENT
Start: 2022-05-20 | End: 2022-06-07 | Stop reason: SDUPTHER

## 2022-05-20 RX ORDER — VENLAFAXINE HYDROCHLORIDE 37.5 MG/1
37.5 CAPSULE, EXTENDED RELEASE ORAL DAILY
Qty: 30 CAPSULE | Refills: 0 | Status: SHIPPED | OUTPATIENT
Start: 2022-05-20 | End: 2022-05-26

## 2022-05-20 RX ORDER — BUPROPION HYDROCHLORIDE 150 MG/1
150 TABLET ORAL DAILY
Qty: 90 TABLET | Refills: 3 | Status: SHIPPED | OUTPATIENT
Start: 2022-05-20 | End: 2022-08-03 | Stop reason: SDUPTHER

## 2022-05-20 NOTE — PROGRESS NOTES
New Patient Office Visit      Patient Name: Zoë Dominguez  : 1965   MRN: [id number]     Chief Complaint:    Chief Complaint   Patient presents with   • Allergies   • Cough   • Establish Care       History of Present Illness: Zoë Dominguez is a 56 y.o. female who is here today to establish care.  Patient presents to establish care.  She has anxiety, depression, chronic pain with restless leg syndrome, sleep apnea, hypertension and right knee pain from recent injury.  She did go to the ER for the knee pain.  She has had an x-ray.  Is wearing a brace currently.  Pain has improved slightly but the pain is in the posterior aspect of her leg.        Depression and anxiety-patient wants to stop Effexor.  She is on Wellbutrin and thinks that she does not want both of them.  I agree as she could have too much norepinephrine.  She may need some serotonin no.  Discussed possibly starting Zoloft or Lexapro in the future to complement Wellbutrin dosing.    Chronic pain and rls-relatively controlled on gabapentin but patient says she does have flareups and she could use a little bit higher dose to improve her symptoms.  Previously she was on a 3 times daily dosing.  Previous provider would not increase dosing.  Discussed that this is usually a 3 times daily drug because of the half-life.  She is okay with increasing it back to the 3 times daily dosing.    Hypertension is slightly uncontrolled today but patient slightly anxious.  Patient will continue to monitor at home.    Review of systems positive for restless leg syndrome    Physical exam: Patient's mood and affect are appropriate.  Patient's respite status is nonlabored.      Subjective          Past Medical History:   Past Medical History:   Diagnosis Date   • Abnormal Pap smear of cervix 1989    dysplasia, treated with cone bx   • Acute left-sided muscle weakness 2021    [facility]   • Arthritis    • Compression fracture of  cervical spine (HCC) 2021    CTA at [facility]   • Depression    • Hypertension    • Peripheral neuropathy    • Restless leg syndrome    • Restless leg syndrome    • Sleep apnea    • Sleep apnea in adult        Past Surgical History:   Past Surgical History:   Procedure Laterality Date   • CERVICAL CONE BIOPSY     •  SECTION     • COLONOSCOPY  2019    [facility]   • COLPOSCOPY     • COSMETIC SURGERY  1973   • ENDOMETRIAL ABLATION     • ENDOMETRIAL ABLATION  2019    [facility]   • KNEE CARTILAGE SURGERY Right 2018   • TUBAL ABDOMINAL LIGATION         Family History:   Family History   Problem Relation Age of Onset   • Arthritis Mother    • Diabetes Mother    • Heart attack Mother    • Hypertension Mother    • Obesity Mother    • Migraines Mother    • Migraines Brother    • Stroke Brother    • Aneurysm Brother         brain   • Diabetes Maternal Grandmother    • Stroke Maternal Grandmother    • Breast cancer Neg Hx    • Ovarian cancer Neg Hx        Social History:   Social History     Socioeconomic History   • Marital status: Single   Tobacco Use   • Smoking status: Never Smoker   • Smokeless tobacco: Never Used   Vaping Use   • Vaping Use: Never used   Substance and Sexual Activity   • Alcohol use: Yes     Comment: seldom   • Drug use: Never   • Sexual activity: Yes     Partners: Male       Medications:     Current Outpatient Medications:   •  amLODIPine (NORVASC) 10 MG tablet, Take 1 tablet by mouth Daily., Disp: 30 tablet, Rfl: 5  •  ASPIRIN 81 PO, Take  by mouth., Disp: , Rfl:   •  B Complex Vitamins (B COMPLEX VITAMIN PO), Take 1 tablet by mouth Daily., Disp: , Rfl:   •  buPROPion XL (Wellbutrin XL) 150 MG 24 hr tablet, Take 1 tablet by mouth Daily., Disp: 90 tablet, Rfl: 3  •  cetirizine (zyrTEC) 10 MG tablet, Take 10 mg by mouth Daily., Disp: , Rfl:   •  fluticasone (VERAMYST) 27.5 MCG/SPRAY nasal spray, 2 sprays into the nostril(s) as directed by provider Daily., Disp:  ", Rfl:   •  gabapentin (NEURONTIN) 600 MG tablet, Take 1 tablet by mouth 3 (Three) Times a Day., Disp: 90 tablet, Rfl: 2  •  hydroCHLOROthiazide (HYDRODIURIL) 25 MG tablet, Take 1 tablet by mouth Daily., Disp: 30 tablet, Rfl: 3  •  lovastatin (MEVACOR) 40 MG tablet, TAKE 1 TABLET BY MOUTH DAILY AT NIGHT, Disp: 30 tablet, Rfl: 3  •  meloxicam (MOBIC) 7.5 MG tablet, Take 1 tablet by mouth Daily., Disp: 30 tablet, Rfl: 0  •  multivitamin with minerals tablet tablet, Take 1 tablet by mouth Daily., Disp: , Rfl:   •  Potassium 99 MG tablet, Take  by mouth., Disp: , Rfl:   •  tiZANidine (ZANAFLEX) 4 MG tablet, Take 1 tablet by mouth At Night As Needed for Muscle Spasms., Disp: 30 tablet, Rfl: 3  •  valsartan (DIOVAN) 320 MG tablet, TAKE 1 TABLET BY MOUTH DAILY, Disp: 30 tablet, Rfl: 3  •  methylPREDNISolone (MEDROL) 4 MG dose pack, Take as directed on package instructions., Disp: 21 each, Rfl: 0  •  venlafaxine XR (Effexor XR) 37.5 MG 24 hr capsule, Take 1 capsule by mouth Daily., Disp: 30 capsule, Rfl: 0  •  venlafaxine XR (Effexor XR) 75 MG 24 hr capsule, Take 1 capsule by mouth Daily., Disp: 15 capsule, Rfl: 0    Allergies:   Allergies   Allergen Reactions   • Abilify [Aripiprazole] Other (See Comments)     tremors       Objective     Physical Exam: Please see above  Vital Signs:   Vitals:    05/20/22 1410   BP: 158/76   Pulse: 90   Temp: 97.8 °F (36.6 °C)   TempSrc: Temporal   SpO2: 98%   Weight: 119 kg (263 lb 6.4 oz)   Height: 180.3 cm (71\")   PainSc:   4   PainLoc: Knee  Comment: right knee     Body mass index is 36.74 kg/m².       Assessment / Plan      Assessment/Plan:   Diagnoses and all orders for this visit:    1. Major depressive disorder with single episode, in partial remission (HCC) (Primary)  -     venlafaxine XR (Effexor XR) 75 MG 24 hr capsule; Take 1 capsule by mouth Daily.  Dispense: 15 capsule; Refill: 0  -     venlafaxine XR (Effexor XR) 37.5 MG 24 hr capsule; Take 1 capsule by mouth Daily.  " Dispense: 30 capsule; Refill: 0    2. Peripheral polyneuropathy  -     gabapentin (NEURONTIN) 600 MG tablet; Take 1 tablet by mouth 3 (Three) Times a Day.  Dispense: 90 tablet; Refill: 2    3. Restless leg syndrome  -     gabapentin (NEURONTIN) 600 MG tablet; Take 1 tablet by mouth 3 (Three) Times a Day.  Dispense: 90 tablet; Refill: 2    4. Anxiety and depression  -     buPROPion XL (Wellbutrin XL) 150 MG 24 hr tablet; Take 1 tablet by mouth Daily.  Dispense: 90 tablet; Refill: 3    5. Allergy, initial encounter  -     methylPREDNISolone (MEDROL) 4 MG dose pack; Take as directed on package instructions.  Dispense: 21 each; Refill: 0         1. Discussed drug contract with the patient today.  Patient already has UDS on file.  We will update drug contract.  I transfer gabapentin prescriptions over to myself and will increase to 3 times daily dosing.  2. Stop and wean off of Effexor due to possible interaction with Wellbutrin.  This is likely too much nor epi for the patient.  I recommend adding on an SSRI to Wellbutrin in the future to get synergistic effect between SSRIs and Wellbutrin.  3. We will treat allergies with Medrol Dosepak.  Do not really want to prescribe antibiotic at this point as patient does not have infectious symptoms.  Would recommend Allegra and Flonase together for future allergy prevention.  May go to allergy specialist in the future.          Follow Up:   Return in about 3 months (around 8/20/2022).    Mauricio Frey DO  [facility] [place]

## 2022-05-26 ENCOUNTER — APPOINTMENT (OUTPATIENT)
Dept: OTHER | Facility: HOSPITAL | Age: 57
End: 2022-05-26

## 2022-05-26 ENCOUNTER — HOSPITAL ENCOUNTER (OUTPATIENT)
Dept: MAMMOGRAPHY | Facility: HOSPITAL | Age: 57
Discharge: HOME | End: 2022-05-26
Admitting: FAMILY MEDICINE

## 2022-05-26 ENCOUNTER — OFFICE VISIT (OUTPATIENT)
Dept: ORTHOPEDIC SURGERY | Facility: CLINIC | Age: 57
End: 2022-05-26

## 2022-05-26 VITALS
DIASTOLIC BLOOD PRESSURE: 90 MMHG | SYSTOLIC BLOOD PRESSURE: 160 MMHG | BODY MASS INDEX: 36.82 KG/M2 | HEIGHT: 71 IN | WEIGHT: 263 LBS

## 2022-05-26 DIAGNOSIS — G89.29 CHRONIC LEFT SHOULDER PAIN: Primary | ICD-10-CM

## 2022-05-26 DIAGNOSIS — M25.512 CHRONIC LEFT SHOULDER PAIN: Primary | ICD-10-CM

## 2022-05-26 DIAGNOSIS — M75.102 ROTATOR CUFF SYNDROME OF LEFT SHOULDER: ICD-10-CM

## 2022-05-26 DIAGNOSIS — Z12.31 VISIT FOR SCREENING MAMMOGRAM: ICD-10-CM

## 2022-05-26 DIAGNOSIS — G56.02 LEFT CARPAL TUNNEL SYNDROME: ICD-10-CM

## 2022-05-26 DIAGNOSIS — Z91.81 HISTORY OF FALL: ICD-10-CM

## 2022-05-26 DIAGNOSIS — M19.012 OSTEOARTHRITIS OF GLENOHUMERAL JOINT, LEFT: ICD-10-CM

## 2022-05-26 PROCEDURE — 77067 SCR MAMMO BI INCL CAD: CPT

## 2022-05-26 PROCEDURE — 77063 BREAST TOMOSYNTHESIS BI: CPT

## 2022-05-26 PROCEDURE — 99203 OFFICE O/P NEW LOW 30 MIN: CPT | Performed by: PHYSICIAN ASSISTANT

## 2022-05-26 PROCEDURE — 77063 BREAST TOMOSYNTHESIS BI: CPT | Performed by: RADIOLOGY

## 2022-05-26 PROCEDURE — 77067 SCR MAMMO BI INCL CAD: CPT | Performed by: RADIOLOGY

## 2022-05-26 PROCEDURE — 73030 X-RAY EXAM OF SHOULDER: CPT | Performed by: ORTHOPAEDIC SURGERY

## 2022-06-07 DIAGNOSIS — F32.4 MAJOR DEPRESSIVE DISORDER WITH SINGLE EPISODE, IN PARTIAL REMISSION: ICD-10-CM

## 2022-06-08 RX ORDER — VENLAFAXINE HYDROCHLORIDE 75 MG/1
75 CAPSULE, EXTENDED RELEASE ORAL DAILY
Qty: 15 CAPSULE | Refills: 0 | Status: SHIPPED | OUTPATIENT
Start: 2022-06-08 | End: 2022-06-24 | Stop reason: SDUPTHER

## 2022-06-09 RX ORDER — MELOXICAM 7.5 MG/1
7.5 TABLET ORAL DAILY
Qty: 90 TABLET | Refills: 1 | Status: SHIPPED | OUTPATIENT
Start: 2022-06-09 | End: 2022-11-22

## 2022-06-24 ENCOUNTER — TELEPHONE (OUTPATIENT)
Dept: FAMILY MEDICINE CLINIC | Facility: CLINIC | Age: 57
End: 2022-06-24

## 2022-06-24 DIAGNOSIS — F32.4 MAJOR DEPRESSIVE DISORDER WITH SINGLE EPISODE, IN PARTIAL REMISSION: ICD-10-CM

## 2022-06-24 RX ORDER — VENLAFAXINE HYDROCHLORIDE 75 MG/1
75 CAPSULE, EXTENDED RELEASE ORAL DAILY
Qty: 30 CAPSULE | Refills: 0 | Status: SHIPPED | OUTPATIENT
Start: 2022-06-24 | End: 2022-08-08

## 2022-06-24 NOTE — TELEPHONE ENCOUNTER
Rx Refill Note  Requested Prescriptions      No prescriptions requested or ordered in this encounter      Last office visit with prescribing clinician: 5/20/2022      Next office visit with prescribing clinician: 8/22/2022            Joyce Ramos LPN  06/24/22, 11:52 EDT

## 2022-07-08 DIAGNOSIS — M53.3 PAIN OF RIGHT SACROILIAC JOINT: ICD-10-CM

## 2022-07-08 RX ORDER — TIZANIDINE 4 MG/1
TABLET ORAL
Qty: 30 TABLET | Refills: 0 | Status: SHIPPED | OUTPATIENT
Start: 2022-07-08 | End: 2022-09-06

## 2022-08-03 DIAGNOSIS — I10 ESSENTIAL HYPERTENSION: ICD-10-CM

## 2022-08-03 DIAGNOSIS — F41.9 ANXIETY AND DEPRESSION: ICD-10-CM

## 2022-08-03 DIAGNOSIS — G25.81 RESTLESS LEG SYNDROME: ICD-10-CM

## 2022-08-03 DIAGNOSIS — G62.9 PERIPHERAL POLYNEUROPATHY: ICD-10-CM

## 2022-08-03 DIAGNOSIS — F32.A ANXIETY AND DEPRESSION: ICD-10-CM

## 2022-08-03 RX ORDER — VALSARTAN 320 MG/1
320 TABLET ORAL DAILY
Qty: 30 TABLET | Refills: 3 | Status: SHIPPED | OUTPATIENT
Start: 2022-08-03 | End: 2022-11-22 | Stop reason: SDUPTHER

## 2022-08-03 RX ORDER — BUPROPION HYDROCHLORIDE 150 MG/1
150 TABLET ORAL DAILY
Qty: 90 TABLET | Refills: 3 | Status: SHIPPED | OUTPATIENT
Start: 2022-08-03

## 2022-08-03 RX ORDER — HYDROCHLOROTHIAZIDE 25 MG/1
25 TABLET ORAL DAILY
Qty: 30 TABLET | Refills: 3 | Status: SHIPPED | OUTPATIENT
Start: 2022-08-03 | End: 2022-11-22 | Stop reason: SDUPTHER

## 2022-08-03 RX ORDER — AMLODIPINE BESYLATE 10 MG/1
10 TABLET ORAL DAILY
Qty: 30 TABLET | Refills: 2 | Status: SHIPPED | OUTPATIENT
Start: 2022-08-03 | End: 2022-11-22 | Stop reason: SDUPTHER

## 2022-08-03 RX ORDER — GABAPENTIN 600 MG/1
600 TABLET ORAL 3 TIMES DAILY
Qty: 90 TABLET | Refills: 0 | Status: SHIPPED | OUTPATIENT
Start: 2022-08-03 | End: 2022-08-22 | Stop reason: SDUPTHER

## 2022-08-03 NOTE — TELEPHONE ENCOUNTER
Caller: Angelica Zoë Slade    Relationship: Self    Best call back number: [phone]    Requested Prescriptions:   Requested Prescriptions     Pending Prescriptions Disp Refills   • amLODIPine (NORVASC) 10 MG tablet 30 tablet 5     Sig: Take 1 tablet by mouth Daily.   • valsartan (DIOVAN) 320 MG tablet 30 tablet 3     Sig: Take 1 tablet by mouth Daily.   • hydroCHLOROthiazide (HYDRODIURIL) 25 MG tablet 30 tablet 3     Sig: Take 1 tablet by mouth Daily.   • gabapentin (NEURONTIN) 600 MG tablet 90 tablet 2     Sig: Take 1 tablet by mouth 3 (Three) Times a Day.   • buPROPion XL (Wellbutrin XL) 150 MG 24 hr tablet 90 tablet 3     Sig: Take 1 tablet by mouth Daily.        Pharmacy where request should be sent: [facility] [address] [phone] [facility] [phone] FX     Additional details provided by patient:     Does the patient have less than a 3 day supply:  [x] Yes  [] No    Tripp Perrin Rep   08/03/22 15:14 EDT

## 2022-08-03 NOTE — TELEPHONE ENCOUNTER
Rx Refill Note  Requested Prescriptions     Pending Prescriptions Disp Refills   • amLODIPine (NORVASC) 10 MG tablet 30 tablet 5     Sig: Take 1 tablet by mouth Daily.   • valsartan (DIOVAN) 320 MG tablet 30 tablet 3     Sig: Take 1 tablet by mouth Daily.   • hydroCHLOROthiazide (HYDRODIURIL) 25 MG tablet 30 tablet 3     Sig: Take 1 tablet by mouth Daily.   • gabapentin (NEURONTIN) 600 MG tablet 90 tablet 2     Sig: Take 1 tablet by mouth 3 (Three) Times a Day.   • buPROPion XL (Wellbutrin XL) 150 MG 24 hr tablet 90 tablet 3     Sig: Take 1 tablet by mouth Daily.      Last office visit with prescribing clinician: 5/20/2022      Next office visit with prescribing clinician: 8/22/2022            Abigail Eisenberg  08/03/22, 15:21 EDT

## 2022-08-08 DIAGNOSIS — F32.4 MAJOR DEPRESSIVE DISORDER WITH SINGLE EPISODE, IN PARTIAL REMISSION: Primary | ICD-10-CM

## 2022-08-08 RX ORDER — ESCITALOPRAM OXALATE 5 MG/1
5 TABLET ORAL DAILY
Qty: 30 TABLET | Refills: 1 | Status: SHIPPED | OUTPATIENT
Start: 2022-08-08 | End: 2022-09-22 | Stop reason: SDUPTHER

## 2022-08-09 DIAGNOSIS — E78.5 HYPERLIPIDEMIA, UNSPECIFIED HYPERLIPIDEMIA TYPE: ICD-10-CM

## 2022-08-09 DIAGNOSIS — M53.3 PAIN OF RIGHT SACROILIAC JOINT: ICD-10-CM

## 2022-08-09 RX ORDER — LOVASTATIN 40 MG/1
TABLET ORAL
Qty: 30 TABLET | Refills: 3 | OUTPATIENT
Start: 2022-08-09

## 2022-08-09 RX ORDER — TIZANIDINE 4 MG/1
TABLET ORAL
Qty: 30 TABLET | Refills: 0 | OUTPATIENT
Start: 2022-08-09

## 2022-08-09 NOTE — TELEPHONE ENCOUNTER
Rx Refill Note  Requested Prescriptions     Pending Prescriptions Disp Refills   • lovastatin (MEVACOR) 40 MG tablet [Pharmacy Med Name: LOVASTATIN 40 MG TABLET] 30 tablet 3     Sig: TAKE ONE TABLET BY MOUTH EVERY NIGHT   • tiZANidine (ZANAFLEX) 4 MG tablet [Pharmacy Med Name: tiZANidine HCL 4MG TABLET] 30 tablet 0     Sig: TAKE ONE TABLET BY MOUTH ONCE NIGHTLY AS NEEDED FOR MUSCLE SPASMS **MUST CALL MD FOR APPOINTMENT FOR FURTHER REFILLS      Last filled:  Last office visit with prescribing clinician: 2/9/2022      Next office visit with prescribing clinician: Visit date not found     April MARTHA Garcia MA  08/09/22, 14:22 EDT

## 2022-08-22 ENCOUNTER — OFFICE VISIT (OUTPATIENT)
Dept: FAMILY MEDICINE CLINIC | Facility: CLINIC | Age: 57
End: 2022-08-22

## 2022-08-22 VITALS
WEIGHT: 264 LBS | HEART RATE: 77 BPM | DIASTOLIC BLOOD PRESSURE: 84 MMHG | BODY MASS INDEX: 36.96 KG/M2 | OXYGEN SATURATION: 97 % | HEIGHT: 71 IN | TEMPERATURE: 98.2 F | SYSTOLIC BLOOD PRESSURE: 132 MMHG

## 2022-08-22 DIAGNOSIS — R87.618 PAP SMEAR ABNORMALITY OF CERVIX/HUMAN PAPILLOMAVIRUS (HPV) POSITIVE: ICD-10-CM

## 2022-08-22 DIAGNOSIS — G62.9 PERIPHERAL POLYNEUROPATHY: Primary | ICD-10-CM

## 2022-08-22 DIAGNOSIS — E78.5 HYPERLIPIDEMIA, UNSPECIFIED HYPERLIPIDEMIA TYPE: ICD-10-CM

## 2022-08-22 DIAGNOSIS — G25.81 RESTLESS LEG SYNDROME: ICD-10-CM

## 2022-08-22 DIAGNOSIS — Z23 IMMUNIZATION DUE: ICD-10-CM

## 2022-08-22 DIAGNOSIS — Z51.81 THERAPEUTIC DRUG MONITORING: ICD-10-CM

## 2022-08-22 PROCEDURE — 99214 OFFICE O/P EST MOD 30 MIN: CPT | Performed by: FAMILY MEDICINE

## 2022-08-22 PROCEDURE — 90471 IMMUNIZATION ADMIN: CPT | Performed by: FAMILY MEDICINE

## 2022-08-22 PROCEDURE — 90750 HZV VACC RECOMBINANT IM: CPT | Performed by: FAMILY MEDICINE

## 2022-08-22 RX ORDER — GABAPENTIN 600 MG/1
600 TABLET ORAL 3 TIMES DAILY
Qty: 90 TABLET | Refills: 2 | Status: SHIPPED | OUTPATIENT
Start: 2022-08-22 | End: 2022-11-22 | Stop reason: SDUPTHER

## 2022-08-22 RX ORDER — LOVASTATIN 40 MG/1
40 TABLET ORAL NIGHTLY
Qty: 90 TABLET | Refills: 3 | Status: SHIPPED | OUTPATIENT
Start: 2022-08-22 | End: 2022-12-12 | Stop reason: SDUPTHER

## 2022-08-22 RX ORDER — AMOXICILLIN 500 MG/1
CAPSULE ORAL
COMMUNITY
Start: 2022-08-18 | End: 2022-10-10

## 2022-08-22 NOTE — PROGRESS NOTES
Follow Up Office Visit      Patient Name: Zoë Dominguez  : 1965   MRN: [id number]     Chief Complaint:    Chief Complaint   Patient presents with   • Peripheral Neuropathy       History of Present Illness: Zoë Dominguez is a 57 y.o. female who is here today to follow up with neuropathy and she takes gabapentin and she reporting better controlled neuropathy.  Patient did notice some mild side effects but those have improved.  Gabapentin also helps her with restless leg syndrome.    Patient has a history of having high risk HPV that is not 16 or 18.  This was documented on last cervical cancer screening.  Patient has had these done in her PCP office previously but I do not perform colposcopy or LEEP procedures.  Referred patient to gynecology today for further management of high risk HPV positive Paps.    Patient also has hyperlipidemia and restless leg syndrome.  Hyperlipidemia is treated with statin and she needs refill today.      Review of systems was positive for restless leg syndrome      Physical exam: Patient mood and affect is appropriate    Subjective        I have reviewed and the following portions of the patient's history were updated as appropriate: past family history, past medical history, past social history, past surgical history and problem list.    Medications:     Current Outpatient Medications:   •  amLODIPine (NORVASC) 10 MG tablet, Take 1 tablet by mouth Daily., Disp: 30 tablet, Rfl: 2  •  amoxicillin (AMOXIL) 500 MG capsule, , Disp: , Rfl:   •  ASPIRIN 81 PO, Take  by mouth., Disp: , Rfl:   •  B Complex Vitamins (B COMPLEX VITAMIN PO), Take 1 tablet by mouth Daily., Disp: , Rfl:   •  buPROPion XL (Wellbutrin XL) 150 MG 24 hr tablet, Take 1 tablet by mouth Daily., Disp: 90 tablet, Rfl: 3  •  cetirizine (zyrTEC) 10 MG tablet, Take 10 mg by mouth Daily., Disp: , Rfl:   •  escitalopram (Lexapro) 5 MG tablet, Take 1 tablet by mouth Daily. Increase to 10mg daily if no  "improvement after two weeks., Disp: 30 tablet, Rfl: 1  •  fluticasone (VERAMYST) 27.5 MCG/SPRAY nasal spray, 2 sprays into the nostril(s) as directed by provider Daily., Disp: , Rfl:   •  gabapentin (NEURONTIN) 600 MG tablet, Take 1 tablet by mouth 3 (Three) Times a Day., Disp: 90 tablet, Rfl: 2  •  hydroCHLOROthiazide (HYDRODIURIL) 25 MG tablet, Take 1 tablet by mouth Daily., Disp: 30 tablet, Rfl: 3  •  lovastatin (MEVACOR) 40 MG tablet, Take 1 tablet by mouth Every Night., Disp: 90 tablet, Rfl: 3  •  meloxicam (MOBIC) 7.5 MG tablet, Take 1 tablet by mouth Daily., Disp: 90 tablet, Rfl: 1  •  multivitamin with minerals tablet tablet, Take 1 tablet by mouth Daily., Disp: , Rfl:   •  tiZANidine (ZANAFLEX) 4 MG tablet, TAKE ONE TABLET BY MOUTH ONCE NIGHTLY AS NEEDED FOR MUSCLE SPASMS., Disp: 30 tablet, Rfl: 0  •  valsartan (DIOVAN) 320 MG tablet, Take 1 tablet by mouth Daily., Disp: 30 tablet, Rfl: 3    Allergies:   Allergies   Allergen Reactions   • Abilify [Aripiprazole] Other (See Comments)     tremors       Objective     Physical Exam: Please see above  Vital Signs:   Vitals:    08/22/22 1622   BP: 132/84   Pulse: 77   Temp: 98.2 °F (36.8 °C)   SpO2: 97%   Weight: 120 kg (264 lb)   Height: 180.3 cm (70.98\")     Body mass index is 36.84 kg/m².          Assessment / Plan      Assessment/Plan:   Diagnoses and all orders for this visit:    1. Peripheral polyneuropathy (Primary)  -     gabapentin (NEURONTIN) 600 MG tablet; Take 1 tablet by mouth 3 (Three) Times a Day.  Dispense: 90 tablet; Refill: 2    2. Restless leg syndrome  -     gabapentin (NEURONTIN) 600 MG tablet; Take 1 tablet by mouth 3 (Three) Times a Day.  Dispense: 90 tablet; Refill: 2    3. Immunization due  -     Shingrix Vaccine  -     Shingrix Vaccine; Future    4. Pap smear abnormality of cervix/human papillomavirus (HPV) positive  -     Ambulatory Referral to Gynecology    5. Hyperlipidemia, unspecified hyperlipidemia type  -     lovastatin (MEVACOR) " 40 MG tablet; Take 1 tablet by mouth Every Night.  Dispense: 90 tablet; Refill: 3    6. Therapeutic drug monitoring    Lovastatin controlling hyperlipidemia.  Continue current dose.  Gabapentin working well for patient so continue current dose.    Refer to gynecology for monitoring of high risk HPV Pap smear.  In the future if patient needs further evaluation or procedures done, I will perform a LEEP for colposcopies so referring to Gyn for further management if needed.        Follow Up:   Return in about 3 months (around 11/22/2022) for Recheck.    Mauricio Frey DO  [facility] [place]

## 2022-08-23 ENCOUNTER — OFFICE VISIT (OUTPATIENT)
Dept: ORTHOPEDIC SURGERY | Facility: CLINIC | Age: 57
End: 2022-08-23

## 2022-08-23 VITALS
HEIGHT: 71 IN | WEIGHT: 260.2 LBS | SYSTOLIC BLOOD PRESSURE: 148 MMHG | BODY MASS INDEX: 36.43 KG/M2 | DIASTOLIC BLOOD PRESSURE: 100 MMHG

## 2022-08-23 DIAGNOSIS — M75.42 IMPINGEMENT SYNDROME OF LEFT SHOULDER: ICD-10-CM

## 2022-08-23 DIAGNOSIS — M25.512 CHRONIC LEFT SHOULDER PAIN: Primary | ICD-10-CM

## 2022-08-23 DIAGNOSIS — M75.102 ROTATOR CUFF SYNDROME OF LEFT SHOULDER: ICD-10-CM

## 2022-08-23 DIAGNOSIS — G89.29 CHRONIC LEFT SHOULDER PAIN: Primary | ICD-10-CM

## 2022-08-23 PROCEDURE — 20610 DRAIN/INJ JOINT/BURSA W/O US: CPT | Performed by: PHYSICIAN ASSISTANT

## 2022-08-23 PROCEDURE — 99213 OFFICE O/P EST LOW 20 MIN: CPT | Performed by: PHYSICIAN ASSISTANT

## 2022-08-23 RX ADMIN — LIDOCAINE HYDROCHLORIDE 4 ML: 10 INJECTION, SOLUTION EPIDURAL; INFILTRATION; INTRACAUDAL; PERINEURAL at 15:23

## 2022-08-23 RX ADMIN — TRIAMCINOLONE ACETONIDE 40 MG: 40 INJECTION, SUSPENSION INTRA-ARTICULAR; INTRAMUSCULAR at 15:23

## 2022-08-23 NOTE — PROGRESS NOTES
"    [facility] Note        Subjective     CC: Follow-up (3 month follow up; Chronic left shoulder pain )      JUNAID Dominguez is a 57 y.o. female. Patient returns for follow up of left shoulder. Continued pain since initial appointment 5/2022 but has noted some improvement with PT.    Pain scale: 3/10. Still with popping and burning sensation. The previously noted numbness into the left upper extremity has resolved.     Overall, patient's symptoms are better.    ROS:    Constiutional:Pt denies fever, chills, nausea, or vomiting.  MSK:as above        Objective      Past Medical History  Past Medical History:   Diagnosis Date   • Abnormal Pap smear of cervix 1989    dysplasia, treated with cone bx   • Acute left-sided muscle weakness 06/03/2021    [facility]   • Arthritis    • Compression fracture of cervical spine (HCC) 06/03/2021    CTA at [facility]   • Depression    • Hypertension    • Peripheral neuropathy    • Restless leg syndrome    • Restless leg syndrome    • Sleep apnea    • Sleep apnea in adult          Physical Exam  /100   Ht 180.3 cm (70.98\")   Wt 118 kg (260 lb 3.2 oz)   BMI 36.31 kg/m²     Body mass index is 36.31 kg/m².    Patient is well nourished and well developed.        Ortho Exam  Left shoulder  Skin: Intact without any erythema, warmth or swelling.    Tenderness: Positive tenderness lateral > anterior. Mild discomfort bicipital tendon groove into anterior aspect arm.    Motion: Active , Abd 120, ER (elbows at side) 45, IR L1.   Impingement: Neer positive.  Moulton positive.  Rotator cuff: Gualberto/Empty can. Drop arm negative. Liff-off/modified lift-off mild discomfort. Bear hug mild discomfort.  Biceps: Speed's mild discomfort.  ACJ: Adduction cross body negative.  Deltoid: Intact  Strength: 4/5 rotator cuff.  Motor/sensory: Grossly intact C5-T1.      Imaging/Labs/EMG Reviewed:  No new imaging today.      Assessment:  1. Chronic left " shoulder pain    2. Rotator cuff syndrome of left shoulder    3. Impingement syndrome of left shoulder        Plan:  1. Left shoulder pain due to rotator cuff syndrome, impingement.  2. Continue working with PT.  3. Would like to have steroid injection today.  4. Offered and accepted subacromial corticosteroid injection. Injection given today.  5. Recommend OTC NSAIDs/pain medication as needed.  6. Follow up in 6 weeks for repeat evaluation.  7. Questions and concerns answered.    After discussing the risks, benefits, indications of injection, the patient gave consent to proceed.  Left shoulder, subacromial space was confirmed as the correct site to be injected with a timeout.  It was then prepped using Hibiclens and injected with a mixture of 4 cc of 1% plain lidocaine and 1 cc of Kenalog (40 mg per mL), without any resistance through the posterior lateral approach, patient in seated position.  Area was cleaned, hemostasis was achieved and a Band-Aid was applied over the injection site.  The patient tolerated procedure well.  I instructed the patient on signs and symptoms of infection.  They should report to the emergency department or return to clinic if any of these develop, for further evaluation and treatment.  Recommended modifying activity for the next 48 hours to include rest, ice, elevation and oral pain medication as needed.        Jackie Rodriguez PA-C  08/23/22  21:26 EDT      Dictated Utilizing Dragon Dictation.

## 2022-08-29 RX ORDER — TRIAMCINOLONE ACETONIDE 40 MG/ML
40 INJECTION, SUSPENSION INTRA-ARTICULAR; INTRAMUSCULAR
Status: COMPLETED | OUTPATIENT
Start: 2022-08-23 | End: 2022-08-23

## 2022-08-29 RX ORDER — LIDOCAINE HYDROCHLORIDE 10 MG/ML
4 INJECTION, SOLUTION EPIDURAL; INFILTRATION; INTRACAUDAL; PERINEURAL
Status: COMPLETED | OUTPATIENT
Start: 2022-08-23 | End: 2022-08-23

## 2022-09-05 DIAGNOSIS — M53.3 PAIN OF RIGHT SACROILIAC JOINT: ICD-10-CM

## 2022-09-06 RX ORDER — TIZANIDINE 4 MG/1
TABLET ORAL
Qty: 14 TABLET | Refills: 0 | Status: SHIPPED | OUTPATIENT
Start: 2022-09-06

## 2022-09-22 DIAGNOSIS — F32.4 MAJOR DEPRESSIVE DISORDER WITH SINGLE EPISODE, IN PARTIAL REMISSION: ICD-10-CM

## 2022-09-22 RX ORDER — ESCITALOPRAM OXALATE 5 MG/1
10 TABLET ORAL DAILY
Qty: 60 TABLET | Refills: 1 | Status: SHIPPED | OUTPATIENT
Start: 2022-09-22 | End: 2022-11-01

## 2022-10-10 ENCOUNTER — OFFICE VISIT (OUTPATIENT)
Dept: ORTHOPEDIC SURGERY | Facility: CLINIC | Age: 57
End: 2022-10-10

## 2022-10-10 VITALS
SYSTOLIC BLOOD PRESSURE: 148 MMHG | HEIGHT: 71 IN | DIASTOLIC BLOOD PRESSURE: 82 MMHG | WEIGHT: 259 LBS | BODY MASS INDEX: 36.26 KG/M2

## 2022-10-10 DIAGNOSIS — M75.102 ROTATOR CUFF SYNDROME OF LEFT SHOULDER: ICD-10-CM

## 2022-10-10 DIAGNOSIS — G89.29 CHRONIC LEFT SHOULDER PAIN: Primary | ICD-10-CM

## 2022-10-10 DIAGNOSIS — M75.42 IMPINGEMENT SYNDROME OF LEFT SHOULDER: ICD-10-CM

## 2022-10-10 DIAGNOSIS — M25.512 CHRONIC LEFT SHOULDER PAIN: Primary | ICD-10-CM

## 2022-10-10 PROCEDURE — 99213 OFFICE O/P EST LOW 20 MIN: CPT | Performed by: PHYSICIAN ASSISTANT

## 2022-10-10 NOTE — PROGRESS NOTES
"    [facility] [place]        Subjective     CC: Follow-up (7 week follow up -- Chronic left shoulder pain )      HPI    Zoë Dominguez is a 57 y.o. female.  Patient returns today for follow-up of her left shoulder.  She has been undergoing formal PT at [facility] outpatient.  She also received a corticosteroid injection at her last appointment 8/23/2022.  She notes improvement of pain and increase in motion/mobility.  She still has some occasional popping to the shoulder.  Lying on the affected side does tend to increase her pain.    Pain scale max: 2/10.  No reported numbness or tingling into the upper extremity.    Overall, patient's symptoms are improved.    ROS:    Constiutional:Pt denies fever, chills, nausea, or vomiting.  MSK:as above        Objective      Past Medical History  Past Medical History:   Diagnosis Date   • Abnormal Pap smear of cervix 1989    dysplasia, treated with cone bx   • Acute left-sided muscle weakness 06/03/2021    [facility]   • Arthritis    • Compression fracture of cervical spine (HCC) 06/03/2021    CTA at [facility]   • Depression    • Hypertension    • Peripheral neuropathy    • Restless leg syndrome    • Restless leg syndrome    • Sleep apnea    • Sleep apnea in adult          Physical Exam  /82   Ht 180.3 cm (70.98\")   Wt 117 kg (259 lb)   BMI 36.14 kg/m²     Body mass index is 36.14 kg/m².    Patient is well nourished and well developed.        Ortho Exam  Left shoulder  Skin: Intact without any erythema, warmth or swelling.    Tenderness: Still has some lateral shoulder pain as well as some discomfort noted anterior shoulder.  No specific tenderness noted over bicipital groove on today's exam.  Motion: Active FF 170, Abd 160, ER (elbows at side) 75, IR L5.   Impingement: Neer negative.  Moulton positive.  Rotator cuff: Gualberto/Empty can slight discomfort. Drop arm negative. Liff-off/modified lift-off negative. Bear " hug negative.  Biceps: Speed's negative.  Deltoid: Intact  Strength: 4+/5 rotator cuff.  Motor/sensory: Grossly intact C5-T1.      Imaging/Labs/EMG Reviewed:  No new imaging today.      Assessment:  1. Chronic left shoulder pain    2. Rotator cuff syndrome of left shoulder    3. Impingement syndrome of left shoulder        Plan:  1. Left shoulder pain due to rotator cuff syndrome, impingement--following corticosteroid injection and continuation of PT.  2. Continue working with PT.  3. Recommend OTC NSAIDs/pain medication as needed.  4. Follow up as needed.  5. Questions and concerns answered.      Jackie Rodriguez PA-C  10/10/22  14:31 EDT      Dictated Utilizing Dragon Dictation.

## 2022-10-17 ENCOUNTER — OFFICE VISIT (OUTPATIENT)
Dept: OBSTETRICS AND GYNECOLOGY | Facility: CLINIC | Age: 57
End: 2022-10-17

## 2022-10-17 ENCOUNTER — LAB (OUTPATIENT)
Dept: LAB | Facility: HOSPITAL | Age: 57
End: 2022-10-17

## 2022-10-17 VITALS
SYSTOLIC BLOOD PRESSURE: 136 MMHG | DIASTOLIC BLOOD PRESSURE: 82 MMHG | BODY MASS INDEX: 37.12 KG/M2 | RESPIRATION RATE: 14 BRPM | WEIGHT: 266 LBS

## 2022-10-17 DIAGNOSIS — Z11.3 ROUTINE SCREENING FOR STI (SEXUALLY TRANSMITTED INFECTION): ICD-10-CM

## 2022-10-17 DIAGNOSIS — Z01.419 WELL WOMAN EXAM WITH ROUTINE GYNECOLOGICAL EXAM: Primary | ICD-10-CM

## 2022-10-17 DIAGNOSIS — N39.46 MIXED STRESS AND URGE URINARY INCONTINENCE: ICD-10-CM

## 2022-10-17 PROCEDURE — 99386 PREV VISIT NEW AGE 40-64: CPT | Performed by: NURSE PRACTITIONER

## 2022-10-17 PROCEDURE — 87340 HEPATITIS B SURFACE AG IA: CPT

## 2022-10-17 PROCEDURE — G0432 EIA HIV-1/HIV-2 SCREEN: HCPCS

## 2022-10-17 PROCEDURE — 86592 SYPHILIS TEST NON-TREP QUAL: CPT

## 2022-10-17 PROCEDURE — 86803 HEPATITIS C AB TEST: CPT

## 2022-10-17 NOTE — PROGRESS NOTES
Subjective   Zoë Dominguez is a 57 y.o. year old  presenting to be seen for her annual exam. She had her mammogram in May of this year, birads 1. She had a uterine ablation in 2019 and has not had any bleeding since. She has occasional hot flashes, not interfering with every day life. She has history of abnormal pap in past with cone biopsy, . She has previously had paps with her PCP, however she switched PCPs and her current pcp wanted her to have exams with gyn.     SEXUAL Hx:  She is currently sexually active.  In the past year there there has been ONE new sexual partner.    Condoms are used intermittently.  She would like to be screened for STD's at today's exam.  Current birth control method: condoms.  She is happy with her current method of contraception and does not want to discuss alternative methods of contraception.  MENSTRUAL Hx:  No LMP recorded. (Menstrual status: Tubal ligation).  In the past 6 months her cycles have been absent.  Her menstrual flow has been absent.   Each month on average there are roughly 0 day(s) of very heavy flow.    Intermenstrual bleeding is absent.    Post-coital bleeding is absent.  Dysmenorrhea: none and is not affecting her activities of daily living  PMS: mild and is not affecting her activities of daily living  Her cycles are not a source of concern for her that she wishes to discuss today.  HEALTH Hx:  She exercises regularly: yes.  She wears her seat belt: yes.  She has concerns about domestic violence: no.  OTHER THINGS SHE WANTS TO DISCUSS TODAY:  Nothing else    The following portions of the patient's history were reviewed and updated as appropriate:problem list, current medications, allergies, past family history, past medical history, past social history and past surgical history.    Social History    Tobacco Use      Smoking status: Never      Smokeless tobacco: Never    Review of Systems  Constitutional POS: nothing reported    NEG: anorexia or  night sweats   Genitourinary POS: ROBERT is present but it IS NOT effecting her ADL's and has to wear a pad due to leakage.  She has tried kegels, she has never tried pelvic floor physical therapy    NEG: dysuria or hematuria      Gastointestinal POS: nothing reported    NEG: bloating, change in bowel habits, melena or reflux symptoms   Integument POS: nothing reported    NEG: moles that are changing in size, shape, color or rashes   Breast POS: nothing reported    NEG: persistent breast lump, skin dimpling or nipple discharge        Objective   /82   Resp 14   Wt 121 kg (266 lb)   BMI 37.12 kg/m²     General:  well developed; well nourished  no acute distress  obese - Body mass index is 37.12 kg/m².   Skin:  No suspicious lesions seen   Thyroid: normal to inspection and palpation   Breasts:  Examined in supine position  Symmetric without masses or skin dimpling  Nipples normal without inversion, lesions or discharge  There are no palpable axillary nodes   Abdomen: soft, non-tender; no masses  no umbilical or inguinal hernias are present  no hepato-splenomegaly   Pelvis: Clinical staff was present for exam  :  urethral meatus normal;  Vaginal:  normal pink mucosa without prolapse or lesions.  Cervix:  normal appearance.  Uterus:  normal size, shape and consistency.  Adnexa:  normal bimanual exam of the adnexa.  Rectal:  digital rectal exam not performed; anus visually normal appearing.        Assessment   1. Well woman gynecological exam  2. sti screening today  3. Urinary leakage      Plan     1. Pap and HPV were done today.  If she does not receive the results of the Pap within 2 weeks  time, she was instructed to call to find out the results.  I explained to Zoë that the recommendations for Pap smear interval in a low risk patient's has lengthened to 5 years time if both cytology and HPV testing were normal.  I encouraged her to be seen yearly for a full physical exam including breast and pelvic exam  even during the off years when PAP's will not be performed.  2. She was encouraged to get yearly mammograms.  She should report any palpable breast lump(s) or skin changes regardless of mammographic findings.  I explained to Zoë that notification regarding her mammogram results will come from the center performing the study.  Our office will not be routinely calling with mammogram results.  It is her responsibility to make sure that the results from the mammogram are communicated to her by the breast center.  If she has any questions about the results, she is welcome to call our office anytime.  3. sti screening labs today  4. Will refer to pelvic floor physical therapy for urinary leakage  5. No prescription was given or electronically sent at today's visit  6. The importance of keeping all planned follow-up and taking all medications as prescribed was emphasized.  7. Today I discussed with Zoë the total recommended calcium intake for a premenopausal female is 1000 mg.  Ideally this should be from dietary sources.  I reviewed calcium content in various foods including milk, fortified orange juice and yogurt.  If she cannot get sufficient calcium through dietary means, it is recommended to supplement with either a multivitamin or calcium to reach her daily goal.  I also reviewed the difference in the bioavailability of calcium carbonate and calcium citrate containing supplements and the importance of taking calcium carbonate containing products with food.  Finally, vitamin D's role in calcium absorption was reviewed and a total daily vitamin D intake of 800 units was recommended.  8. She is up to date on her vaccines, due for shingles number 2 next month and covid booster #4 next month as she had covid in July.   9. Follow up for annual exam 1 year    No orders of the defined types were placed in this encounter.         This note was electronically signed.    Meghann Adler, LANDON  October 17, 2022

## 2022-10-18 ENCOUNTER — PATIENT ROUNDING (BHMG ONLY) (OUTPATIENT)
Dept: OBSTETRICS AND GYNECOLOGY | Facility: CLINIC | Age: 57
End: 2022-10-18

## 2022-10-18 LAB
HBV SURFACE AG SERPL QL IA: NORMAL
HCV AB SER DONR QL: NORMAL
HIV1+2 AB SER QL: NORMAL
RPR SER QL: NORMAL

## 2022-10-18 NOTE — PROGRESS NOTES
A [employer] message has been sent to the patient for [place] [facility]

## 2022-10-27 LAB — REF LAB TEST METHOD: NORMAL

## 2022-10-31 LAB — REF LAB TEST METHOD: NORMAL

## 2022-11-01 DIAGNOSIS — F32.4 MAJOR DEPRESSIVE DISORDER WITH SINGLE EPISODE, IN PARTIAL REMISSION: Primary | ICD-10-CM

## 2022-11-01 RX ORDER — ESCITALOPRAM OXALATE 10 MG/1
10 TABLET ORAL DAILY
Qty: 90 TABLET | Refills: 1 | Status: SHIPPED | OUTPATIENT
Start: 2022-11-01 | End: 2023-02-23 | Stop reason: SDUPTHER

## 2022-11-09 ENCOUNTER — TELEPHONE (OUTPATIENT)
Dept: FAMILY MEDICINE CLINIC | Facility: CLINIC | Age: 57
End: 2022-11-09

## 2022-11-09 NOTE — TELEPHONE ENCOUNTER
HUB TO   READ:      Call the patient and help her schedule appointment, my note to the patient as below    Your symptoms are concerning for gastritis or an ulcer.  If you are having severe abdominal pain then you may need to go to the hospital.    If you are not puking up blood or seeing blood in your stool anymore, then it would be okay to follow-up with our office for further evaluation.  In the meantime I would recommend taking over-the-counter Prilosec 20 mg twice a day.  You can take this in the morning and nighttime.  I would also avoid any NSAIDs like Aleve, Mobic/meloxicam, ibuprofen, Motrin.    Please reach out to our office and schedule appointment and let them know that I want to see you within 10 days.  The sooner the better.  If you have any issues scheduling appointment please let me know    I called and left message for patient to return call or to check her [employer] messages.    -

## 2022-11-16 ENCOUNTER — OFFICE VISIT (OUTPATIENT)
Dept: OBSTETRICS AND GYNECOLOGY | Facility: CLINIC | Age: 57
End: 2022-11-16

## 2022-11-16 VITALS
DIASTOLIC BLOOD PRESSURE: 90 MMHG | RESPIRATION RATE: 14 BRPM | BODY MASS INDEX: 37.81 KG/M2 | SYSTOLIC BLOOD PRESSURE: 126 MMHG | WEIGHT: 264.1 LBS | HEIGHT: 70 IN

## 2022-11-16 DIAGNOSIS — R87.810 PAPANICOLAOU SMEAR OF CERVIX WITH POSITIVE HIGH RISK HUMAN PAPILLOMA VIRUS (HPV) TEST: Primary | ICD-10-CM

## 2022-11-16 PROCEDURE — 57454 BX/CURETT OF CERVIX W/SCOPE: CPT | Performed by: STUDENT IN AN ORGANIZED HEALTH CARE EDUCATION/TRAINING PROGRAM

## 2022-11-16 NOTE — PROGRESS NOTES
Colposcopy of cervix    Date of procedure:  11/16/2022   Risks and benefits discussed? yes   All questions answered? yes   Consents given by: patient   Written consent obtained? yes   Pre-op indication: NILM, +HPV 18/45          Procedure documentation:  The cervix was initially viewed colposcopically through a green filter.  The cervix was next bathed in acetic acid.   The findings were as follows:    Transformation zone seen? Yes   Findings: 1. Acetowhite noted at 3 o'clock and 12 o'clock  2. Punctation noted at 12 o'clock   Ectocervical biopsies: taken from 3 o'clock and 12 o'clock.  Silver nitriate was applied to the biopsy sites.   Endocervical curettage: performed               Colposcopic Impression: 1. Mild to moderate dysplasia  2. Adequate colposcopy  3. Colposcopic findings are consistent with cytology       Plan: Will base further treatment on pathology results  Post biopsy instructions given to patient.  Specimens labelled and sent to pathology.         This note was electronically signed.    Lori Gates MD   November 16, 2022

## 2022-11-17 LAB — REF LAB TEST METHOD: NORMAL

## 2022-11-22 ENCOUNTER — OFFICE VISIT (OUTPATIENT)
Dept: FAMILY MEDICINE CLINIC | Facility: CLINIC | Age: 57
End: 2022-11-22

## 2022-11-22 VITALS
RESPIRATION RATE: 21 BRPM | WEIGHT: 264.6 LBS | OXYGEN SATURATION: 98 % | HEART RATE: 75 BPM | DIASTOLIC BLOOD PRESSURE: 86 MMHG | BODY MASS INDEX: 37.88 KG/M2 | SYSTOLIC BLOOD PRESSURE: 168 MMHG | TEMPERATURE: 98.6 F | HEIGHT: 70 IN

## 2022-11-22 DIAGNOSIS — Z51.81 THERAPEUTIC DRUG MONITORING: ICD-10-CM

## 2022-11-22 DIAGNOSIS — G25.81 RESTLESS LEG SYNDROME: ICD-10-CM

## 2022-11-22 DIAGNOSIS — Z23 IMMUNIZATION DUE: ICD-10-CM

## 2022-11-22 DIAGNOSIS — I10 ESSENTIAL HYPERTENSION: ICD-10-CM

## 2022-11-22 DIAGNOSIS — G62.9 PERIPHERAL POLYNEUROPATHY: ICD-10-CM

## 2022-11-22 DIAGNOSIS — Z00.00 ANNUAL PHYSICAL EXAM: Primary | ICD-10-CM

## 2022-11-22 PROCEDURE — 99396 PREV VISIT EST AGE 40-64: CPT | Performed by: FAMILY MEDICINE

## 2022-11-22 PROCEDURE — 91312 COVID-19 (PFIZER) BIVALENT BOOSTER 12+YRS: CPT | Performed by: FAMILY MEDICINE

## 2022-11-22 PROCEDURE — 90471 IMMUNIZATION ADMIN: CPT | Performed by: FAMILY MEDICINE

## 2022-11-22 PROCEDURE — 0124A COVID-19 (PFIZER) BIVALENT BOOSTER 12+YRS: CPT | Performed by: FAMILY MEDICINE

## 2022-11-22 PROCEDURE — 90750 HZV VACC RECOMBINANT IM: CPT | Performed by: FAMILY MEDICINE

## 2022-11-22 RX ORDER — AMLODIPINE BESYLATE 10 MG/1
10 TABLET ORAL DAILY
Qty: 90 TABLET | Refills: 3 | Status: SHIPPED | OUTPATIENT
Start: 2022-11-22

## 2022-11-22 RX ORDER — VALSARTAN 320 MG/1
320 TABLET ORAL DAILY
Qty: 90 TABLET | Refills: 3 | Status: SHIPPED | OUTPATIENT
Start: 2022-11-22

## 2022-11-22 RX ORDER — GABAPENTIN 600 MG/1
600 TABLET ORAL 3 TIMES DAILY
Qty: 90 TABLET | Refills: 2 | Status: SHIPPED | OUTPATIENT
Start: 2022-11-22 | End: 2022-11-22

## 2022-11-22 RX ORDER — GABAPENTIN 600 MG/1
600 TABLET ORAL 3 TIMES DAILY
Qty: 270 TABLET | Refills: 0 | Status: SHIPPED | OUTPATIENT
Start: 2022-11-22 | End: 2023-02-23 | Stop reason: SDUPTHER

## 2022-11-22 RX ORDER — MELOXICAM 7.5 MG/1
7.5 TABLET ORAL DAILY
Qty: 90 TABLET | Refills: 1 | Status: SHIPPED | OUTPATIENT
Start: 2022-11-22 | End: 2022-12-08 | Stop reason: SDUPTHER

## 2022-11-22 RX ORDER — HYDROCHLOROTHIAZIDE 25 MG/1
25 TABLET ORAL DAILY
Qty: 90 TABLET | Refills: 3 | Status: SHIPPED | OUTPATIENT
Start: 2022-11-22

## 2022-11-22 NOTE — PROGRESS NOTES
Follow Up Office Visit      Patient Name: Zoë Dominguez  : 1965   MRN: [id number]     Chief Complaint:    Chief Complaint   Patient presents with   • Rectal Bleeding     Around 2 weeks ago with stomach bloating    • Peripheral polyneuropathy     3M FUP        History of Present Illness: Zoë Dominguez is a 57 y.o. female who is here today to follow up with peripheral neuropathy and rectal bleeding that occurred previously.  Her peripheral neuropathy is well controlled.  Patient is doing overall well.  Blood pressures well controlled at home.  She is having issues with her right knee and ankle.  She twisted her right ankle and continues to swell and have slight discomfort on the dorsal aspect.  She does know a physical therapist.  Patient also is having knee issues.  Patient says the pain is anteriorly but she can also have posterior pain in the knee.  She thought this may have been like a ganglion cyst in the past.  Patient continues to have pain and can follow-up with a physical therapy friend.    Patient is here for annual exam.  We discussed diet, exercise, dental care, vaccines.  Patient agreed boosters as ordered.  She is up-to-date with monitoring labs.  Update colonoscopy.      Review of systems was positive for knee pain and ankle pain      Physical exam: Somatic function noted in lumbar and thoracic region.  Patient had slight J sign noted with passive patellar tracking.  Patient had somatic function noted in right ankle.  Patient's lung and heart exam was normal without rales rhonchi's or murmurs.  Heart exam was RRR.  Lung exam CTA bilaterally.  Patient's lower extremities did not have any edema except in the right ankle that was mild.  Patient denies any joint swelling in the knees.  Patient's mood and affect is appropriate.  Patient's HEENT exam was atraumatic.  Patient's neurological grossly intact.  PERRLA.      Subjective        I have reviewed and the following portions  of the patient's history were updated as appropriate: past family history, past medical history, past social history, past surgical history and problem list.    Medications:     Current Outpatient Medications:   •  amLODIPine (NORVASC) 10 MG tablet, Take 1 tablet by mouth Daily., Disp: 90 tablet, Rfl: 3  •  ASPIRIN 81 PO, Take  by mouth., Disp: , Rfl:   •  B Complex Vitamins (B COMPLEX VITAMIN PO), Take 1 tablet by mouth Daily., Disp: , Rfl:   •  buPROPion XL (Wellbutrin XL) 150 MG 24 hr tablet, Take 1 tablet by mouth Daily., Disp: 90 tablet, Rfl: 3  •  escitalopram (Lexapro) 10 MG tablet, Take 1 tablet by mouth Daily., Disp: 90 tablet, Rfl: 1  •  Fexofenadine HCl (ALLEGRA PO), Take  by mouth., Disp: , Rfl:   •  fluticasone (VERAMYST) 27.5 MCG/SPRAY nasal spray, 2 sprays into the nostril(s) as directed by provider Daily., Disp: , Rfl:   •  gabapentin (NEURONTIN) 600 MG tablet, Take 1 tablet by mouth 3 (Three) Times a Day., Disp: 270 tablet, Rfl: 0  •  hydroCHLOROthiazide (HYDRODIURIL) 25 MG tablet, Take 1 tablet by mouth Daily., Disp: 90 tablet, Rfl: 3  •  lovastatin (MEVACOR) 40 MG tablet, Take 1 tablet by mouth Every Night., Disp: 90 tablet, Rfl: 3  •  meloxicam (MOBIC) 7.5 MG tablet, Take 1 tablet by mouth Daily., Disp: 90 tablet, Rfl: 1  •  multivitamin with minerals tablet tablet, Take 1 tablet by mouth Daily., Disp: , Rfl:   •  tiZANidine (ZANAFLEX) 4 MG tablet, TAKE ONE TABLET BY MOUTH ONCE NIGHTLY AS NEEDED FOR MUSCLE SPASMS **MUST CALL MD FOR APPOINTMENT FOR FURTHER REFILLS, Disp: 14 tablet, Rfl: 0  •  valsartan (DIOVAN) 320 MG tablet, Take 1 tablet by mouth Daily., Disp: 90 tablet, Rfl: 3    Allergies:   Allergies   Allergen Reactions   • Abilify [Aripiprazole] Other (See Comments)     tremors       Objective     Physical Exam: Please see above  Vital Signs:   Vitals:    11/22/22 1516   BP: 168/86   BP Location: Right arm   Patient Position: Sitting   Cuff Size: Adult   Pulse: 75   Resp: 21   Temp: 98.6 °F  "(37 °C)   TempSrc: Infrared   SpO2: 98%   Weight: 120 kg (264 lb 9.6 oz)   Height: 177.8 cm (70\")   PainSc: 0-No pain     Body mass index is 37.97 kg/m².          Assessment / Plan      Assessment/Plan:   Diagnoses and all orders for this visit:    1. Annual physical exam (Primary)    2. Peripheral polyneuropathy  -     Discontinue: gabapentin (NEURONTIN) 600 MG tablet; Take 1 tablet by mouth 3 (Three) Times a Day.  Dispense: 90 tablet; Refill: 2  -     gabapentin (NEURONTIN) 600 MG tablet; Take 1 tablet by mouth 3 (Three) Times a Day.  Dispense: 270 tablet; Refill: 0  -     meloxicam (MOBIC) 7.5 MG tablet; Take 1 tablet by mouth Daily.  Dispense: 90 tablet; Refill: 1    3. Restless leg syndrome  -     Discontinue: gabapentin (NEURONTIN) 600 MG tablet; Take 1 tablet by mouth 3 (Three) Times a Day.  Dispense: 90 tablet; Refill: 2  -     gabapentin (NEURONTIN) 600 MG tablet; Take 1 tablet by mouth 3 (Three) Times a Day.  Dispense: 270 tablet; Refill: 0    4. Therapeutic drug monitoring    5. Immunization due  -     Shingrix Vaccine  -     COVID-19 Bivalent Booster (Pfizer) 12+yrs    6. Essential hypertension  -     hydroCHLOROthiazide (HYDRODIURIL) 25 MG tablet; Take 1 tablet by mouth Daily.  Dispense: 90 tablet; Refill: 3  -     amLODIPine (NORVASC) 10 MG tablet; Take 1 tablet by mouth Daily.  Dispense: 90 tablet; Refill: 3  -     valsartan (DIOVAN) 320 MG tablet; Take 1 tablet by mouth Daily.  Dispense: 90 tablet; Refill: 3    Annual counseling: Constipation regards to diet, exercise, dental care, vaccines.  Agreed COVID booster today.    For knee and ankle pain recommend follow-up with physical therapy.  Recommend Griffith taping for patient's right knee issues.  For right ankle, physical therapy would likely improve talar somatic function.    Back to somatic functions noted    Hypertension well-controlled.  Continue current regimen.    Restless leg syndrome and peripheral neuropathy well controlled.  Can continue " gabapentin.  Refills given and controlled substance contract up-to-date.  Bird reviewed.    Follow Up:   Return in about 3 months (around 2/22/2023) for Recheck.    Mauricio Frey DO  [facility] [place] [facility] [place]

## 2022-12-08 DIAGNOSIS — G62.9 PERIPHERAL POLYNEUROPATHY: ICD-10-CM

## 2022-12-08 RX ORDER — MELOXICAM 7.5 MG/1
7.5 TABLET ORAL DAILY
Qty: 90 TABLET | Refills: 1 | Status: SHIPPED | OUTPATIENT
Start: 2022-12-08

## 2022-12-10 DIAGNOSIS — E78.5 HYPERLIPIDEMIA, UNSPECIFIED HYPERLIPIDEMIA TYPE: ICD-10-CM

## 2022-12-12 DIAGNOSIS — E78.5 HYPERLIPIDEMIA, UNSPECIFIED HYPERLIPIDEMIA TYPE: ICD-10-CM

## 2022-12-12 RX ORDER — LOVASTATIN 40 MG/1
40 TABLET ORAL NIGHTLY
Qty: 90 TABLET | Refills: 3 | Status: SHIPPED | OUTPATIENT
Start: 2022-12-12

## 2022-12-12 RX ORDER — LOVASTATIN 40 MG/1
40 TABLET ORAL NIGHTLY
Qty: 30 TABLET | OUTPATIENT
Start: 2022-12-12

## 2022-12-12 NOTE — TELEPHONE ENCOUNTER
Rx Refill Note  Requested Prescriptions     Pending Prescriptions Disp Refills   • lovastatin (MEVACOR) 40 MG tablet [Pharmacy Med Name: LOVASTATIN 40MG TABLETS] 30 tablet      Sig: TAKE 1 TABLET BY MOUTH EVERY NIGHT      Last filled:  Last office visit with prescribing clinician: 2/9/2022      Next office visit with prescribing clinician: Visit date not found     April MARTHA Garcia MA  12/12/22, 08:56 EST

## 2023-02-23 ENCOUNTER — OFFICE VISIT (OUTPATIENT)
Dept: FAMILY MEDICINE CLINIC | Facility: CLINIC | Age: 58
End: 2023-02-23
Payer: COMMERCIAL

## 2023-02-23 VITALS
DIASTOLIC BLOOD PRESSURE: 82 MMHG | HEIGHT: 70 IN | BODY MASS INDEX: 38.8 KG/M2 | OXYGEN SATURATION: 96 % | HEART RATE: 86 BPM | WEIGHT: 271 LBS | SYSTOLIC BLOOD PRESSURE: 136 MMHG | TEMPERATURE: 98.2 F

## 2023-02-23 DIAGNOSIS — G25.81 RESTLESS LEG SYNDROME: ICD-10-CM

## 2023-02-23 DIAGNOSIS — Z51.81 THERAPEUTIC DRUG MONITORING: Primary | ICD-10-CM

## 2023-02-23 DIAGNOSIS — F32.4 MAJOR DEPRESSIVE DISORDER WITH SINGLE EPISODE, IN PARTIAL REMISSION: ICD-10-CM

## 2023-02-23 DIAGNOSIS — G62.9 PERIPHERAL POLYNEUROPATHY: ICD-10-CM

## 2023-02-23 PROCEDURE — 99214 OFFICE O/P EST MOD 30 MIN: CPT | Performed by: FAMILY MEDICINE

## 2023-02-23 RX ORDER — GABAPENTIN 600 MG/1
600 TABLET ORAL 3 TIMES DAILY
Qty: 270 TABLET | Refills: 0 | Status: SHIPPED | OUTPATIENT
Start: 2023-02-23

## 2023-02-23 RX ORDER — ESCITALOPRAM OXALATE 10 MG/1
10 TABLET ORAL DAILY
Qty: 90 TABLET | Refills: 3 | Status: SHIPPED | OUTPATIENT
Start: 2023-02-23

## 2023-02-23 RX ORDER — GABAPENTIN 300 MG/1
300 CAPSULE ORAL 3 TIMES DAILY
Qty: 90 CAPSULE | Refills: 3 | Status: SHIPPED | OUTPATIENT
Start: 2023-02-23

## 2023-02-23 NOTE — PROGRESS NOTES
Follow Up Office Visit      Patient Name: Zoë Dominguez  : 1965   MRN: [id number]     Chief Complaint:    Chief Complaint   Patient presents with   • Peripheral Neuropathy     Needs refills.       History of Present Illness: Zoë Dominguez is a 57 y.o. female who is here today to follow up with depression, neuropathy, and restless leg syndrome.  She is on gabapentin 600 mg 3 times a day and does not really have too many side effects.  Patient says she tolerates it well and it does help with her pain mostly.  She says she still has a few days a week where she has sharp pain.  She is also reporting that her depression is controlled on Lexapro and needs a refill.    Review of systems positive for pain    Physical exam: Patient's mood and affect is appropriate      Subjective        I have reviewed and the following portions of the patient's history were updated as appropriate: past family history, past medical history, past social history, past surgical history and problem list.    Medications:     Current Outpatient Medications:   •  escitalopram (Lexapro) 10 MG tablet, Take 1 tablet by mouth Daily., Disp: 90 tablet, Rfl: 3  •  gabapentin (NEURONTIN) 600 MG tablet, Take 1 tablet by mouth 3 (Three) Times a Day., Disp: 270 tablet, Rfl: 0  •  meloxicam (MOBIC) 7.5 MG tablet, Take 1 tablet by mouth Daily., Disp: 90 tablet, Rfl: 1  •  amLODIPine (NORVASC) 10 MG tablet, Take 1 tablet by mouth Daily., Disp: 90 tablet, Rfl: 3  •  ASPIRIN 81 PO, Take  by mouth., Disp: , Rfl:   •  B Complex Vitamins (B COMPLEX VITAMIN PO), Take 1 tablet by mouth Daily., Disp: , Rfl:   •  buPROPion XL (Wellbutrin XL) 150 MG 24 hr tablet, Take 1 tablet by mouth Daily., Disp: 90 tablet, Rfl: 3  •  Fexofenadine HCl (ALLEGRA PO), Take  by mouth., Disp: , Rfl:   •  fluticasone (VERAMYST) 27.5 MCG/SPRAY nasal spray, 2 sprays into the nostril(s) as directed by provider Daily., Disp: , Rfl:   •  gabapentin (NEURONTIN) 300 MG  "capsule, Take 1 capsule by mouth 3 (Three) Times a Day., Disp: 90 capsule, Rfl: 3  •  hydroCHLOROthiazide (HYDRODIURIL) 25 MG tablet, Take 1 tablet by mouth Daily., Disp: 90 tablet, Rfl: 3  •  lovastatin (MEVACOR) 40 MG tablet, Take 1 tablet by mouth Every Night., Disp: 90 tablet, Rfl: 3  •  multivitamin with minerals tablet tablet, Take 1 tablet by mouth Daily., Disp: , Rfl:   •  tiZANidine (ZANAFLEX) 4 MG tablet, TAKE ONE TABLET BY MOUTH ONCE NIGHTLY AS NEEDED FOR MUSCLE SPASMS **MUST CALL MD FOR APPOINTMENT FOR FURTHER REFILLS, Disp: 14 tablet, Rfl: 0  •  valsartan (DIOVAN) 320 MG tablet, Take 1 tablet by mouth Daily., Disp: 90 tablet, Rfl: 3    Allergies:   Allergies   Allergen Reactions   • Abilify [Aripiprazole] Other (See Comments)     tremors       Objective     Physical Exam: Please see above  Vital Signs:   Vitals:    02/23/23 1549   BP: 136/82   Pulse: 86   Temp: 98.2 °F (36.8 °C)   SpO2: 96%   Weight: 123 kg (271 lb)   Height: 177.8 cm (70\")   PainSc:   4     Body mass index is 38.88 kg/m².          Assessment / Plan      Assessment/Plan:   Diagnoses and all orders for this visit:    1. Therapeutic drug monitoring (Primary)  -     Compliance Drug Analysis, Ur - Urine, Clean Catch    2. Peripheral polyneuropathy  -     gabapentin (NEURONTIN) 600 MG tablet; Take 1 tablet by mouth 3 (Three) Times a Day.  Dispense: 270 tablet; Refill: 0  -     gabapentin (NEURONTIN) 300 MG capsule; Take 1 capsule by mouth 3 (Three) Times a Day.  Dispense: 90 capsule; Refill: 3    3. Restless leg syndrome  -     gabapentin (NEURONTIN) 600 MG tablet; Take 1 tablet by mouth 3 (Three) Times a Day.  Dispense: 270 tablet; Refill: 0  -     gabapentin (NEURONTIN) 300 MG capsule; Take 1 capsule by mouth 3 (Three) Times a Day.  Dispense: 90 capsule; Refill: 3    4. Major depressive disorder with single episode, in partial remission (HCC)  -     escitalopram (Lexapro) 10 MG tablet; Take 1 tablet by mouth Daily.  Dispense: 90 tablet; " Refill: 3    Neuropathy controlled with a few exacerbations weekly-will increase gabapentin to 900 mg 3 times daily, patient may have to take 600 mg twice a day to avoid sleepiness.  We will discuss how she is taking at next visit.    Continue restless leg treatment with gabapentin.  Continue Lexapro for major depression.      Today we reviewed and discussed the patient's controlled substance.  We reviewed their Bird report, previous drug screens, and drug contract.  They have a drug contract and drug screen on file that is current.  They are compliant with follow-ups every 3 months, and will continue to be compliant per the drug contract.    Follow Up:   Return in about 3 months (around 5/23/2023) for Recheck.    Mauricio Frey DO  [facility] [place]

## 2023-03-02 LAB — DRUGS UR: NORMAL

## 2023-05-17 DIAGNOSIS — G62.9 PERIPHERAL POLYNEUROPATHY: ICD-10-CM

## 2023-05-17 RX ORDER — MELOXICAM 7.5 MG/1
7.5 TABLET ORAL DAILY
Qty: 90 TABLET | Refills: 1 | Status: SHIPPED | OUTPATIENT
Start: 2023-05-17

## 2023-05-17 NOTE — TELEPHONE ENCOUNTER
Rx Refill Note  Requested Prescriptions     Pending Prescriptions Disp Refills   • meloxicam (MOBIC) 7.5 MG tablet 90 tablet 1     Sig: Take 1 tablet by mouth Daily.      Last office visit with prescribing clinician: 2/23/2023   Last telemedicine visit with prescribing clinician: 2/23/2023   Next office visit with prescribing clinician: 5/18/2023                         Would you like a call back once the refill request has been completed: [] Yes [] No    If the office needs to give you a call back, can they leave a voicemail: [] Yes [] No    Bam Tejeda MA  05/17/23, 08:36 EDT

## 2023-05-18 ENCOUNTER — OFFICE VISIT (OUTPATIENT)
Dept: FAMILY MEDICINE CLINIC | Facility: CLINIC | Age: 58
End: 2023-05-18
Payer: COMMERCIAL

## 2023-05-18 VITALS
WEIGHT: 277.6 LBS | RESPIRATION RATE: 18 BRPM | BODY MASS INDEX: 39.74 KG/M2 | SYSTOLIC BLOOD PRESSURE: 136 MMHG | TEMPERATURE: 98 F | DIASTOLIC BLOOD PRESSURE: 74 MMHG | HEART RATE: 70 BPM | OXYGEN SATURATION: 97 % | HEIGHT: 70 IN

## 2023-05-18 DIAGNOSIS — E78.41 ELEVATED LIPOPROTEIN(A): ICD-10-CM

## 2023-05-18 DIAGNOSIS — G62.9 PERIPHERAL POLYNEUROPATHY: ICD-10-CM

## 2023-05-18 DIAGNOSIS — E66.09 CLASS 2 OBESITY DUE TO EXCESS CALORIES WITHOUT SERIOUS COMORBIDITY WITH BODY MASS INDEX (BMI) OF 39.0 TO 39.9 IN ADULT: ICD-10-CM

## 2023-05-18 DIAGNOSIS — R73.03 PREDIABETES: ICD-10-CM

## 2023-05-18 DIAGNOSIS — R53.83 TIREDNESS: Primary | ICD-10-CM

## 2023-05-18 DIAGNOSIS — G25.81 RESTLESS LEG SYNDROME: ICD-10-CM

## 2023-05-18 RX ORDER — GABAPENTIN 600 MG/1
600 TABLET ORAL 3 TIMES DAILY
Qty: 270 TABLET | Refills: 0 | Status: SHIPPED | OUTPATIENT
Start: 2023-05-18

## 2023-05-18 RX ORDER — GABAPENTIN 300 MG/1
300 CAPSULE ORAL 3 TIMES DAILY
Qty: 90 CAPSULE | Refills: 0 | Status: SHIPPED | OUTPATIENT
Start: 2023-05-18

## 2023-05-18 NOTE — PROGRESS NOTES
Follow Up Office Visit      Patient Name: Zoë Dominguez  : 1965   MRN: [id number]     Chief Complaint:    Chief Complaint   Patient presents with   • Anxiety       History of Present Illness: Zoë Dominguez is a 57 y.o. female who is here today to follow up with depression, anxiety, obesity, and neuropathy.  She has restless leg syndrome as well.  Is on Wellbutrin for depression.  Patient says that she is feeling a little down recently.  Overall she is not feeling horrible and says that she is working on her diet and exercise regimen.  Of note, patient has been working on her diet and exercise regimen for very long time.  Over the last couple years she is try to exercise more and reduce her calorie count.  She has not had success with dietary changes and exercise for weight loss.  Continues to have cravings and overeats.  Patient is interested in starting Wegovy.    Patient reports that gabapentin did cause side effects when she takes too much of it during the day.  Has reduced her dose to 600 mg twice daily and 900 mg at night.  This works well for her symptoms.    She has hyperlipidemia which needs to be rechecked today.  Patient also reports the tiredness is concerning so she would like to do some blood work in addition to her yearly monitoring/screening labs.  She does have prediabetes which needs to be reevaluated today with an A1c    Physical exam: Patient's mood and affect is appropriate          Subjective        I have reviewed and the following portions of the patient's history were updated as appropriate: past family history, past medical history, past social history, past surgical history and problem list.    Medications:     Current Outpatient Medications:   •  amLODIPine (NORVASC) 10 MG tablet, Take 1 tablet by mouth Daily., Disp: 90 tablet, Rfl: 3  •  ASPIRIN 81 PO, Take  by mouth., Disp: , Rfl:   •  B Complex Vitamins (B COMPLEX VITAMIN PO), Take 1 tablet by mouth Daily.,  "Disp: , Rfl:   •  buPROPion XL (Wellbutrin XL) 150 MG 24 hr tablet, Take 1 tablet by mouth Daily., Disp: 90 tablet, Rfl: 3  •  escitalopram (Lexapro) 10 MG tablet, Take 1 tablet by mouth Daily., Disp: 90 tablet, Rfl: 3  •  Fexofenadine HCl (ALLEGRA PO), Take  by mouth., Disp: , Rfl:   •  fluticasone (VERAMYST) 27.5 MCG/SPRAY nasal spray, 2 sprays into the nostril(s) as directed by provider Daily., Disp: , Rfl:   •  gabapentin (NEURONTIN) 300 MG capsule, Take 1 capsule by mouth 3 (Three) Times a Day., Disp: 90 capsule, Rfl: 0  •  gabapentin (NEURONTIN) 600 MG tablet, Take 1 tablet by mouth 3 (Three) Times a Day., Disp: 270 tablet, Rfl: 0  •  hydroCHLOROthiazide (HYDRODIURIL) 25 MG tablet, Take 1 tablet by mouth Daily., Disp: 90 tablet, Rfl: 3  •  lovastatin (MEVACOR) 40 MG tablet, Take 1 tablet by mouth Every Night., Disp: 90 tablet, Rfl: 3  •  meloxicam (MOBIC) 7.5 MG tablet, Take 1 tablet by mouth Daily., Disp: 90 tablet, Rfl: 1  •  multivitamin with minerals tablet tablet, Take 1 tablet by mouth Daily., Disp: , Rfl:   •  tiZANidine (ZANAFLEX) 4 MG tablet, TAKE ONE TABLET BY MOUTH ONCE NIGHTLY AS NEEDED FOR MUSCLE SPASMS **MUST CALL MD FOR APPOINTMENT FOR FURTHER REFILLS, Disp: 14 tablet, Rfl: 0  •  valsartan (DIOVAN) 320 MG tablet, Take 1 tablet by mouth Daily., Disp: 90 tablet, Rfl: 3  •  Semaglutide-Weight Management 0.25 MG/0.5ML solution auto-injector, Inject 0.25 mg under the skin into the appropriate area as directed 1 (One) Time Per Week., Disp: 3 mL, Rfl: 0    Allergies:   Allergies   Allergen Reactions   • Abilify [Aripiprazole] Other (See Comments)     tremors       Objective     Physical Exam: Please see above  Vital Signs:   Vitals:    05/18/23 1616   BP: 136/74   Pulse: 70   Resp: 18   Temp: 98 °F (36.7 °C)   SpO2: 97%   Weight: 126 kg (277 lb 9.6 oz)   Height: 177.8 cm (70\")     Body mass index is 39.83 kg/m².          Assessment / Plan      Assessment/Plan:   Diagnoses and all orders for this " visit:    1. Tiredness (Primary)  -     CBC & Differential; Future  -     Comprehensive Metabolic Panel; Future  -     TSH Rfx On Abnormal To Free T4; Future  -     Vitamin B12; Future  -     Urinalysis With Culture If Indicated - Urine, Clean Catch; Future  -     Ferritin; Future    2. Peripheral polyneuropathy  -     gabapentin (NEURONTIN) 600 MG tablet; Take 1 tablet by mouth 3 (Three) Times a Day.  Dispense: 270 tablet; Refill: 0  -     gabapentin (NEURONTIN) 300 MG capsule; Take 1 capsule by mouth 3 (Three) Times a Day.  Dispense: 90 capsule; Refill: 0    3. Restless leg syndrome  -     gabapentin (NEURONTIN) 600 MG tablet; Take 1 tablet by mouth 3 (Three) Times a Day.  Dispense: 270 tablet; Refill: 0  -     gabapentin (NEURONTIN) 300 MG capsule; Take 1 capsule by mouth 3 (Three) Times a Day.  Dispense: 90 capsule; Refill: 0    4. Class 2 obesity due to excess calories without serious comorbidity with body mass index (BMI) of 39.0 to 39.9 in adult  -     Vitamin D,25-Hydroxy; Future  -     Semaglutide-Weight Management 0.25 MG/0.5ML solution auto-injector; Inject 0.25 mg under the skin into the appropriate area as directed 1 (One) Time Per Week.  Dispense: 3 mL; Refill: 0    5. Elevated lipoprotein(a)  -     Lipid Panel; Future    6. Prediabetes  -     Hemoglobin A1c; Future    Tiredness-etiology unsure, but could be related to allergies, virus, depression.  Could be related to gabapentin use.  We will monitor going forward and work-up as above.    Neuropathy and restless leg syndrome controlled on gabapentin dosing above.  Continue current dosing as above, reduced it from last visit    For obesity-recommend checking a vitamin D level and start Wegovy for weight loss.  Patient has failed exercise and dieting for over 6 months.  We will increase the dose monthly.  Follow-up in 3 months for weight check.  Weight loss goal is 4 pounds per month.    Recheck lipids and prediabetes as above.      Today we reviewed  and discussed the patient's controlled substance.  We reviewed their Bird report, previous drug screens, and drug contract.  They have a drug contract and drug screen on file that is current.  They are compliant with follow-ups every 3 months, and will continue to be compliant per the drug contract.    Follow Up:   No follow-ups on file.    Mauricio Frey DO  [facility] [place] [place]

## 2023-05-19 ENCOUNTER — LAB (OUTPATIENT)
Dept: LAB | Facility: HOSPITAL | Age: 58
End: 2023-05-19
Payer: COMMERCIAL

## 2023-05-19 DIAGNOSIS — R73.03 PREDIABETES: ICD-10-CM

## 2023-05-19 DIAGNOSIS — E66.09 CLASS 2 OBESITY DUE TO EXCESS CALORIES WITHOUT SERIOUS COMORBIDITY WITH BODY MASS INDEX (BMI) OF 39.0 TO 39.9 IN ADULT: ICD-10-CM

## 2023-05-19 DIAGNOSIS — E78.41 ELEVATED LIPOPROTEIN(A): ICD-10-CM

## 2023-05-19 DIAGNOSIS — R53.83 TIREDNESS: ICD-10-CM

## 2023-05-19 LAB
25(OH)D3 SERPL-MCNC: 31 NG/ML (ref 30–100)
ALBUMIN SERPL-MCNC: 4.2 G/DL (ref 3.5–5.2)
ALBUMIN/GLOB SERPL: 1.3 G/DL
ALP SERPL-CCNC: 107 U/L (ref 39–117)
ALT SERPL W P-5'-P-CCNC: 21 U/L (ref 1–33)
ANION GAP SERPL CALCULATED.3IONS-SCNC: 9.2 MMOL/L (ref 5–15)
AST SERPL-CCNC: 22 U/L (ref 1–32)
BASOPHILS # BLD AUTO: 0.09 10*3/MM3 (ref 0–0.2)
BASOPHILS NFR BLD AUTO: 1.1 % (ref 0–1.5)
BILIRUB SERPL-MCNC: 0.6 MG/DL (ref 0–1.2)
BILIRUB UR QL STRIP: NEGATIVE
BUN SERPL-MCNC: 15 MG/DL (ref 6–20)
BUN/CREAT SERPL: 20 (ref 7–25)
CALCIUM SPEC-SCNC: 9.6 MG/DL (ref 8.6–10.5)
CHLORIDE SERPL-SCNC: 100 MMOL/L (ref 98–107)
CHOLEST SERPL-MCNC: 161 MG/DL (ref 0–200)
CLARITY UR: CLEAR
CO2 SERPL-SCNC: 27.8 MMOL/L (ref 22–29)
COLOR UR: YELLOW
CREAT SERPL-MCNC: 0.75 MG/DL (ref 0.57–1)
DEPRECATED RDW RBC AUTO: 39.1 FL (ref 37–54)
EGFRCR SERPLBLD CKD-EPI 2021: 93.6 ML/MIN/1.73
EOSINOPHIL # BLD AUTO: 0.46 10*3/MM3 (ref 0–0.4)
EOSINOPHIL NFR BLD AUTO: 5.4 % (ref 0.3–6.2)
ERYTHROCYTE [DISTWIDTH] IN BLOOD BY AUTOMATED COUNT: 13.2 % (ref 12.3–15.4)
FERRITIN SERPL-MCNC: 77.7 NG/ML (ref 13–150)
GLOBULIN UR ELPH-MCNC: 3.3 GM/DL
GLUCOSE SERPL-MCNC: 87 MG/DL (ref 65–99)
GLUCOSE UR STRIP-MCNC: NEGATIVE MG/DL
HBA1C MFR BLD: 6 % (ref 4.8–5.6)
HCT VFR BLD AUTO: 38.7 % (ref 34–46.6)
HDLC SERPL-MCNC: 37 MG/DL (ref 40–60)
HGB BLD-MCNC: 12.8 G/DL (ref 12–15.9)
HGB UR QL STRIP.AUTO: NEGATIVE
IMM GRANULOCYTES # BLD AUTO: 0.02 10*3/MM3 (ref 0–0.05)
IMM GRANULOCYTES NFR BLD AUTO: 0.2 % (ref 0–0.5)
KETONES UR QL STRIP: NEGATIVE
LDLC SERPL CALC-MCNC: 109 MG/DL (ref 0–100)
LDLC/HDLC SERPL: 2.91 {RATIO}
LEUKOCYTE ESTERASE UR QL STRIP.AUTO: NEGATIVE
LYMPHOCYTES # BLD AUTO: 2.06 10*3/MM3 (ref 0.7–3.1)
LYMPHOCYTES NFR BLD AUTO: 24.3 % (ref 19.6–45.3)
MCH RBC QN AUTO: 27.1 PG (ref 26.6–33)
MCHC RBC AUTO-ENTMCNC: 33.1 G/DL (ref 31.5–35.7)
MCV RBC AUTO: 82 FL (ref 79–97)
MONOCYTES # BLD AUTO: 0.62 10*3/MM3 (ref 0.1–0.9)
MONOCYTES NFR BLD AUTO: 7.3 % (ref 5–12)
NEUTROPHILS NFR BLD AUTO: 5.21 10*3/MM3 (ref 1.7–7)
NEUTROPHILS NFR BLD AUTO: 61.7 % (ref 42.7–76)
NITRITE UR QL STRIP: NEGATIVE
NRBC BLD AUTO-RTO: 0 /100 WBC (ref 0–0.2)
PH UR STRIP.AUTO: 6.5 [PH] (ref 5–8)
PLATELET # BLD AUTO: 292 10*3/MM3 (ref 140–450)
PMV BLD AUTO: 10.4 FL (ref 6–12)
POTASSIUM SERPL-SCNC: 3.7 MMOL/L (ref 3.5–5.2)
PROT SERPL-MCNC: 7.5 G/DL (ref 6–8.5)
PROT UR QL STRIP: NEGATIVE
RBC # BLD AUTO: 4.72 10*6/MM3 (ref 3.77–5.28)
SODIUM SERPL-SCNC: 137 MMOL/L (ref 136–145)
SP GR UR STRIP: 1.01 (ref 1–1.03)
TRIGL SERPL-MCNC: 81 MG/DL (ref 0–150)
TSH SERPL DL<=0.05 MIU/L-ACNC: 1.94 UIU/ML (ref 0.27–4.2)
UROBILINOGEN UR QL STRIP: NORMAL
VIT B12 BLD-MCNC: 471 PG/ML (ref 211–946)
VLDLC SERPL-MCNC: 15 MG/DL (ref 5–40)
WBC NRBC COR # BLD: 8.46 10*3/MM3 (ref 3.4–10.8)

## 2023-05-19 PROCEDURE — 82607 VITAMIN B-12: CPT

## 2023-05-19 PROCEDURE — 82306 VITAMIN D 25 HYDROXY: CPT

## 2023-05-19 PROCEDURE — 80061 LIPID PANEL: CPT

## 2023-05-19 PROCEDURE — 82728 ASSAY OF FERRITIN: CPT

## 2023-05-19 PROCEDURE — 36415 COLL VENOUS BLD VENIPUNCTURE: CPT

## 2023-05-19 PROCEDURE — 80050 GENERAL HEALTH PANEL: CPT

## 2023-05-19 PROCEDURE — 81003 URINALYSIS AUTO W/O SCOPE: CPT

## 2023-05-19 PROCEDURE — 83036 HEMOGLOBIN GLYCOSYLATED A1C: CPT

## 2023-05-22 ENCOUNTER — TELEPHONE (OUTPATIENT)
Dept: FAMILY MEDICINE CLINIC | Facility: CLINIC | Age: 58
End: 2023-05-22

## 2023-05-22 NOTE — TELEPHONE ENCOUNTER
THE PATIENT STATES THAT HER MEDICATION WEGOVEY WILL NEED A PRIOR AUTHORIZATION SHE WOULD LIKE TO KNOW IF THE DOCTOR CAN START THAT PROCESS FOR HER     CALL [phone]

## 2023-05-24 ENCOUNTER — PRIOR AUTHORIZATION (OUTPATIENT)
Dept: FAMILY MEDICINE CLINIC | Facility: CLINIC | Age: 58
End: 2023-05-24
Payer: COMMERCIAL

## 2023-05-24 NOTE — TELEPHONE ENCOUNTER
5/24/23    PA has been denied    ester monroy   (Key: [id number])  Wegovy 0.25MG/0.5ML auto-injectors     Form  [employer] Electronic PA Form (2017 NCPDP)  Created  14 minutes ago  Sent to Plan  7 minutes ago  Plan Response  7 minutes ago  Submit Clinical Questions  Determination  Message from Plan  This request cannot be processed due to the medication is not covered by the plan.              5/24/23    PA sent to plan    ester porfirio   (Key: [id number])  Wegovy 0.25MG/0.5ML auto-injectors     Form  [employer] Electronic PA Form (2017 NCPDP)  Created  8 minutes ago  Sent to Plan  less than a minute ago  Determination  Wait for Questions  [facility] typically responds with questions in less than 15 minutes, but may take up to 24 hours.

## 2023-05-24 NOTE — TELEPHONE ENCOUNTER
PA for Wegovy was denied stating that it is not covered under her plan. Provider has been notified, will wait for further instruction. Spoke to  patient and advised. Verbalized understanding

## 2023-05-30 DIAGNOSIS — F32.4 MAJOR DEPRESSIVE DISORDER WITH SINGLE EPISODE, IN PARTIAL REMISSION: ICD-10-CM

## 2023-05-30 RX ORDER — ESCITALOPRAM OXALATE 10 MG/1
10 TABLET ORAL DAILY
Qty: 90 TABLET | Refills: 3 | Status: SHIPPED | OUTPATIENT
Start: 2023-05-30

## 2023-08-22 ENCOUNTER — TRANSCRIBE ORDERS (OUTPATIENT)
Dept: ADMINISTRATIVE | Facility: HOSPITAL | Age: 58
End: 2023-08-22
Payer: COMMERCIAL

## 2023-08-22 DIAGNOSIS — G25.81 RESTLESS LEG SYNDROME: ICD-10-CM

## 2023-08-22 DIAGNOSIS — G62.9 PERIPHERAL POLYNEUROPATHY: ICD-10-CM

## 2023-08-22 DIAGNOSIS — Z12.31 SCREENING MAMMOGRAM FOR BREAST CANCER: Primary | ICD-10-CM

## 2023-08-22 RX ORDER — GABAPENTIN 600 MG/1
600 TABLET ORAL 3 TIMES DAILY
Qty: 270 TABLET | Refills: 0 | Status: SHIPPED | OUTPATIENT
Start: 2023-08-22

## 2023-08-25 DIAGNOSIS — F32.4 MAJOR DEPRESSIVE DISORDER WITH SINGLE EPISODE, IN PARTIAL REMISSION: ICD-10-CM

## 2023-08-25 RX ORDER — ESCITALOPRAM OXALATE 10 MG/1
10 TABLET ORAL DAILY
Qty: 90 TABLET | Refills: 3 | Status: SHIPPED | OUTPATIENT
Start: 2023-08-25

## 2023-08-30 DIAGNOSIS — F32.A ANXIETY AND DEPRESSION: ICD-10-CM

## 2023-08-30 DIAGNOSIS — F41.9 ANXIETY AND DEPRESSION: ICD-10-CM

## 2023-08-30 RX ORDER — BUPROPION HYDROCHLORIDE 150 MG/1
150 TABLET ORAL DAILY
Qty: 90 TABLET | Refills: 3 | Status: SHIPPED | OUTPATIENT
Start: 2023-08-30

## 2023-08-31 ENCOUNTER — OFFICE VISIT (OUTPATIENT)
Dept: FAMILY MEDICINE CLINIC | Facility: CLINIC | Age: 58
End: 2023-08-31
Payer: COMMERCIAL

## 2023-08-31 VITALS
SYSTOLIC BLOOD PRESSURE: 148 MMHG | WEIGHT: 279.6 LBS | HEART RATE: 44 BPM | OXYGEN SATURATION: 96 % | BODY MASS INDEX: 40.03 KG/M2 | HEIGHT: 70 IN | TEMPERATURE: 98.4 F | DIASTOLIC BLOOD PRESSURE: 86 MMHG

## 2023-08-31 DIAGNOSIS — M79.671 RIGHT FOOT PAIN: ICD-10-CM

## 2023-08-31 DIAGNOSIS — G25.81 RESTLESS LEG SYNDROME: ICD-10-CM

## 2023-08-31 DIAGNOSIS — R00.1 BRADYCARDIA: ICD-10-CM

## 2023-08-31 DIAGNOSIS — G47.33 OSA (OBSTRUCTIVE SLEEP APNEA): Primary | ICD-10-CM

## 2023-08-31 DIAGNOSIS — G62.9 PERIPHERAL POLYNEUROPATHY: ICD-10-CM

## 2023-08-31 PROBLEM — Z86.69 HISTORY OF OBSTRUCTIVE SLEEP APNEA: Status: RESOLVED | Noted: 2022-05-20 | Resolved: 2023-08-31

## 2023-08-31 PROCEDURE — 99214 OFFICE O/P EST MOD 30 MIN: CPT | Performed by: FAMILY MEDICINE

## 2023-08-31 RX ORDER — MODAFINIL 200 MG/1
200 TABLET ORAL DAILY
Qty: 30 TABLET | Refills: 2 | Status: SHIPPED | OUTPATIENT
Start: 2023-08-31

## 2023-08-31 RX ORDER — GABAPENTIN 600 MG/1
600 TABLET ORAL 3 TIMES DAILY
Qty: 270 TABLET | Refills: 0 | Status: CANCELLED | OUTPATIENT
Start: 2023-08-31

## 2023-08-31 RX ORDER — GABAPENTIN 300 MG/1
300 CAPSULE ORAL 3 TIMES DAILY
Qty: 90 CAPSULE | Refills: 0 | Status: CANCELLED | OUTPATIENT
Start: 2023-08-31

## 2023-08-31 NOTE — PROGRESS NOTES
Follow Up Office Visit      Patient Name: Zoë Dominguez  : 1965   MRN: [id number]     Chief Complaint:    Chief Complaint   Patient presents with    Anxiety    Depression     Knot on right foot       History of Present Illness: Zoë Dominguez is a 58 y.o. female who is here today to follow up with CY.  Patient has continued daytime tiredness.  Over the last year or so we have been working on her tiredness and some mild depression and anxiety.  Overall she is just tired on a daily basis.  She uses a CPAP machine.  Patient reports that Lexapro and Wellbutrin helped some but she is willing to come off of those if she does not need them.  Patient is here to follow-up regarding peripheral neuropathy which she takes gabapentin for.  This is working okay for her and she also takes it for restless leg syndrome.      She has noticed a knot in the right foot that started recently.  Is slightly painful but overall does not bother her.      Physical exam: Patient mood is blunted.  Seems tired today.      Subjective        I have reviewed and the following portions of the patient's history were updated as appropriate: past family history, past medical history, past social history, past surgical history and problem list.    Medications:     Current Outpatient Medications:     amLODIPine (NORVASC) 10 MG tablet, Take 1 tablet by mouth Daily., Disp: 90 tablet, Rfl: 3    ASPIRIN 81 PO, Take  by mouth., Disp: , Rfl:     B Complex Vitamins (B COMPLEX VITAMIN PO), Take 1 tablet by mouth Daily., Disp: , Rfl:     buPROPion XL (Wellbutrin XL) 150 MG 24 hr tablet, Take 1 tablet by mouth Daily., Disp: 90 tablet, Rfl: 3    escitalopram (Lexapro) 10 MG tablet, Take 1 tablet by mouth Daily., Disp: 90 tablet, Rfl: 3    Fexofenadine HCl (ALLEGRA PO), Take  by mouth., Disp: , Rfl:     fluticasone (VERAMYST) 27.5 MCG/SPRAY nasal spray, 2 sprays into the nostril(s) as directed by provider Daily., Disp: , Rfl:      "gabapentin (NEURONTIN) 300 MG capsule, Take 1 capsule by mouth 3 (Three) Times a Day., Disp: 90 capsule, Rfl: 0    gabapentin (NEURONTIN) 600 MG tablet, Take 1 tablet by mouth 3 (Three) Times a Day., Disp: 270 tablet, Rfl: 0    hydroCHLOROthiazide (HYDRODIURIL) 25 MG tablet, Take 1 tablet by mouth Daily., Disp: 90 tablet, Rfl: 3    lovastatin (MEVACOR) 40 MG tablet, Take 1 tablet by mouth Every Night., Disp: 90 tablet, Rfl: 3    meloxicam (MOBIC) 7.5 MG tablet, Take 1 tablet by mouth Daily., Disp: 90 tablet, Rfl: 1    multivitamin with minerals tablet tablet, Take 1 tablet by mouth Daily., Disp: , Rfl:     tiZANidine (ZANAFLEX) 4 MG tablet, TAKE ONE TABLET BY MOUTH ONCE NIGHTLY AS NEEDED FOR MUSCLE SPASMS **MUST CALL MD FOR APPOINTMENT FOR FURTHER REFILLS, Disp: 14 tablet, Rfl: 0    valsartan (DIOVAN) 320 MG tablet, Take 1 tablet by mouth Daily., Disp: 90 tablet, Rfl: 3    modafinil (PROVIGIL) 200 MG tablet, Take 1 tablet by mouth Daily., Disp: 30 tablet, Rfl: 2    Allergies:   Allergies   Allergen Reactions    Abilify [Aripiprazole] Other (See Comments)     tremors       Objective     Physical Exam: Please see above  Vital Signs:   Vitals:    08/31/23 1437   BP: 148/86   Pulse: (!) 44   Temp: 98.4 øF (36.9 øC)   SpO2: 96%   Weight: 127 kg (279 lb 9.6 oz)   Height: 177.8 cm (70\")     Body mass index is 40.12 kg/mý.          Assessment / Plan      Assessment/Plan:   Diagnoses and all orders for this visit:    1. CY (obstructive sleep apnea) (Primary)  -     modafinil (PROVIGIL) 200 MG tablet; Take 1 tablet by mouth Daily.  Dispense: 30 tablet; Refill: 2    2. Restless leg syndrome    3. Peripheral polyneuropathy    4. Right foot pain    5. Bradycardia    Start modafinil for CY.  Patient has persistent daytime sleepiness despite sleep apnea machine use.  Follow-up in 3 months or sooner as needed for dose adjustment.  Maximum dose is 400 mg.    Continue gabapentin for restless leg syndrome and peripheral " neuropathy.    Bradycardia noted on her Eitel's.  On exam heart rate normal.  Monitor going forward    Patient had foot pain today and looks like she may have foot cyst or arthritis noted.  Mild swelling on the top of her foot, and is tender.  Mild fluctuance noted.  Monitor going forward.  Patient to wear comfortable shoes.  Use ice and ibuprofen as needed for swelling control.    Today we reviewed and discussed the patient's controlled substance.  We reviewed their Bird report, previous drug screens, and drug contract.  They have a drug contract and drug screen on file that is current.  They are compliant with follow-ups every 3 months, and will continue to be compliant per the drug contract.       We will discuss adding modafinil to controlled substance contract next visit if she is doing well on it    Follow Up:   Return in about 3 months (around 11/30/2023) for Recheck.    Mauricio Frey DO  [facility] [place] [facility] [place]

## 2023-09-27 ENCOUNTER — PRIOR AUTHORIZATION (OUTPATIENT)
Dept: FAMILY MEDICINE CLINIC | Facility: CLINIC | Age: 58
End: 2023-09-27
Payer: COMMERCIAL

## 2023-09-27 DIAGNOSIS — G25.81 RESTLESS LEG SYNDROME: ICD-10-CM

## 2023-09-27 DIAGNOSIS — G62.9 PERIPHERAL POLYNEUROPATHY: ICD-10-CM

## 2023-09-27 RX ORDER — GABAPENTIN 300 MG/1
300 CAPSULE ORAL 3 TIMES DAILY
Qty: 90 CAPSULE | Refills: 2 | Status: SHIPPED | OUTPATIENT
Start: 2023-09-27

## 2023-09-27 NOTE — TELEPHONE ENCOUNTER
9/27/23      PA sent to plan and is pending    Zoë Dominguez Key: [id number] - PA Case ID: [id number]  Drug  Modafinil 200MG tablets

## 2023-10-04 DIAGNOSIS — G47.33 OSA (OBSTRUCTIVE SLEEP APNEA): ICD-10-CM

## 2023-10-12 ENCOUNTER — TELEPHONE (OUTPATIENT)
Dept: FAMILY MEDICINE CLINIC | Facility: CLINIC | Age: 58
End: 2023-10-12
Payer: COMMERCIAL

## 2023-10-12 ENCOUNTER — OFFICE VISIT (OUTPATIENT)
Dept: FAMILY MEDICINE CLINIC | Facility: CLINIC | Age: 58
End: 2023-10-12
Payer: COMMERCIAL

## 2023-10-12 VITALS
DIASTOLIC BLOOD PRESSURE: 80 MMHG | OXYGEN SATURATION: 98 % | HEIGHT: 70 IN | TEMPERATURE: 98.7 F | HEART RATE: 80 BPM | SYSTOLIC BLOOD PRESSURE: 128 MMHG | RESPIRATION RATE: 20 BRPM | WEIGHT: 285.2 LBS | BODY MASS INDEX: 40.83 KG/M2

## 2023-10-12 DIAGNOSIS — R05.1 ACUTE COUGH: ICD-10-CM

## 2023-10-12 DIAGNOSIS — R06.02 SHORTNESS OF BREATH: Primary | ICD-10-CM

## 2023-10-12 DIAGNOSIS — R06.2 WHEEZING: ICD-10-CM

## 2023-10-12 RX ORDER — DEXTROMETHORPHAN HYDROBROMIDE AND PROMETHAZINE HYDROCHLORIDE 15; 6.25 MG/5ML; MG/5ML
5 SYRUP ORAL 4 TIMES DAILY PRN
Qty: 180 ML | Refills: 0 | Status: SHIPPED | OUTPATIENT
Start: 2023-10-12

## 2023-10-12 RX ORDER — ALBUTEROL SULFATE 90 UG/1
2 AEROSOL, METERED RESPIRATORY (INHALATION) EVERY 4 HOURS PRN
Qty: 18 G | Refills: 0 | Status: SHIPPED | OUTPATIENT
Start: 2023-10-12

## 2023-10-12 NOTE — TELEPHONE ENCOUNTER
Hub/front can read       Please make pt an apt for the first open spot with an provider in office       ----- Message from Zoë Dominguez sent at 10/12/2023 11:10 AM EDT -----  Regarding: Breathing.   Contact: [phone]  Good afternoon Dr Frey I'm messaging you for. I'm having difficulty taking deep breaths I have a cough not much mucus is coming up I heard my chest is hurting tight like I have bronchitis it hurts to take a deep breath like I'm being squeezed my ribs are hurting should I just come in or go to urgent care?

## 2023-10-12 NOTE — PROGRESS NOTES
Office Note     Name: Zoë Dominguez    : 1965     MRN: [id number]     Primary Concern  Shortness of Breath (Started Saturday. Pain when taking a deep breath. Tested Friday and today for covid and was negative. Sides of chest are also tender with palpation.), Wheezing, and Cough (Painful)    Subjective     Subjective     History of Present Illness:  Zoë Dominguez is a 58 y.o. female who presents today to [facility] for  the following  .    HPI:   Shortness of Breath  This is a new problem. The current episode started in the past 7 days. Associated symptoms include wheezing (more at night). Pertinent negatives include no vomiting. Exacerbated by: No known exposures. Associated symptoms comments: Cough.  Pain with deep breathing..   Patient got her flu shot on flu shot on  of last month. She has taken 2 home COVID test and both have been negative.    Review of Systems:   Review of Systems   Constitutional:  Positive for chills and fatigue.   HENT:  Positive for voice change. Negative for trouble swallowing.    Respiratory:  Positive for cough (productive cough yellow-green), shortness of breath and wheezing (more at night). Negative for choking.    Gastrointestinal:  Negative for diarrhea, nausea and vomiting.   Neurological:  Positive for dizziness and headache. Negative for light-headedness.   Psychiatric/Behavioral:  Positive for sleep disturbance (decreased due to symptoms.).        The following portions of the patient's history were reviewed and updated as appropriate: allergies, current medications, past family history, past medical history, past social history, past surgical history and problem list.    Past Medical History:   Past Medical History:   Diagnosis Date    Abnormal Pap smear of cervix 1989    dysplasia, treated with cone bx    Acute left-sided muscle weakness 2021    [facility]    Arthritis     Compression fracture of  cervical spine 2021    CTA at [facility]    Depression     Hypertension     Peripheral neuropathy     Restless leg syndrome     Restless leg syndrome     Sleep apnea     Sleep apnea in adult        Past Surgical History:   Past Surgical History:   Procedure Laterality Date    CERVICAL CONE BIOPSY       SECTION      COLONOSCOPY  2019    [facility]    COLPOSCOPY  2019    COSMETIC SURGERY  1973    ENDOMETRIAL ABLATION  2019    ENDOMETRIAL ABLATION  2019    [facility]    KNEE CARTILAGE SURGERY Right 2018    TUBAL ABDOMINAL LIGATION  1994       Immunizations:   Immunization History   Administered Date(s) Administered    COVID-19 (MODERNA) 1st,2nd,3rd Dose Monovalent 2021, 2021, 11/15/2021    COVID-19 (PFIZER) BIVALENT 12+YRS 2022    Flu Vaccine Intradermal Quad 18-64YR 10/14/2022    Fluzone (or Fluarix & Flulaval for VFC) >6mos 2020, 10/14/2022, 2023    Influenza Injectable Mdck Pf Quad 10/14/2022    Influenza, Unspecified 2021    Shingrix 2022, 2022    Tdap 07/10/2017        Current Medications:     Current Outpatient Medications:     amLODIPine (NORVASC) 10 MG tablet, Take 1 tablet by mouth Daily., Disp: 90 tablet, Rfl: 3    ASPIRIN 81 PO, Take  by mouth., Disp: , Rfl:     B Complex Vitamins (B COMPLEX VITAMIN PO), Take 1 tablet by mouth Daily., Disp: , Rfl:     buPROPion XL (Wellbutrin XL) 150 MG 24 hr tablet, Take 1 tablet by mouth Daily., Disp: 90 tablet, Rfl: 3    escitalopram (Lexapro) 10 MG tablet, Take 1 tablet by mouth Daily., Disp: 90 tablet, Rfl: 3    Fexofenadine HCl (ALLEGRA PO), Take  by mouth., Disp: , Rfl:     fluticasone (VERAMYST) 27.5 MCG/SPRAY nasal spray, 2 sprays into the nostril(s) as directed by provider Daily., Disp: , Rfl:     gabapentin (NEURONTIN) 300 MG capsule, Take 1 capsule by mouth 3 (Three) Times a Day., Disp: 90 capsule, Rfl: 2    gabapentin (NEURONTIN) 600 MG tablet, Take 1 tablet by mouth 3 (Three) Times a  Day., Disp: 270 tablet, Rfl: 0    hydroCHLOROthiazide (HYDRODIURIL) 25 MG tablet, Take 1 tablet by mouth Daily., Disp: 90 tablet, Rfl: 3    lovastatin (MEVACOR) 40 MG tablet, Take 1 tablet by mouth Every Night., Disp: 90 tablet, Rfl: 3    multivitamin with minerals tablet tablet, Take 1 tablet by mouth Daily., Disp: , Rfl:     tiZANidine (ZANAFLEX) 4 MG tablet, TAKE ONE TABLET BY MOUTH ONCE NIGHTLY AS NEEDED FOR MUSCLE SPASMS **MUST CALL MD FOR APPOINTMENT FOR FURTHER REFILLS, Disp: 14 tablet, Rfl: 0    valsartan (DIOVAN) 320 MG tablet, Take 1 tablet by mouth Daily., Disp: 90 tablet, Rfl: 3    albuterol sulfate  (90 Base) MCG/ACT inhaler, Inhale 2 puffs Every 4 (Four) Hours As Needed for Wheezing or Shortness of Air., Disp: 18 g, Rfl: 0    promethazine-dextromethorphan (PROMETHAZINE-DM) 6.25-15 MG/5ML syrup, Take 5 mL by mouth 4 (Four) Times a Day As Needed for Cough., Disp: 180 mL, Rfl: 0    Allergies:   Allergies   Allergen Reactions    Abilify [Aripiprazole] Other (See Comments)     tremors       Family History:   Family History   Problem Relation Age of Onset    Arthritis Mother     Diabetes Mother     Heart attack Mother     Hypertension Mother     Obesity Mother     Migraines Mother     Migraines Brother     Stroke Brother     Aneurysm Brother         brain    Diabetes Maternal Grandmother     Stroke Maternal Grandmother     Breast cancer Neg Hx     Ovarian cancer Neg Hx        Social History:   Social History     Socioeconomic History    Marital status: Single    Number of children: 2    Highest education level: Some college, no degree   Tobacco Use    Smoking status: Never    Smokeless tobacco: Never   Vaping Use    Vaping Use: Never used   Substance and Sexual Activity    Alcohol use: Yes     Comment: seldom    Drug use: Never    Sexual activity: Yes     Partners: Male     Birth control/protection: Tubal ligation           Objective     Objective     Vital Signs  /80   Pulse 80   Temp 98.7 °F  "(37.1 °C) (Infrared)   Resp 20   Ht 177.8 cm (70\")   Wt 129 kg (285 lb 3.2 oz)   SpO2 98%   BMI 40.92 kg/m²   Estimated body mass index is 40.92 kg/m² as calculated from the following:    Height as of this encounter: 177.8 cm (70\").    Weight as of this encounter: 129 kg (285 lb 3.2 oz).        Physical Exam:  Physical Exam  Constitutional:       General: She is not in acute distress.     Appearance: She is not toxic-appearing.   HENT:      Head: Normocephalic and atraumatic.      Nose: Nose normal.      Mouth/Throat:      Mouth: Mucous membranes are moist.      Pharynx: Oropharynx is clear. Posterior oropharyngeal erythema present. No oropharyngeal exudate.   Eyes:      Conjunctiva/sclera: Conjunctivae normal.      Pupils: Pupils are equal, round, and reactive to light.   Neck:      Vascular: No carotid bruit.   Cardiovascular:      Rate and Rhythm: Normal rate.   Pulmonary:      Effort: Pulmonary effort is normal.      Breath sounds: Wheezing present.   Musculoskeletal:      Cervical back: Normal range of motion and neck supple.   Lymphadenopathy:      Cervical: No cervical adenopathy.   Skin:     General: Skin is warm and dry.      Capillary Refill: Capillary refill takes less than 2 seconds.      Coloration: Skin is not jaundiced or pale.      Findings: No erythema.   Neurological:      Mental Status: She is alert and oriented to person, place, and time.         Procedures     Results for orders placed or performed in visit on 05/19/23   Comprehensive Metabolic Panel    Specimen: Blood   Result Value Ref Range    Glucose 87 65 - 99 mg/dL    BUN 15 6 - 20 mg/dL    Creatinine 0.75 0.57 - 1.00 mg/dL    Sodium 137 136 - 145 mmol/L    Potassium 3.7 3.5 - 5.2 mmol/L    Chloride 100 98 - 107 mmol/L    CO2 27.8 22.0 - 29.0 mmol/L    Calcium 9.6 8.6 - 10.5 mg/dL    Total Protein 7.5 6.0 - 8.5 g/dL    Albumin 4.2 3.5 - 5.2 g/dL    ALT (SGPT) 21 1 - 33 U/L    AST (SGOT) 22 1 - 32 U/L    Alkaline Phosphatase 107 39 - " 117 U/L    Total Bilirubin 0.6 0.0 - 1.2 mg/dL    Globulin 3.3 gm/dL    A/G Ratio 1.3 g/dL    BUN/Creatinine Ratio 20.0 7.0 - 25.0    Anion Gap 9.2 5.0 - 15.0 mmol/L    eGFR 93.6 >60.0 mL/min/1.73   TSH Rfx On Abnormal To Free T4    Specimen: Blood   Result Value Ref Range    TSH 1.940 0.270 - 4.200 uIU/mL   Vitamin B12    Specimen: Blood   Result Value Ref Range    Vitamin B-12 471 211 - 946 pg/mL   Vitamin D,25-Hydroxy    Specimen: Blood   Result Value Ref Range    25 Hydroxy, Vitamin D 31.0 30.0 - 100.0 ng/ml   Urinalysis With Culture If Indicated - Urine, Clean Catch    Specimen: Urine, Clean Catch   Result Value Ref Range    Color, UA Yellow Yellow, Straw    Appearance, UA Clear Clear    pH, UA 6.5 5.0 - 8.0    Specific Gravity, UA 1.010 1.005 - 1.030    Glucose, UA Negative Negative    Ketones, UA Negative Negative    Bilirubin, UA Negative Negative    Blood, UA Negative Negative    Protein, UA Negative Negative    Leuk Esterase, UA Negative Negative    Nitrite, UA Negative Negative    Urobilinogen, UA 0.2 E.U./dL 0.2 - 1.0 E.U./dL   Ferritin    Specimen: Blood   Result Value Ref Range    Ferritin 77.70 13.00 - 150.00 ng/mL   Lipid Panel    Specimen: Blood   Result Value Ref Range    Total Cholesterol 161 0 - 200 mg/dL    Triglycerides 81 0 - 150 mg/dL    HDL Cholesterol 37 (L) 40 - 60 mg/dL    LDL Cholesterol  109 (H) 0 - 100 mg/dL    VLDL Cholesterol 15 5 - 40 mg/dL    LDL/HDL Ratio 2.91    Hemoglobin A1c    Specimen: Blood   Result Value Ref Range    Hemoglobin A1C 6.00 (H) 4.80 - 5.60 %   CBC Auto Differential    Specimen: Blood   Result Value Ref Range    WBC 8.46 3.40 - 10.80 10*3/mm3    RBC 4.72 3.77 - 5.28 10*6/mm3    Hemoglobin 12.8 12.0 - 15.9 g/dL    Hematocrit 38.7 34.0 - 46.6 %    MCV 82.0 79.0 - 97.0 fL    MCH 27.1 26.6 - 33.0 pg    MCHC 33.1 31.5 - 35.7 g/dL    RDW 13.2 12.3 - 15.4 %    RDW-SD 39.1 37.0 - 54.0 fl    MPV 10.4 6.0 - 12.0 fL    Platelets 292 140 - 450 10*3/mm3    Neutrophil % 61.7  42.7 - 76.0 %    Lymphocyte % 24.3 19.6 - 45.3 %    Monocyte % 7.3 5.0 - 12.0 %    Eosinophil % 5.4 0.3 - 6.2 %    Basophil % 1.1 0.0 - 1.5 %    Immature Grans % 0.2 0.0 - 0.5 %    Neutrophils, Absolute 5.21 1.70 - 7.00 10*3/mm3    Lymphocytes, Absolute 2.06 0.70 - 3.10 10*3/mm3    Monocytes, Absolute 0.62 0.10 - 0.90 10*3/mm3    Eosinophils, Absolute 0.46 (H) 0.00 - 0.40 10*3/mm3    Basophils, Absolute 0.09 0.00 - 0.20 10*3/mm3    Immature Grans, Absolute 0.02 0.00 - 0.05 10*3/mm3    nRBC 0.0 0.0 - 0.2 /100 WBC           Assessment / Plan    Assessment and Plan   Diagnoses and all orders for this visit:    1. Shortness of breath (Primary)  -     POCT SARS-CoV-2 Antigen SKYLAR  -     POCT Influenza A/B  -     albuterol sulfate  (90 Base) MCG/ACT inhaler; Inhale 2 puffs Every 4 (Four) Hours As Needed for Wheezing or Shortness of Air.  Dispense: 18 g; Refill: 0  -     XR Chest PA & Lateral; Future    2. Wheezing  -     POCT SARS-CoV-2 Antigen SKYLAR  -     POCT Influenza A/B  -     albuterol sulfate  (90 Base) MCG/ACT inhaler; Inhale 2 puffs Every 4 (Four) Hours As Needed for Wheezing or Shortness of Air.  Dispense: 18 g; Refill: 0  -     XR Chest PA & Lateral; Future    3. Acute cough  -     promethazine-dextromethorphan (PROMETHAZINE-DM) 6.25-15 MG/5ML syrup; Take 5 mL by mouth 4 (Four) Times a Day As Needed for Cough.  Dispense: 180 mL; Refill: 0  -     1 tablespoon of honey as needed.  -     Increase oral hydration.    Discussed possible differential diagnoses, testing, treatment, recommended non-pharmacological interventions, risks, warning signs to monitor for that would indicate need for follow-up in clinic or ER. If no improvement with these regimens or you have new or worsening symptoms follow-up. Patient verbalizes understanding and agreement with plan of care. Denies further needs or concerns.     Follow Up   Return if symptoms worsen or fail to improve.        Patient was given instructions and  counseling regarding her condition or for health maintenance advice. Please see specific information pulled into the AVS if appropriate.     Merna Nesbitt  [facility] [place]

## 2023-10-12 NOTE — LETTER
October 12, 2023     Patient: Zoë Dominguez   YOB: 1965   Date of Visit: 10/12/2023       To Whom It May Concern:    It is my medical opinion that Zoë Dominguez may return to work on 10-.         Sincerely,        LANDON Hummel    CC: No Recipients

## 2023-10-23 ENCOUNTER — HOSPITAL ENCOUNTER (OUTPATIENT)
Dept: MAMMOGRAPHY | Facility: HOSPITAL | Age: 58
Discharge: HOME | End: 2023-10-23
Admitting: FAMILY MEDICINE
Payer: COMMERCIAL

## 2023-10-23 ENCOUNTER — OFFICE VISIT (OUTPATIENT)
Dept: OBSTETRICS AND GYNECOLOGY | Facility: CLINIC | Age: 58
End: 2023-10-23
Payer: COMMERCIAL

## 2023-10-23 VITALS
WEIGHT: 281 LBS | HEIGHT: 70 IN | BODY MASS INDEX: 40.23 KG/M2 | SYSTOLIC BLOOD PRESSURE: 130 MMHG | DIASTOLIC BLOOD PRESSURE: 86 MMHG

## 2023-10-23 DIAGNOSIS — Z01.419 WELL WOMAN EXAM WITH ROUTINE GYNECOLOGICAL EXAM: Primary | ICD-10-CM

## 2023-10-23 DIAGNOSIS — Z12.31 SCREENING MAMMOGRAM FOR BREAST CANCER: ICD-10-CM

## 2023-10-23 DIAGNOSIS — R87.810 PAPANICOLAOU SMEAR OF CERVIX WITH POSITIVE HIGH RISK HUMAN PAPILLOMA VIRUS (HPV) TEST: ICD-10-CM

## 2023-10-23 PROCEDURE — 77067 SCR MAMMO BI INCL CAD: CPT

## 2023-10-23 PROCEDURE — 99396 PREV VISIT EST AGE 40-64: CPT | Performed by: NURSE PRACTITIONER

## 2023-10-23 PROCEDURE — 77063 BREAST TOMOSYNTHESIS BI: CPT

## 2023-10-23 NOTE — PROGRESS NOTES
Subjective   Chief Complaint   Patient presents with    Annual Exam     Well woman exam     Zoë Dominguez is a 58 y.o. year old  menopausal female presenting to be seen for her annual exam.  This past year she has not been on hormone replacement therapy.  There has not been vaginal bleeding in the last 12 months.  Menopausal symptoms are present (hot flashes and night sweats) but not affecting her quality of life . Her last pap 10/2022 was normal cytology, + hpv 18. She had a colposcopy 2022 with low grade changes and recommendation for repeat cotest in 12 months.   She had her screening mammogram earlier today.   She last had a colonoscopy in 2019, had polyps removed, thinks they told her 5 year follow up.   SEXUAL Hx:  She is currently sexually active.  In the past year there there has been NO new sexual partners.    Condoms are not needed because she is not sexually active.  She would not like to be screened for STD's at today's exam.  [place] is painful: not asked  HEALTH Hx:  She exercises regularly: no (but is planning to start exercising more).  She wears her seat belt: yes.  She has concerns about domestic violence: no.  She has noticed changes in height: no.  OTHER THINGS SHE WANTS TO DISCUSS TODAY:  Nothing else    The following portions of the patient's history were reviewed and updated as appropriate:problem list, current medications, allergies, past family history, past medical history, past social history, and past surgical history.    Social History    Tobacco Use      Smoking status: Never      Smokeless tobacco: Never      Review of Systems  Constitutional POS: nothing reported    NEG: anorexia or night sweats   Genitourinary POS: ROBERT is present but it IS NOT effecting her ADL's    NEG: dysuria or hematuria      Gastointestinal POS: nothing reported    NEG: bloating, change in bowel habits, melena, or reflux symptoms   Integument POS: nothing reported    NEG: moles that are  "changing in size, shape, color or rashes   Breast POS: nothing reported    NEG: persistent breast lump, skin dimpling, or nipple discharge        Objective   /86 (BP Location: Right arm, Patient Position: Sitting, Cuff Size: Large Adult)   Ht 177.8 cm (70\")   Wt 127 kg (281 lb)   LMP  (LMP Unknown)   BMI 40.32 kg/m²     General:  well developed; well nourished  no acute distress   Skin:  No suspicious lesions seen   Thyroid: normal to inspection and palpation   Breasts:  Examined in supine position  Symmetric without masses or skin dimpling  Nipples normal without inversion, lesions or discharge  There are no palpable axillary nodes  Some tenderness noted in  upper outer quadrant of  right breast, could be related to mammogram earlier today   Abdomen: soft, non-tender; no masses  no umbilical or inguinal hernias are present  no hepato-splenomegaly   Pelvis: Clinical staff was present for exam  External genitalia:  normal appearance of the external genitalia including Bartholin's and [place]'s glands.  :  urethral meatus normal;  Vaginal:  normal pink mucosa without prolapse or lesions.  Cervix:  normal appearance.  Uterus:  normal size, shape and consistency.  Adnexa:  normal bimanual exam of the adnexa.  Rectal:  digital rectal exam not performed; anus visually normal appearing.        Assessment   Well woman with routine gynecological exam  She is up to date on all relevant gynecologic and colorectal screenings       Plan   Pap and STD testing was done today.  If she does not receive the results of the Pap within 2 weeks  time, she was instructed to call to find out the results.  I explained to Zoë that because she is being seen in follow-up of a previously abnormal Pap smear, her next Pap interval will be scheduled based on today's results.   She was encouraged to get yearly mammograms.  She should report any palpable breast lump(s) or skin changes regardless of mammographic findings.  I explained to " Zoë that notification regarding her mammogram results will come from the center performing the study.  Our office will not be routinely calling with mammogram results.  It is her responsibility to make sure that the results from the mammogram are communicated to her by the breast center.  If she has any questions about the results, she is welcome to call our office anytime. If her breast tenderness persists rtc  The importance of keeping all planned follow-up and taking all medications as prescribed was emphasized.  Today I discussed with Zoë the total recommended calcium intake for a post-menopausal female is 1200 mg.  Ideally this should be from dietary sources.  I reviewed calcium content in various foods including milk, fortified orange juice and yogurt.  If she cannot get sufficient calcium through dietary means, it is recommended to supplement with either a multivitamin or calcium to reach her daily goal.  I also reviewed the difference in the bioavailability of calcium carbonate and calcium citrate containing supplements and the importance of taking calcium carbonate containing products with food. Finally, vitamin D's role in calcium absorption was reviewed and a total daily vitamin D intake of 600 units was recommended.  Her vaccine record was reviewed and updated.  Follow up for annual exam 1 year    No orders of the defined types were placed in this encounter.         This note was electronically signed.  Meghann Adler, LANDON  October 23, 2023

## 2023-10-24 LAB — REF LAB TEST METHOD: NORMAL

## 2023-11-02 LAB
EXPIRATION DATE: 0
EXPIRATION DATE: 0
FLUAV AG NPH QL: NEGATIVE
FLUBV AG NPH QL: NEGATIVE
INTERNAL CONTROL: NORMAL
INTERNAL CONTROL: NORMAL
Lab: 0
Lab: 0
SARS-COV-2 AG UPPER RESP QL IA.RAPID: NOT DETECTED

## 2023-11-20 DIAGNOSIS — I10 ESSENTIAL HYPERTENSION: ICD-10-CM

## 2023-11-20 RX ORDER — HYDROCHLOROTHIAZIDE 25 MG/1
25 TABLET ORAL DAILY
Qty: 90 TABLET | Refills: 3 | Status: SHIPPED | OUTPATIENT
Start: 2023-11-20

## 2023-11-20 RX ORDER — VALSARTAN 320 MG/1
320 TABLET ORAL DAILY
Qty: 90 TABLET | Refills: 3 | Status: SHIPPED | OUTPATIENT
Start: 2023-11-20

## 2023-11-20 RX ORDER — AMLODIPINE BESYLATE 10 MG/1
10 TABLET ORAL DAILY
Qty: 90 TABLET | Refills: 3 | Status: SHIPPED | OUTPATIENT
Start: 2023-11-20

## 2023-11-30 ENCOUNTER — OFFICE VISIT (OUTPATIENT)
Dept: FAMILY MEDICINE CLINIC | Facility: CLINIC | Age: 58
End: 2023-11-30
Payer: COMMERCIAL

## 2023-11-30 VITALS
BODY MASS INDEX: 41.09 KG/M2 | HEART RATE: 80 BPM | DIASTOLIC BLOOD PRESSURE: 85 MMHG | TEMPERATURE: 98.2 F | WEIGHT: 287 LBS | OXYGEN SATURATION: 98 % | HEIGHT: 70 IN | SYSTOLIC BLOOD PRESSURE: 192 MMHG

## 2023-11-30 DIAGNOSIS — G25.81 RESTLESS LEG SYNDROME: ICD-10-CM

## 2023-11-30 DIAGNOSIS — G62.9 PERIPHERAL POLYNEUROPATHY: ICD-10-CM

## 2023-11-30 DIAGNOSIS — G47.33 OSA (OBSTRUCTIVE SLEEP APNEA): Primary | ICD-10-CM

## 2023-11-30 DIAGNOSIS — J01.10 ACUTE NON-RECURRENT FRONTAL SINUSITIS: ICD-10-CM

## 2023-11-30 DIAGNOSIS — E66.01 MORBID (SEVERE) OBESITY DUE TO EXCESS CALORIES: ICD-10-CM

## 2023-11-30 DIAGNOSIS — Z51.81 THERAPEUTIC DRUG MONITORING: ICD-10-CM

## 2023-11-30 RX ORDER — GABAPENTIN 600 MG/1
600 TABLET ORAL 3 TIMES DAILY
Qty: 270 TABLET | Refills: 0 | Status: SHIPPED | OUTPATIENT
Start: 2023-11-30

## 2023-11-30 RX ORDER — MODAFINIL 200 MG/1
200 TABLET ORAL DAILY
Qty: 30 TABLET | Refills: 2 | Status: SHIPPED | OUTPATIENT
Start: 2023-11-30

## 2023-11-30 RX ORDER — AMOXICILLIN AND CLAVULANATE POTASSIUM 875; 125 MG/1; MG/1
1 TABLET, FILM COATED ORAL 2 TIMES DAILY
Qty: 20 TABLET | Refills: 0 | Status: SHIPPED | OUTPATIENT
Start: 2023-11-30

## 2023-11-30 NOTE — PROGRESS NOTES
Follow Up Office Visit      Patient Name: Zoë Dominguez  : 1965   MRN: [id number]     Chief Complaint:    Chief Complaint   Patient presents with    Nasal Congestion     Cough, no fever, no aches, productive cough, yellowish green. Has had cough since she was here last. Thinks it is allergies that turned into sinus infection.        History of Present Illness: Zoë Dominguez is a 58 y.o. female who is here today to follow up with active cough, CY, peripheral neuropathy, restless leg symptoms.  Patient is on gabapentin which helps and she needs refill on 600 mg tablet.  Patient reports compliance on medications.  She never did  modafinil for CY because her insurance did not approve it.  She continues to have daytime tiredness.      Also here with recurrent URI.  Symptoms started within the last week and her symptoms are getting worse.  Mild nasal congestion, cough.    Was sick last month as well.  No fever.  Negative COVID test at home      Physical exam: Patient's lungs CTA bilateral.  Mood and affect appropriate.      Subjective        I have reviewed and the following portions of the patient's history were updated as appropriate: past family history, past medical history, past social history, past surgical history and problem list.    Medications:     Current Outpatient Medications:     albuterol sulfate  (90 Base) MCG/ACT inhaler, Inhale 2 puffs Every 4 (Four) Hours As Needed for Wheezing or Shortness of Air., Disp: 18 g, Rfl: 0    amLODIPine (NORVASC) 10 MG tablet, Take 1 tablet by mouth Daily., Disp: 90 tablet, Rfl: 3    ASPIRIN 81 PO, Take  by mouth., Disp: , Rfl:     B Complex Vitamins (B COMPLEX VITAMIN PO), Take 1 tablet by mouth Daily., Disp: , Rfl:     buPROPion XL (Wellbutrin XL) 150 MG 24 hr tablet, Take 1 tablet by mouth Daily., Disp: 90 tablet, Rfl: 3    escitalopram (Lexapro) 10 MG tablet, Take 1 tablet by mouth Daily., Disp: 90 tablet, Rfl: 3     "Fexofenadine HCl (ALLEGRA PO), Take  by mouth., Disp: , Rfl:     fluticasone (VERAMYST) 27.5 MCG/SPRAY nasal spray, 2 sprays into the nostril(s) as directed by provider Daily., Disp: , Rfl:     gabapentin (NEURONTIN) 300 MG capsule, Take 1 capsule by mouth 3 (Three) Times a Day., Disp: 90 capsule, Rfl: 2    gabapentin (NEURONTIN) 600 MG tablet, Take 1 tablet by mouth 3 (Three) Times a Day., Disp: 270 tablet, Rfl: 0    hydroCHLOROthiazide (HYDRODIURIL) 25 MG tablet, Take 1 tablet by mouth Daily., Disp: 90 tablet, Rfl: 3    lovastatin (MEVACOR) 40 MG tablet, Take 1 tablet by mouth Every Night., Disp: 90 tablet, Rfl: 3    multivitamin with minerals tablet tablet, Take 1 tablet by mouth Daily., Disp: , Rfl:     tiZANidine (ZANAFLEX) 4 MG tablet, TAKE ONE TABLET BY MOUTH ONCE NIGHTLY AS NEEDED FOR MUSCLE SPASMS **MUST CALL MD FOR APPOINTMENT FOR FURTHER REFILLS, Disp: 14 tablet, Rfl: 0    valsartan (DIOVAN) 320 MG tablet, Take 1 tablet by mouth Daily., Disp: 90 tablet, Rfl: 3    amoxicillin-clavulanate (AUGMENTIN) 875-125 MG per tablet, Take 1 tablet by mouth 2 (Two) Times a Day., Disp: 20 tablet, Rfl: 0    modafinil (PROVIGIL) 200 MG tablet, Take 1 tablet by mouth Daily., Disp: 30 tablet, Rfl: 2    Allergies:   Allergies   Allergen Reactions    Abilify [Aripiprazole] Other (See Comments)     tremors       Objective     Physical Exam: Please see above  Vital Signs:   Vitals:    11/30/23 1638   BP: (!) 192/85   Pulse: 80   Temp: 98.2 °F (36.8 °C)   SpO2: 98%   Weight: 130 kg (287 lb)   Height: 177.8 cm (70\")   PainSc:   8     Body mass index is 41.18 kg/m².          Assessment / Plan      Assessment/Plan:   Diagnoses and all orders for this visit:    1. CY (obstructive sleep apnea) (Primary)  -     modafinil (PROVIGIL) 200 MG tablet; Take 1 tablet by mouth Daily.  Dispense: 30 tablet; Refill: 2    2. Peripheral polyneuropathy  -     gabapentin (NEURONTIN) 600 MG tablet; Take 1 tablet by mouth 3 (Three) Times a Day.  " Dispense: 270 tablet; Refill: 0    3. Therapeutic drug monitoring    4. Morbid (severe) obesity due to excess calories    5. Restless leg syndrome  -     gabapentin (NEURONTIN) 600 MG tablet; Take 1 tablet by mouth 3 (Three) Times a Day.  Dispense: 270 tablet; Refill: 0    6. Acute non-recurrent frontal sinusitis  -     amoxicillin-clavulanate (AUGMENTIN) 875-125 MG per tablet; Take 1 tablet by mouth 2 (Two) Times a Day.  Dispense: 20 tablet; Refill: 0    We will retry prior authorization for modafinil.  Patient has CY and has continued daytime sleepiness despite CPAP machine use.  She is compliant on CPAP machine and would benefit from having modafinil trial.    Peripheral neuropathy controlled on 900 mg gabapentin 3 times daily.  600 mg sent in for refills today.  Patient call for refills as needed follow-up in 3 months    Acute sinusitis noted on exam today.  Will treat with Augmentin.  Patient's symptoms may persist for up to 1 month after completion of antibiotics.  Patient to follow-up with any worsening symptoms after antibiotic completion.    I discussed diet and exercise with patient today.    Class 3 Severe Obesity (BMI >=40). Obesity-related health conditions include the following: obstructive sleep apnea. Obesity is unchanged. BMI is is above average; BMI management plan is completed. We discussed portion control and increasing exercise.  Offered referral to weight management program as well, patient deferred for now.  Patient can call for referral      Today we reviewed and discussed the patient's controlled substance.  We reviewed their [place] report, previous drug screens, and drug contract.  They have a drug contract and drug screen on file that is current.  They are compliant with follow-ups every 3 months, and will continue to be compliant per the drug contract.     Follow Up:   Return in about 3 months (around 2/29/2024) for Recheck.    Mauricio Frey DO  [facility] [place]

## 2024-01-05 DIAGNOSIS — M53.3 PAIN OF RIGHT SACROILIAC JOINT: ICD-10-CM

## 2024-01-05 RX ORDER — TIZANIDINE 4 MG/1
TABLET ORAL
Qty: 14 TABLET | Refills: 0 | OUTPATIENT
Start: 2024-01-05

## 2024-01-05 NOTE — TELEPHONE ENCOUNTER
PN that she would need to contact her new PCP for this med.  She sent the message by mistake.  She has already gotten this taken care of

## 2024-02-15 DIAGNOSIS — E78.5 HYPERLIPIDEMIA, UNSPECIFIED HYPERLIPIDEMIA TYPE: ICD-10-CM

## 2024-02-15 RX ORDER — LOVASTATIN 40 MG/1
40 TABLET ORAL NIGHTLY
Qty: 90 TABLET | Refills: 3 | Status: SHIPPED | OUTPATIENT
Start: 2024-02-15

## 2024-03-01 ENCOUNTER — LAB (OUTPATIENT)
Dept: LAB | Facility: HOSPITAL | Age: 59
End: 2024-03-01
Payer: COMMERCIAL

## 2024-03-01 ENCOUNTER — OFFICE VISIT (OUTPATIENT)
Dept: FAMILY MEDICINE CLINIC | Facility: CLINIC | Age: 59
End: 2024-03-01
Payer: COMMERCIAL

## 2024-03-01 VITALS
HEART RATE: 75 BPM | TEMPERATURE: 97.8 F | SYSTOLIC BLOOD PRESSURE: 146 MMHG | OXYGEN SATURATION: 97 % | DIASTOLIC BLOOD PRESSURE: 88 MMHG | WEIGHT: 279 LBS | BODY MASS INDEX: 39.94 KG/M2 | HEIGHT: 70 IN

## 2024-03-01 DIAGNOSIS — E66.01 MORBID (SEVERE) OBESITY DUE TO EXCESS CALORIES: ICD-10-CM

## 2024-03-01 DIAGNOSIS — G47.33 OSA (OBSTRUCTIVE SLEEP APNEA): ICD-10-CM

## 2024-03-01 DIAGNOSIS — F32.A ANXIETY AND DEPRESSION: ICD-10-CM

## 2024-03-01 DIAGNOSIS — F41.9 ANXIETY AND DEPRESSION: ICD-10-CM

## 2024-03-01 DIAGNOSIS — R73.03 PREDIABETES: ICD-10-CM

## 2024-03-01 DIAGNOSIS — G25.81 RESTLESS LEG SYNDROME: ICD-10-CM

## 2024-03-01 DIAGNOSIS — G62.9 PERIPHERAL POLYNEUROPATHY: Primary | ICD-10-CM

## 2024-03-01 DIAGNOSIS — Z51.81 THERAPEUTIC DRUG MONITORING: ICD-10-CM

## 2024-03-01 DIAGNOSIS — R53.83 OTHER FATIGUE: ICD-10-CM

## 2024-03-01 LAB
HBA1C MFR BLD: 6.1 % (ref 4.8–5.6)
MAGNESIUM SERPL-MCNC: 2.1 MG/DL (ref 1.6–2.6)

## 2024-03-01 PROCEDURE — 36415 COLL VENOUS BLD VENIPUNCTURE: CPT

## 2024-03-01 PROCEDURE — 83735 ASSAY OF MAGNESIUM: CPT

## 2024-03-01 PROCEDURE — 83036 HEMOGLOBIN GLYCOSYLATED A1C: CPT

## 2024-03-01 RX ORDER — PREGABALIN 75 MG/1
CAPSULE ORAL
Qty: 98 CAPSULE | Refills: 0 | Status: SHIPPED | OUTPATIENT
Start: 2024-03-01 | End: 2024-03-29

## 2024-03-01 RX ORDER — MODAFINIL 200 MG/1
200 TABLET ORAL DAILY
Qty: 90 TABLET | Refills: 0 | Status: SHIPPED | OUTPATIENT
Start: 2024-03-01

## 2024-03-01 RX ORDER — BUPROPION HYDROCHLORIDE 150 MG/1
300 TABLET ORAL DAILY
Qty: 180 TABLET | Refills: 0 | Status: SHIPPED | OUTPATIENT
Start: 2024-03-01

## 2024-03-01 NOTE — PROGRESS NOTES
Follow Up Office Visit      Patient Name: Zoë Dominguez  : 1965   MRN: [id number]     Chief Complaint:    Chief Complaint   Patient presents with    Fatigue     Wants blood work, legs cramp at night. No energy at all.       History of Present Illness: Zoë Dominguez is a 58 y.o. female who is here today to follow up with tiredness with a diagnosis of CY, she is on modafinil,Peripheral neuropathy, morbid obesity, restless leg syndrome, anxiety, depression, and prediabetes.    Blood pressure elevated today.  Patient not monitoring at home.    Patient feels tired most days.  She has CY and reports compliance using the CPAP machine.  She is for more than 4 hours most of the time.  She is on modafinil.  She reports that over the last few months she is felt more tired.    Patient has prediabetes but does report increased thirst and urination recently.  Needs repeat A1c    She reports compliance to modafinil and still says that she feels tired.    She is on Wellbutrin for depression and her depression and tiredness has worsened.    Patient is reporting pain in her knees, ankles, hips, shoulders.  She has had chronic pain like this for a long time and says it feels like its deep type of pain.  People have mentioned fibromyalgia to her before.  She never been diagnosed with fibromyalgia.    We did discuss patient's weight and going to the weight clinic today.  She is morbidly obese and would benefit from weight loss therapy.    Neuropathic symptoms are uncontrolled on gabapentin but she still having deep type of pain as noted above.    Physical exam: Patient's mood is depressed.  Affect blunted.          Subjective        I have reviewed and the following portions of the patient's history were updated as appropriate: past family history, past medical history, past social history, past surgical history and problem list.    Medications:     Current Outpatient Medications:     albuterol sulfate HFA  "108 (90 Base) MCG/ACT inhaler, Inhale 2 puffs Every 4 (Four) Hours As Needed for Wheezing or Shortness of Air., Disp: 18 g, Rfl: 0    amLODIPine (NORVASC) 10 MG tablet, Take 1 tablet by mouth Daily., Disp: 90 tablet, Rfl: 3    ASPIRIN 81 PO, Take  by mouth., Disp: , Rfl:     B Complex Vitamins (B COMPLEX VITAMIN PO), Take 1 tablet by mouth Daily., Disp: , Rfl:     buPROPion XL (Wellbutrin XL) 150 MG 24 hr tablet, Take 2 tablets by mouth Daily. In morning, Disp: 180 tablet, Rfl: 0    escitalopram (Lexapro) 10 MG tablet, Take 1 tablet by mouth Daily., Disp: 90 tablet, Rfl: 3    Fexofenadine HCl (ALLEGRA PO), Take  by mouth., Disp: , Rfl:     fluticasone (VERAMYST) 27.5 MCG/SPRAY nasal spray, 2 sprays into the nostril(s) as directed by provider Daily., Disp: , Rfl:     hydroCHLOROthiazide (HYDRODIURIL) 25 MG tablet, Take 1 tablet by mouth Daily., Disp: 90 tablet, Rfl: 3    lovastatin (MEVACOR) 40 MG tablet, Take 1 tablet by mouth Every Night., Disp: 90 tablet, Rfl: 3    modafinil (PROVIGIL) 200 MG tablet, Take 1 tablet by mouth Daily. In morning, Disp: 90 tablet, Rfl: 0    multivitamin with minerals tablet tablet, Take 1 tablet by mouth Daily., Disp: , Rfl:     tiZANidine (ZANAFLEX) 4 MG tablet, TAKE ONE TABLET BY MOUTH ONCE NIGHTLY AS NEEDED FOR MUSCLE SPASMS **MUST CALL MD FOR APPOINTMENT FOR FURTHER REFILLS, Disp: 14 tablet, Rfl: 0    valsartan (DIOVAN) 320 MG tablet, Take 1 tablet by mouth Daily., Disp: 90 tablet, Rfl: 3    pregabalin (LYRICA) 75 MG capsule, Take 1 capsule by mouth 2 (Two) Times a Day for 7 days, THEN 2 capsules 2 (Two) Times a Day for 21 days., Disp: 98 capsule, Rfl: 0    Allergies:   Allergies   Allergen Reactions    Abilify [Aripiprazole] Other (See Comments)     tremors       Objective     Physical Exam: Please see above  Vital Signs:   Vitals:    03/01/24 0858   BP: 146/88   Pulse: 75   Temp: 97.8 °F (36.6 °C)   SpO2: 97%   Weight: 127 kg (279 lb)   Height: 177.8 cm (70\")   PainSc:   8 "     Body mass index is 40.03 kg/m².          Assessment / Plan      Assessment/Plan:   Diagnoses and all orders for this visit:    1. Peripheral polyneuropathy (Primary)  -     pregabalin (LYRICA) 75 MG capsule; Take 1 capsule by mouth 2 (Two) Times a Day for 7 days, THEN 2 capsules 2 (Two) Times a Day for 21 days.  Dispense: 98 capsule; Refill: 0    2. Therapeutic drug monitoring  -     Compliance Drug Analysis, Ur - Urine, Clean Catch    3. CY (obstructive sleep apnea)  -     modafinil (PROVIGIL) 200 MG tablet; Take 1 tablet by mouth Daily. In morning  Dispense: 90 tablet; Refill: 0    4. Morbid (severe) obesity due to excess calories  -     Ambulatory Referral to Weight Management Program    5. Restless leg syndrome  -     Magnesium; Future    6. Other fatigue    7. Anxiety and depression  -     buPROPion XL (Wellbutrin XL) 150 MG 24 hr tablet; Take 2 tablets by mouth Daily. In morning  Dispense: 180 tablet; Refill: 0    8. Prediabetes  -     Hemoglobin A1c; Future    Peripheral neuropathy uncontrolled on gabapentin, but patient still having a lot of pain throughout her body.  Concern for possible fibromyalgia.  Given her chronic pain history and her peripheral neuropathy diagnosis, we will switch her over to Lyrica.  Stop gabapentin and restart Lyrica at dose prescribed.  Titrate as [id number] in 1 month for further titration    Drug monitoring performed today.    CY-symptoms not totally controlled.  Continue modafinil 20 mg, will increase Wellbutrin as noted below    Pression uncontrolled-increase Wellbutrin to 300 mg.    Morbid obesity-referred to weight management.    Restless leg symptoms and leg pain-check magnesium level.  Otherwise we started Lyrica to help with these symptoms.    Prediabetes-recheck A1c.  Patient reported worsening symptoms related to diabetes like increased thirst and urination.    Today we reviewed and discussed the patient's controlled substance.  We reviewed their Bird  report, previous drug screens, and drug contract.  They have a drug contract and drug screen on file that is current.  They are compliant with follow-ups every 3 months, and will continue to be compliant per the drug contract.     Follow Up:   Return in about 4 weeks (around 3/29/2024) for Recheck chronic pain, Labs today.    Mauricio Frey,   [facility] [place]

## 2024-03-10 LAB — DRUGS UR: NORMAL

## 2024-03-11 ENCOUNTER — PRIOR AUTHORIZATION (OUTPATIENT)
Dept: FAMILY MEDICINE CLINIC | Facility: CLINIC | Age: 59
End: 2024-03-11
Payer: COMMERCIAL

## 2024-03-11 NOTE — TELEPHONE ENCOUNTER
3/11/24    PA sent to plan and is pending      Zoë Dominguez   (Key: [id number])  Sent to Plan today  Drug  Lyrica 75MG capsules

## 2024-03-14 NOTE — TELEPHONE ENCOUNTER
[employer] requested additional information via covermymeds. Questions answered, determination still pending.

## 2024-03-19 ENCOUNTER — PRIOR AUTHORIZATION (OUTPATIENT)
Dept: FAMILY MEDICINE CLINIC | Facility: CLINIC | Age: 59
End: 2024-03-19
Payer: COMMERCIAL

## 2024-03-19 NOTE — TELEPHONE ENCOUNTER
3/19/24    PA for Killian Dominguez   (Key: [id number])  PA Case ID #: [id number]  PA Case: [id number], Status: Approved, Coverage Starts on: 3/18/2024 12:00:00 AM, Coverage Ends on: 6/16/2024 12:00:00 AM.  Authorization Expiration Date: 6/15/2024  Drug  Lyrica 75MG capsules

## 2024-03-21 ENCOUNTER — TELEPHONE (OUTPATIENT)
Dept: FAMILY MEDICINE CLINIC | Facility: CLINIC | Age: 59
End: 2024-03-21

## 2024-03-21 NOTE — TELEPHONE ENCOUNTER
Spoke to patient and advised of Dr. Frey message. Patient voiced understanding and stated that she will pick her Lyrica up tomorrow.

## 2024-03-21 NOTE — TELEPHONE ENCOUNTER
Caller: Zoë Dominguez    Relationship: Self    Best call back number: [phone]    What medication are you requesting: GABAPENTIN 600    If a prescription is needed, what is your preferred pharmacy and phone number: [id number] - [place], [place] - [address] AT [facility] [address] - [phone]  - [phone]     Additional notes: UNTIL PATIENT CAN GET THE LYRICA, SHE STILL NEEDS THE GABAPENTIN

## 2024-04-12 ENCOUNTER — TELEPHONE (OUTPATIENT)
Dept: FAMILY MEDICINE CLINIC | Facility: CLINIC | Age: 59
End: 2024-04-12

## 2024-04-12 NOTE — TELEPHONE ENCOUNTER
Hub to relay,    Patient is scheduled for a follow up 4/18 if possible she could follow up with him on this issue at that appointment? I tried to call but no answer.

## 2024-04-12 NOTE — TELEPHONE ENCOUNTER
Caller: Zoë Dominguez    Relationship: Self    Best call back number: [phone]     What is the best time to reach you: ANYTIME    Who are you requesting to speak with (clinical staff, provider,  specific staff member): PCP/MA        What was the call regarding: PATIENT HAS A SORE SPOT ON HER LEFT BREAST ON THE SIDE UNDER ARM PIT    Is it okay if the provider responds through MyChart: CALLBACK

## 2024-04-12 NOTE — TELEPHONE ENCOUNTER
Name: Zoë Dominguez      Relationship: Self      Best Callback Number: [phone]      HUB PROVIDED THE RELAY MESSAGE FROM THE OFFICE      PATIENT: VOICED UNDERSTANDING AND HAS NO FURTHER QUESTIONS AT THIS TIME    ADDITIONAL INFORMATION:

## 2024-04-18 ENCOUNTER — OFFICE VISIT (OUTPATIENT)
Dept: FAMILY MEDICINE CLINIC | Facility: CLINIC | Age: 59
End: 2024-04-18
Payer: COMMERCIAL

## 2024-04-18 VITALS
HEART RATE: 64 BPM | DIASTOLIC BLOOD PRESSURE: 76 MMHG | TEMPERATURE: 97.8 F | SYSTOLIC BLOOD PRESSURE: 140 MMHG | WEIGHT: 280.2 LBS | BODY MASS INDEX: 40.11 KG/M2 | HEIGHT: 70 IN

## 2024-04-18 DIAGNOSIS — G62.9 PERIPHERAL POLYNEUROPATHY: Primary | ICD-10-CM

## 2024-04-18 DIAGNOSIS — N63.32 MASS OF AXILLARY TAIL OF LEFT BREAST: ICD-10-CM

## 2024-04-18 PROCEDURE — 99214 OFFICE O/P EST MOD 30 MIN: CPT | Performed by: FAMILY MEDICINE

## 2024-04-18 RX ORDER — ROPINIROLE 0.25 MG/1
0.25 TABLET, FILM COATED ORAL NIGHTLY
Qty: 90 TABLET | Refills: 0 | Status: SHIPPED | OUTPATIENT
Start: 2024-04-18

## 2024-04-18 RX ORDER — PREGABALIN 150 MG/1
150 CAPSULE ORAL 2 TIMES DAILY
Qty: 60 CAPSULE | Refills: 0 | Status: SHIPPED | OUTPATIENT
Start: 2024-04-18

## 2024-04-18 NOTE — PROGRESS NOTES
Follow Up Office Visit      Patient Name: Zoë Dominguez  : 1965   MRN: [id number]     Chief Complaint:    Chief Complaint   Patient presents with    chronic pain     4 week follow up   Patient thinks that the pregablin is helping but thinks she may need a higher dose        History of Present Illness: Zoë Dominguez is a 58 y.o. female who is here today to follow up with chronic neuropathic pain which has improved slightly on Lyrica.  She also has symptoms that started at night and can go throughout the nighttime that seem like a restless leg type of symptoms.  Patient reports no side effects from Lyrica.  She was happy to get off of gabapentin.    Patient reports that the Lyrica pain control is similar to gabapentin.    She also has some pain/lump in the left breast region.  Patient noticed that recently after moving some things throughout her house.  She had a mammogram last fall.    Physical exam: Patient's mood and affect is appropriate.      Subjective        I have reviewed and the following portions of the patient's history were updated as appropriate: past family history, past medical history, past social history, past surgical history and problem list.    Medications:     Current Outpatient Medications:     albuterol sulfate  (90 Base) MCG/ACT inhaler, Inhale 2 puffs Every 4 (Four) Hours As Needed for Wheezing or Shortness of Air., Disp: 18 g, Rfl: 0    amLODIPine (NORVASC) 10 MG tablet, Take 1 tablet by mouth Daily., Disp: 90 tablet, Rfl: 3    ASPIRIN 81 PO, Take  by mouth., Disp: , Rfl:     B Complex Vitamins (B COMPLEX VITAMIN PO), Take 1 tablet by mouth Daily., Disp: , Rfl:     buPROPion XL (Wellbutrin XL) 150 MG 24 hr tablet, Take 2 tablets by mouth Daily. In morning, Disp: 180 tablet, Rfl: 0    escitalopram (Lexapro) 10 MG tablet, Take 1 tablet by mouth Daily., Disp: 90 tablet, Rfl: 3    Fexofenadine HCl (ALLEGRA PO), Take  by mouth., Disp: , Rfl:     fluticasone  "(VERAMYST) 27.5 MCG/SPRAY nasal spray, 2 sprays into the nostril(s) as directed by provider Daily., Disp: , Rfl:     hydroCHLOROthiazide (HYDRODIURIL) 25 MG tablet, Take 1 tablet by mouth Daily., Disp: 90 tablet, Rfl: 3    lovastatin (MEVACOR) 40 MG tablet, Take 1 tablet by mouth Every Night., Disp: 90 tablet, Rfl: 3    modafinil (PROVIGIL) 200 MG tablet, Take 1 tablet by mouth Daily. In morning, Disp: 90 tablet, Rfl: 0    multivitamin with minerals tablet tablet, Take 1 tablet by mouth Daily., Disp: , Rfl:     tiZANidine (ZANAFLEX) 4 MG tablet, TAKE ONE TABLET BY MOUTH ONCE NIGHTLY AS NEEDED FOR MUSCLE SPASMS **MUST CALL MD FOR APPOINTMENT FOR FURTHER REFILLS, Disp: 14 tablet, Rfl: 0    valsartan (DIOVAN) 320 MG tablet, Take 1 tablet by mouth Daily., Disp: 90 tablet, Rfl: 3    pregabalin (Lyrica) 150 MG capsule, Take 1 capsule by mouth 2 (Two) Times a Day., Disp: 60 capsule, Rfl: 0    rOPINIRole (REQUIP) 0.25 MG tablet, Take 1 tablet by mouth Every Night. Take 1 hour before bedtime., Disp: 90 tablet, Rfl: 0    Allergies:   Allergies   Allergen Reactions    Abilify [Aripiprazole] Other (See Comments)     tremors       Objective     Physical Exam: Please see above  Vital Signs:   Vitals:    04/18/24 1107   BP: 140/76   Pulse: 64   Temp: 97.8 °F (36.6 °C)   TempSrc: Infrared   Weight: 127 kg (280 lb 3.2 oz)   Height: 177.8 cm (70\")   PainSc:   8   PainLoc: Shoulder     Body mass index is 40.2 kg/m².          Assessment / Plan      Assessment/Plan:   Diagnoses and all orders for this visit:    1. Peripheral polyneuropathy (Primary)  -     pregabalin (Lyrica) 150 MG capsule; Take 1 capsule by mouth 2 (Two) Times a Day.  Dispense: 60 capsule; Refill: 0  -     rOPINIRole (REQUIP) 0.25 MG tablet; Take 1 tablet by mouth Every Night. Take 1 hour before bedtime.  Dispense: 90 tablet; Refill: 0    2. Mass of axillary tail of left breast  -     Mammo Diagnostic Digital Tomosynthesis Bilateral With CAD; Future    New breast lump " noted.  Lesion was difficult to assess due to habitus but was approximately 1 cm in diameter.  Will get diagnostic mammogram with ultrasound as needed.    Peripheral neuropathy improving overall but not super well-controlled.  We will add on Requip to help with the restless leg symptoms to see if this will improve her pain further.  If not, we will increase Lyrica to 200 mg twice daily.  Will consider switching over to Mirapex as well    Today we reviewed and discussed the patient's controlled substance.  We reviewed their Bird report, previous drug screens, and drug contract.  They have a drug contract and drug screen on file that is current.  They are compliant with follow-ups every 3 months, and will continue to be compliant per the drug contract.     Follow Up:   Return in about 3 months (around 7/18/2024) for neuropathy.    Mauricio Frey DO  [facility] [place] [facility] [place]

## 2024-04-21 ENCOUNTER — PATIENT MESSAGE (OUTPATIENT)
Dept: FAMILY MEDICINE CLINIC | Facility: CLINIC | Age: 59
End: 2024-04-21
Payer: COMMERCIAL

## 2024-04-23 DIAGNOSIS — F41.9 ANXIETY AND DEPRESSION: Primary | ICD-10-CM

## 2024-04-23 DIAGNOSIS — F32.A ANXIETY AND DEPRESSION: Primary | ICD-10-CM

## 2024-04-23 RX ORDER — BUPROPION HYDROCHLORIDE 300 MG/1
300 TABLET ORAL DAILY
Qty: 90 TABLET | Refills: 1 | Status: SHIPPED | OUTPATIENT
Start: 2024-04-23

## 2024-05-06 ENCOUNTER — PATIENT MESSAGE (OUTPATIENT)
Dept: FAMILY MEDICINE CLINIC | Facility: CLINIC | Age: 59
End: 2024-05-06
Payer: COMMERCIAL

## 2024-05-07 RX ORDER — AZELASTINE 1 MG/ML
2 SPRAY, METERED NASAL 2 TIMES DAILY
Qty: 30 ML | Refills: 11 | Status: SHIPPED | OUTPATIENT
Start: 2024-05-07

## 2024-05-13 ENCOUNTER — HOSPITAL ENCOUNTER (OUTPATIENT)
Facility: HOSPITAL | Age: 59
Discharge: HOME | End: 2024-05-13
Payer: COMMERCIAL

## 2024-05-13 DIAGNOSIS — N63.32 MASS OF AXILLARY TAIL OF LEFT BREAST: ICD-10-CM

## 2024-05-13 PROCEDURE — 76642 ULTRASOUND BREAST LIMITED: CPT

## 2024-05-13 PROCEDURE — 77065 DX MAMMO INCL CAD UNI: CPT | Performed by: RADIOLOGY

## 2024-05-13 PROCEDURE — G0279 TOMOSYNTHESIS, MAMMO: HCPCS

## 2024-05-13 PROCEDURE — 76642 ULTRASOUND BREAST LIMITED: CPT | Performed by: RADIOLOGY

## 2024-05-13 PROCEDURE — 77061 BREAST TOMOSYNTHESIS UNI: CPT | Performed by: RADIOLOGY

## 2024-05-13 PROCEDURE — 77065 DX MAMMO INCL CAD UNI: CPT

## 2024-05-20 ENCOUNTER — PRIOR AUTHORIZATION (OUTPATIENT)
Dept: FAMILY MEDICINE CLINIC | Facility: CLINIC | Age: 59
End: 2024-05-20
Payer: COMMERCIAL

## 2024-05-23 ENCOUNTER — PATIENT MESSAGE (OUTPATIENT)
Dept: FAMILY MEDICINE CLINIC | Facility: CLINIC | Age: 59
End: 2024-05-23

## 2024-05-23 DIAGNOSIS — G62.9 PERIPHERAL POLYNEUROPATHY: ICD-10-CM

## 2024-05-23 RX ORDER — ROPINIROLE 1 MG/1
1 TABLET, FILM COATED ORAL NIGHTLY
Qty: 90 TABLET | Refills: 0 | Status: SHIPPED | OUTPATIENT
Start: 2024-05-23

## 2024-05-23 RX ORDER — ROPINIROLE 0.5 MG/1
0.25 TABLET, FILM COATED ORAL NIGHTLY
Qty: 45 TABLET | Refills: 1 | Status: SHIPPED | OUTPATIENT
Start: 2024-05-23

## 2024-05-24 DIAGNOSIS — G62.9 PERIPHERAL POLYNEUROPATHY: ICD-10-CM

## 2024-05-24 DIAGNOSIS — G47.33 OSA (OBSTRUCTIVE SLEEP APNEA): ICD-10-CM

## 2024-05-24 RX ORDER — MODAFINIL 200 MG/1
200 TABLET ORAL DAILY
Qty: 90 TABLET | Refills: 0 | Status: SHIPPED | OUTPATIENT
Start: 2024-05-24

## 2024-05-24 RX ORDER — PREGABALIN 150 MG/1
150 CAPSULE ORAL 2 TIMES DAILY
Qty: 60 CAPSULE | Refills: 0 | Status: CANCELLED | OUTPATIENT
Start: 2024-05-24

## 2024-05-24 RX ORDER — PREGABALIN 150 MG/1
150 CAPSULE ORAL 2 TIMES DAILY
Qty: 60 CAPSULE | Refills: 0 | Status: SHIPPED | OUTPATIENT
Start: 2024-05-24

## 2024-06-24 ENCOUNTER — PATIENT MESSAGE (OUTPATIENT)
Dept: FAMILY MEDICINE CLINIC | Facility: CLINIC | Age: 59
End: 2024-06-24
Payer: COMMERCIAL

## 2024-06-25 DIAGNOSIS — G62.9 PERIPHERAL POLYNEUROPATHY: ICD-10-CM

## 2024-06-25 RX ORDER — PREGABALIN 150 MG/1
150 CAPSULE ORAL 2 TIMES DAILY
Qty: 180 CAPSULE | Refills: 0 | Status: SHIPPED | OUTPATIENT
Start: 2024-06-25

## 2024-07-22 ENCOUNTER — PRIOR AUTHORIZATION (OUTPATIENT)
Dept: FAMILY MEDICINE CLINIC | Facility: CLINIC | Age: 59
End: 2024-07-22
Payer: COMMERCIAL

## 2024-07-22 NOTE — TELEPHONE ENCOUNTER
Key: [id number]    buPROPion HCl ER (XL) 150MG er tablets    PA started    Outcome  Approved today  PA Case: [id number], Status: Approved, Coverage Starts on: 7/22/2024 12:00:00 AM, Coverage Ends on: 7/22/2025 12:00:00 AM.  Authorization Expiration Date: 7/21/2025

## 2024-07-26 ENCOUNTER — OFFICE VISIT (OUTPATIENT)
Dept: FAMILY MEDICINE CLINIC | Facility: CLINIC | Age: 59
End: 2024-07-26
Payer: COMMERCIAL

## 2024-07-26 VITALS
SYSTOLIC BLOOD PRESSURE: 182 MMHG | WEIGHT: 282 LBS | HEART RATE: 92 BPM | BODY MASS INDEX: 40.37 KG/M2 | DIASTOLIC BLOOD PRESSURE: 96 MMHG | RESPIRATION RATE: 14 BRPM | OXYGEN SATURATION: 98 % | HEIGHT: 70 IN

## 2024-07-26 DIAGNOSIS — Z51.81 THERAPEUTIC DRUG MONITORING: ICD-10-CM

## 2024-07-26 DIAGNOSIS — G62.9 PERIPHERAL POLYNEUROPATHY: ICD-10-CM

## 2024-07-26 DIAGNOSIS — G62.9 PERIPHERAL POLYNEUROPATHY: Primary | ICD-10-CM

## 2024-07-26 DIAGNOSIS — G47.33 OSA (OBSTRUCTIVE SLEEP APNEA): ICD-10-CM

## 2024-07-26 DIAGNOSIS — M25.473 ANKLE SWELLING, UNSPECIFIED LATERALITY: ICD-10-CM

## 2024-07-26 PROCEDURE — 99214 OFFICE O/P EST MOD 30 MIN: CPT | Performed by: FAMILY MEDICINE

## 2024-07-26 RX ORDER — PREGABALIN 150 MG/1
150 CAPSULE ORAL 2 TIMES DAILY
Qty: 180 CAPSULE | Refills: 1 | Status: SHIPPED | OUTPATIENT
Start: 2024-07-26

## 2024-07-26 RX ORDER — ROPINIROLE 0.5 MG/1
0.25 TABLET, FILM COATED ORAL NIGHTLY
Qty: 45 TABLET | Refills: 1 | Status: CANCELLED | OUTPATIENT
Start: 2024-07-26

## 2024-07-26 RX ORDER — MODAFINIL 200 MG/1
200 TABLET ORAL DAILY
Qty: 90 TABLET | Refills: 0 | Status: SHIPPED | OUTPATIENT
Start: 2024-07-26

## 2024-07-26 RX ORDER — ROPINIROLE 1 MG/1
2 TABLET, FILM COATED ORAL NIGHTLY
Qty: 180 TABLET | Refills: 0 | Status: SHIPPED | OUTPATIENT
Start: 2024-07-26

## 2024-07-26 NOTE — PROGRESS NOTES
Follow Up Office Visit      Patient Name: Zoë Dominguez  : 1965   MRN: [id number]     Chief Complaint:    Chief Complaint   Patient presents with    Peripheral Neuropathy       History of Present Illness: Zoë Dominguez is a 59 y.o. female who is here today to follow up with peripheral neuropathy, rls, and b/l ankle swelling. Ankles have been sprained many times. She has chronic edema of b/l malleoli they are both swollen with fluctuant mass noted laterally b/l. It hurts at times. Wears tight socks that help sometimes but can irritate too.     Neuropathic pain is controlled on lyrica.     Rls not controlled. On requip 1.5mg. has helped some but still has sx.     Physical exam: edema anterior to malleoli b/l laterally. Mood and affect appropriate.       Subjective        I have reviewed and the following portions of the patient's history were updated as appropriate: past family history, past medical history, past social history, past surgical history and problem list.    Medications:     Current Outpatient Medications:     amLODIPine (NORVASC) 10 MG tablet, Take 1 tablet by mouth Daily., Disp: 90 tablet, Rfl: 3    ASPIRIN 81 PO, Take  by mouth., Disp: , Rfl:     B Complex Vitamins (B COMPLEX VITAMIN PO), Take 1 tablet by mouth Daily., Disp: , Rfl:     buPROPion XL (Wellbutrin XL) 300 MG 24 hr tablet, Take 1 tablet by mouth Daily., Disp: 90 tablet, Rfl: 1    escitalopram (Lexapro) 10 MG tablet, Take 1 tablet by mouth Daily., Disp: 90 tablet, Rfl: 3    Fexofenadine HCl (ALLEGRA PO), Take  by mouth., Disp: , Rfl:     fluticasone (VERAMYST) 27.5 MCG/SPRAY nasal spray, 2 sprays into the nostril(s) as directed by provider Daily., Disp: , Rfl:     hydroCHLOROthiazide (HYDRODIURIL) 25 MG tablet, Take 1 tablet by mouth Daily., Disp: 90 tablet, Rfl: 3    lovastatin (MEVACOR) 40 MG tablet, Take 1 tablet by mouth Every Night., Disp: 90 tablet, Rfl: 3    modafinil (PROVIGIL) 200 MG tablet, Take 1  "tablet by mouth Daily. In morning, Disp: 90 tablet, Rfl: 0    multivitamin with minerals tablet tablet, Take 1 tablet by mouth Daily., Disp: , Rfl:     pregabalin (Lyrica) 150 MG capsule, Take 1 capsule by mouth 2 (Two) Times a Day., Disp: 180 capsule, Rfl: 1    rOPINIRole (REQUIP) 1 MG tablet, Take 2 tablets by mouth Every Night. Take 1 hour before bedtime., Disp: 180 tablet, Rfl: 0    tiZANidine (ZANAFLEX) 4 MG tablet, TAKE ONE TABLET BY MOUTH ONCE NIGHTLY AS NEEDED FOR MUSCLE SPASMS **MUST CALL MD FOR APPOINTMENT FOR FURTHER REFILLS, Disp: 14 tablet, Rfl: 0    valsartan (DIOVAN) 320 MG tablet, Take 1 tablet by mouth Daily., Disp: 90 tablet, Rfl: 3    Allergies:   Allergies   Allergen Reactions    Abilify [Aripiprazole] Other (See Comments)     tremors       Objective     Physical Exam: Please see above  Vital Signs:   Vitals:    07/26/24 1622   BP: (!) 182/96   BP Location: Left arm   Patient Position: Sitting   Cuff Size: Large Adult   Pulse: 92   Resp: 14   SpO2: 98%   Weight: 128 kg (282 lb)   Height: 177.8 cm (70\")     Body mass index is 40.46 kg/m².          Assessment / Plan      Assessment/Plan:   Diagnoses and all orders for this visit:    1. Peripheral polyneuropathy (Primary)  -     rOPINIRole (REQUIP) 1 MG tablet; Take 2 tablets by mouth Every Night. Take 1 hour before bedtime.  Dispense: 180 tablet; Refill: 0  -     pregabalin (Lyrica) 150 MG capsule; Take 1 capsule by mouth 2 (Two) Times a Day.  Dispense: 180 capsule; Refill: 1    2. CY (obstructive sleep apnea)  -     modafinil (PROVIGIL) 200 MG tablet; Take 1 tablet by mouth Daily. In morning  Dispense: 90 tablet; Refill: 0    3. Therapeutic drug monitoring    4. Ankle swelling, unspecified laterality  -     Cancel: Ambulatory Referral to Orthopedic Surgery    BP improved after rechecking.  Patient checks BP at home and says it is well-controlled.  No changes to care at this time    Restless leg uncontrolled-increase Requip to 2 mg and further up " to 4 mg as needed weekly.  Maximum dose 4 mg.  Patient to call back to let us know.    CY residual tiredness controlled on modafinil.  Patient taking responsibly.    Lyrica helping with neuropathy.  Continue current dose.    Patient with swelling both of her lateral malleoli.  Concerning for possible lipoma or fluctuant mass.  Is difficult to assess due to habitus.  But it definitely looks like a collection of fluid or mass, and has been there for years.  Recommend follow-up with orthopedics if it does not improve the compression socks.  Main reason for orthopedic referral is because I do not think x-ray would show anything, and ultrasound may be a waste of diagnostic resources if it does not show anything.  I would prefer orthopedics just to look at it to see if there is any treatment possible or any imaging is appropriate.    Today we reviewed and discussed the patient's controlled substance.  We reviewed their Bird report, previous drug screens, and drug contract.  They have a drug contract and drug screen on file that is current.  They are compliant with follow-ups every 3 months, and will continue to be compliant per the drug contract.     Follow Up:   Return in about 3 months (around 10/26/2024) for cy.    Mauricio Frey DO  [facility] [place] [facility] [place]

## 2024-07-29 DIAGNOSIS — G62.9 PERIPHERAL POLYNEUROPATHY: ICD-10-CM

## 2024-07-29 RX ORDER — ROPINIROLE 1 MG/1
2 TABLET, FILM COATED ORAL NIGHTLY
Qty: 180 TABLET | Refills: 0 | Status: CANCELLED | OUTPATIENT
Start: 2024-07-29

## 2024-10-09 DIAGNOSIS — F32.4 MAJOR DEPRESSIVE DISORDER WITH SINGLE EPISODE, IN PARTIAL REMISSION: ICD-10-CM

## 2024-10-09 RX ORDER — ESCITALOPRAM OXALATE 10 MG/1
10 TABLET ORAL DAILY
Qty: 90 TABLET | Refills: 0 | Status: SHIPPED | OUTPATIENT
Start: 2024-10-09

## 2024-10-16 NOTE — PROGRESS NOTES
Procedure   Large Joint Arthrocentesis: L subacromial bursa  Date/Time: 8/23/2022 3:23 PM  Consent given by: patient  Site marked: site marked  Timeout: Immediately prior to procedure a time out was called to verify the correct patient, procedure, equipment, support staff and site/side marked as required   Supporting Documentation  Indications: pain   Procedure Details  Location: shoulder - L subacromial bursa  Preparation: Patient was prepped and draped in the usual sterile fashion  Needle size: 22 G  Approach: posterior  Medications administered: 4 mL lidocaine PF 1% 1 %; 40 mg triamcinolone acetonide 40 MG/ML  Patient tolerance: patient tolerated the procedure well with no immediate complications            Detail Level: Generalized Products Recommended: Begin using physical sunscreen daily General Sunscreen Counseling: I recommended a broad spectrum sunscreen with a SPF of 30 or higher.  I explained that SPF 30 sunscreens block approximately 97 percent of the sun's harmful rays.  Sunscreens should be applied at least 15 minutes prior to expected sun exposure and then every 2 hours after that as long as sun exposure continues. If swimming or exercising sunscreen should be reapplied every 45 minutes to an hour after getting wet or sweating.  One ounce, or the equivalent of a shot glass full of sunscreen, is adequate to protect the skin not covered by a bathing suit. I also recommended a lip balm with a sunscreen as well. Sun protective clothing can be used in lieu of sunscreen but must be worn the entire time you are exposed to the sun's rays.

## 2024-10-24 ENCOUNTER — OFFICE VISIT (OUTPATIENT)
Dept: OBSTETRICS AND GYNECOLOGY | Facility: CLINIC | Age: 59
End: 2024-10-24
Payer: COMMERCIAL

## 2024-10-24 VITALS
BODY MASS INDEX: 39.17 KG/M2 | WEIGHT: 273.6 LBS | DIASTOLIC BLOOD PRESSURE: 78 MMHG | SYSTOLIC BLOOD PRESSURE: 132 MMHG | HEIGHT: 70 IN

## 2024-10-24 DIAGNOSIS — R30.0 DYSURIA: ICD-10-CM

## 2024-10-24 DIAGNOSIS — D22.9 NUMEROUS SKIN MOLES: ICD-10-CM

## 2024-10-24 DIAGNOSIS — Z12.31 SCREENING MAMMOGRAM FOR BREAST CANCER: ICD-10-CM

## 2024-10-24 DIAGNOSIS — Z01.419 WELL WOMAN EXAM WITH ROUTINE GYNECOLOGICAL EXAM: Primary | ICD-10-CM

## 2024-10-24 LAB
BILIRUB UR QL STRIP: NEGATIVE
CLARITY UR: ABNORMAL
COLOR UR: YELLOW
GLUCOSE UR STRIP-MCNC: NEGATIVE MG/DL
HGB UR QL STRIP.AUTO: ABNORMAL
HOLD SPECIMEN: NORMAL
KETONES UR QL STRIP: NEGATIVE
LEUKOCYTE ESTERASE UR QL STRIP.AUTO: ABNORMAL
NITRITE UR QL STRIP: NEGATIVE
PH UR STRIP.AUTO: 7 [PH] (ref 5–8)
PROT UR QL STRIP: ABNORMAL
SP GR UR STRIP: 1.02 (ref 1–1.03)
UROBILINOGEN UR QL STRIP: ABNORMAL

## 2024-10-24 PROCEDURE — 81001 URINALYSIS AUTO W/SCOPE: CPT | Performed by: NURSE PRACTITIONER

## 2024-10-24 PROCEDURE — 87086 URINE CULTURE/COLONY COUNT: CPT | Performed by: NURSE PRACTITIONER

## 2024-10-24 PROCEDURE — 87186 SC STD MICRODIL/AGAR DIL: CPT | Performed by: NURSE PRACTITIONER

## 2024-10-24 PROCEDURE — 87088 URINE BACTERIA CULTURE: CPT | Performed by: NURSE PRACTITIONER

## 2024-10-24 RX ORDER — SULFAMETHOXAZOLE/TRIMETHOPRIM 800-160 MG
1 TABLET ORAL 2 TIMES DAILY
Qty: 6 TABLET | Refills: 0 | Status: SHIPPED | OUTPATIENT
Start: 2024-10-24

## 2024-10-24 RX ORDER — FLUCONAZOLE 150 MG/1
150 TABLET ORAL DAILY
Qty: 1 TABLET | Refills: 0 | Status: SHIPPED | OUTPATIENT
Start: 2024-10-24

## 2024-10-24 NOTE — PROGRESS NOTES
Subjective   Chief Complaint   Patient presents with    Annual Exam     Well woman exam     Zoë Dominguez is a 59 y.o. year old  menopausal female presenting to be seen for her annual exam.  This past year she has not been on hormone replacement therapy.  There has not been vaginal bleeding in the last 12 months.  Menopausal symptoms are present (hot flashes and night sweats) but not affecting her quality of life . Her last pap was 10/2023 with normal cytology and negative hpv cotesting. She reports for the last 3-4 days she has had urinary frequency, dysuria, urgency and dribbling of urine. She has been using cystex otc with minimal improvement in symptoms. She reports her symptoms first started after soaking in a bathtub. She was originally concerned for possible yeast infection but then started having more urinary symptoms.     SEXUAL Hx:  She is not currently sexually active.  In the past year there there has been NO new sexual partners.    Condoms are never used.  She would not like to be screened for STD's at today's exam.  [employer] is painful: no  HEALTH Hx:  She exercises regularly: no (but is planning to start exercising more).  She wears her seat belt: yes.  She has concerns about domestic violence: no.  She has noticed changes in height: no.  OTHER THINGS SHE WANTS TO DISCUSS TODAY:  Nothing else    The following portions of the patient's history were reviewed and updated as appropriate:problem list, current medications, allergies, past family history, past medical history, past social history, and past surgical history.    Social History    Tobacco Use      Smoking status: Never      Smokeless tobacco: Never      Review of Systems  Constitutional POS: nothing reported    NEG: anorexia or night sweats   Genitourinary POS: dysuria, frequency, hesitancy, and urgency    NEG: hematuria      Gastointestinal POS: nothing reported    NEG: bloating, change in bowel habits, melena, or reflux  "symptoms   Integument POS: nothing reported    NEG: moles that are changing in size, shape, color or rashes   Breast POS: nothing reported    NEG: persistent breast lump, skin dimpling, or nipple discharge        Objective   /78 (BP Location: Right arm, Patient Position: Sitting, Cuff Size: Adult)   Ht 177.8 cm (70\")   Wt 124 kg (273 lb 9.6 oz)   LMP  (LMP Unknown)   BMI 39.26 kg/m²     General:  well developed; well nourished  no acute distress  mentation appropriate  obese - Body mass index is 39.26 kg/m².   Skin:  No suspicious lesions seen  Scattered moles and skin changes on arms, legs and trunk   Thyroid: normal to inspection and palpation   Breasts:  Examined in supine position  Symmetric without masses or skin dimpling  Nipples normal without inversion, lesions or discharge  There are no palpable axillary nodes   Abdomen: soft, non-tender; no masses  no umbilical or inguinal hernias are present  no hepato-splenomegaly   Pelvis: Clinical staff was present for exam  External genitalia:  normal appearance of the external genitalia including Bartholin's and Secaucus's glands.  :  urethral meatus normal;  Vaginal:  atrophic mucosal changes are present;  Cervix:  normal appearance.  Uterus:  normal size, shape and consistency.  Adnexa:  non palpable bilaterally.  Rectal:  digital rectal exam not performed; anus visually normal appearing.  Exam limited by patient body habitus        Assessment   Well woman with routine gyencological exam  Dysuria  Urinary frequency and urgency  She is up to date on all relevant gynecologic and colorectal screenings- due for mammogram this month, she is going to check her colonoscopy records but thinks she had in 2018.   Numerous moles        Plan   Pap was done today.  If she does not receive the results of the Pap within 2 weeks  time, she was instructed to call to find out the results.  I explained to Zoë that the recommendations for Pap smear interval in a low risk " patient's has lengthened to 3 years time.  I encouraged her to be seen yearly for a full physical exam including breast and pelvic exam even during the off years when PAP's will not be performed.  She was encouraged to get yearly mammograms.  She should report any palpable breast lump(s) or skin changes regardless of mammographic findings.  I explained to Zoë that notification regarding her mammogram results will come from the center performing the study.  Our office will not be routinely calling with mammogram results.  It is her responsibility to make sure that the results from the mammogram are communicated to her by the breast center.  If she has any questions about the results, she is welcome to call our office anytime.  Colonoscopy was recommended for screening for colon cancer.  The procedure was briefly discussed and its benefits for early detection of colon cancer were emphasized.  I explained to Zoë that we could help her to schedule it if she wishes.  Additionally, she could also contact her primary care physician to help make this arrangement.  After considering these options she  will review her records and notify provider if she needs order for gi (last colonoscopy in [place], she thinks 2018) .  Urine sent for ua, rx for bactrim ds, diflucan sent as well - has follow up with pcp next week  Referral sent to dermatology for skin check   The importance of keeping all planned follow-up and taking all medications as prescribed was emphasized.  Today I discussed with Zoë the total recommended calcium intake for a post-menopausal female is 1200 mg.  Ideally this should be from dietary sources.  I reviewed calcium content in various foods including milk, fortified orange juice and yogurt.  If she cannot get sufficient calcium through dietary means, it is recommended to supplement with either a multivitamin or calcium to reach her daily goal.  I also reviewed the difference in the bioavailability of  calcium carbonate and calcium citrate containing supplements and the importance of taking calcium carbonate containing products with food. Finally, vitamin D's role in calcium absorption was reviewed and a total daily vitamin D intake of 600 units was recommended.  Her vaccine record was reviewed and updated.  Follow up for annual exam 1 year    No orders of the defined types were placed in this encounter.         This note was electronically signed.  Meghann Adler, LANDON  October 24, 2024

## 2024-10-25 LAB
BACTERIA UR QL AUTO: ABNORMAL /HPF
HYALINE CASTS UR QL AUTO: ABNORMAL /LPF
RBC # UR STRIP: ABNORMAL /HPF
REF LAB TEST METHOD: ABNORMAL
SQUAMOUS #/AREA URNS HPF: ABNORMAL /HPF
WBC # UR STRIP: ABNORMAL /HPF

## 2024-10-27 LAB — BACTERIA SPEC AEROBE CULT: ABNORMAL

## 2024-10-30 ENCOUNTER — OFFICE VISIT (OUTPATIENT)
Dept: FAMILY MEDICINE CLINIC | Facility: CLINIC | Age: 59
End: 2024-10-30
Payer: COMMERCIAL

## 2024-10-30 VITALS
DIASTOLIC BLOOD PRESSURE: 72 MMHG | SYSTOLIC BLOOD PRESSURE: 126 MMHG | WEIGHT: 275 LBS | HEIGHT: 70 IN | HEART RATE: 76 BPM | OXYGEN SATURATION: 98 % | TEMPERATURE: 97.6 F | BODY MASS INDEX: 39.37 KG/M2

## 2024-10-30 DIAGNOSIS — F32.A ANXIETY AND DEPRESSION: ICD-10-CM

## 2024-10-30 DIAGNOSIS — F41.9 ANXIETY AND DEPRESSION: ICD-10-CM

## 2024-10-30 DIAGNOSIS — R87.619 ENDOMETRIAL CELLS ON CERVICAL PAP SMEAR INCONSISTENT W/LMP: Primary | ICD-10-CM

## 2024-10-30 DIAGNOSIS — G25.81 RESTLESS LEG SYNDROME: ICD-10-CM

## 2024-10-30 DIAGNOSIS — G47.33 OSA (OBSTRUCTIVE SLEEP APNEA): Primary | ICD-10-CM

## 2024-10-30 DIAGNOSIS — F32.4 MAJOR DEPRESSIVE DISORDER WITH SINGLE EPISODE, IN PARTIAL REMISSION: ICD-10-CM

## 2024-10-30 LAB — REF LAB TEST METHOD: NORMAL

## 2024-10-30 PROCEDURE — 99214 OFFICE O/P EST MOD 30 MIN: CPT | Performed by: FAMILY MEDICINE

## 2024-10-30 RX ORDER — ESCITALOPRAM OXALATE 10 MG/1
10 TABLET ORAL DAILY
Qty: 90 TABLET | Refills: 3 | Status: SHIPPED | OUTPATIENT
Start: 2024-10-30

## 2024-10-30 RX ORDER — ROPINIROLE 4 MG/1
4 TABLET, FILM COATED ORAL NIGHTLY
Qty: 90 TABLET | Refills: 1 | Status: SHIPPED | OUTPATIENT
Start: 2024-10-30

## 2024-10-30 RX ORDER — BUPROPION HYDROCHLORIDE 300 MG/1
300 TABLET ORAL DAILY
Qty: 90 TABLET | Refills: 3 | Status: SHIPPED | OUTPATIENT
Start: 2024-10-30

## 2024-10-30 RX ORDER — DEXTROAMPHETAMINE SACCHARATE, AMPHETAMINE ASPARTATE, DEXTROAMPHETAMINE SULFATE AND AMPHETAMINE SULFATE 2.5; 2.5; 2.5; 2.5 MG/1; MG/1; MG/1; MG/1
10 TABLET ORAL 3 TIMES DAILY
Qty: 90 TABLET | Refills: 0 | Status: SHIPPED | OUTPATIENT
Start: 2024-10-30

## 2024-10-30 NOTE — PROGRESS NOTES
Follow Up Office Visit      Patient Name: Zoë Dominguez  : 1965   MRN: [id number]     Chief Complaint:    Chief Complaint   Patient presents with    Insomnia     Follow-up       History of Present Illness: Zoë Dominguez is a 59 y.o. female who is here today to follow up with daytime sleepiness that is persistent.  Patient says modafinil is unable to be obtained at some times.  Patient has been off of it for 1 week and she is just very very tired.  Has CY and that is why she is on modafinil.  Patient has not tried Adderall.  She currently reports that she is taking 4 mg of Requip and that helps with her symptoms.  Takes that for restless leg syndrome.  Patient reports that she is doing well on Lexapro and Wellbutrin and needs refills.  Anxiety and depression stable      Physical exam: Patient's mood and affect was appropriate      Subjective        I have reviewed and the following portions of the patient's history were updated as appropriate: past family history, past medical history, past social history, past surgical history and problem list.    Medications:     Current Outpatient Medications:     amLODIPine (NORVASC) 10 MG tablet, Take 1 tablet by mouth Daily., Disp: 90 tablet, Rfl: 3    ASPIRIN 81 PO, Take  by mouth., Disp: , Rfl:     B Complex Vitamins (B COMPLEX VITAMIN PO), Take 1 tablet by mouth Daily., Disp: , Rfl:     buPROPion XL (Wellbutrin XL) 300 MG 24 hr tablet, Take 1 tablet by mouth Daily., Disp: 90 tablet, Rfl: 3    escitalopram (Lexapro) 10 MG tablet, Take 1 tablet by mouth Daily., Disp: 90 tablet, Rfl: 3    Fexofenadine HCl (ALLEGRA PO), Take  by mouth., Disp: , Rfl:     fluconazole (Diflucan) 150 MG tablet, Take 1 tablet by mouth Daily., Disp: 1 tablet, Rfl: 0    fluticasone (VERAMYST) 27.5 MCG/SPRAY nasal spray, Administer 2 sprays into the nostril(s) as directed by provider Daily., Disp: , Rfl:     hydroCHLOROthiazide (HYDRODIURIL) 25 MG tablet, Take 1 tablet by  "mouth Daily., Disp: 90 tablet, Rfl: 3    lovastatin (MEVACOR) 40 MG tablet, Take 1 tablet by mouth Every Night., Disp: 90 tablet, Rfl: 3    multivitamin with minerals tablet tablet, Take 1 tablet by mouth Daily., Disp: , Rfl:     pregabalin (Lyrica) 150 MG capsule, Take 1 capsule by mouth 2 (Two) Times a Day., Disp: 180 capsule, Rfl: 1    sulfamethoxazole-trimethoprim (Bactrim DS) 800-160 MG per tablet, Take 1 tablet by mouth 2 (Two) Times a Day., Disp: 6 tablet, Rfl: 0    valsartan (DIOVAN) 320 MG tablet, Take 1 tablet by mouth Daily., Disp: 90 tablet, Rfl: 3    amphetamine-dextroamphetamine (Adderall) 10 MG tablet, Take 1 tablet by mouth 3 (Three) Times a Day., Disp: 90 tablet, Rfl: 0    modafinil (PROVIGIL) 200 MG tablet, Take 1 tablet by mouth Daily. In morning (Patient not taking: Reported on 10/30/2024), Disp: 90 tablet, Rfl: 0    rOPINIRole (REQUIP) 4 MG tablet, Take 1 tablet by mouth Every Night., Disp: 90 tablet, Rfl: 1    Allergies:   Allergies   Allergen Reactions    Abilify [Aripiprazole] Other (See Comments)     tremors       Objective     Physical Exam: Please see above  Vital Signs:   Vitals:    10/30/24 1640   BP: 126/72   Pulse: 76   Temp: 97.6 °F (36.4 °C)   TempSrc: Infrared   SpO2: 98%   Weight: 125 kg (275 lb)   Height: 177.8 cm (70\")   PainSc:   7     Body mass index is 39.46 kg/m².          Assessment / Plan      Assessment/Plan:   Diagnoses and all orders for this visit:    1. CY (obstructive sleep apnea) (Primary)  -     amphetamine-dextroamphetamine (Adderall) 10 MG tablet; Take 1 tablet by mouth 3 (Three) Times a Day.  Dispense: 90 tablet; Refill: 0    2. Restless leg syndrome  -     rOPINIRole (REQUIP) 4 MG tablet; Take 1 tablet by mouth Every Night.  Dispense: 90 tablet; Refill: 1    3. Anxiety and depression  -     buPROPion XL (Wellbutrin XL) 300 MG 24 hr tablet; Take 1 tablet by mouth Daily.  Dispense: 90 tablet; Refill: 3    4. Major depressive disorder with single episode, in " partial remission  -     escitalopram (Lexapro) 10 MG tablet; Take 1 tablet by mouth Daily.  Dispense: 90 tablet; Refill: 3    Continue current treatment for chronic diseases.  We did switch patient from modafinil to Adderall for her CY and daytime sleepiness-this was because the medication is not obtainable.  Patient will continue with Adderall until modafinil becomes available.  She can call to switch of medication as needed    This is a new Adderall dose-show she may need follow-up in 4 weeks for reassessment if that is not working.  She can take 110 mg tablet 3 times a day or she can take 15 mg twice a day-for total of 30 mg daily.    Today we reviewed and discussed the patient's controlled substance.  We reviewed their [place] report, previous drug screens, and drug contract.  They have a drug contract and drug screen on file that is current.  They are compliant with follow-ups every 3 months, and will continue to be compliant per the drug contract.     Follow Up:   Return in about 3 months (around 1/30/2025) for Annual.    Mauricio Frey DO  [facility] [place]

## 2024-11-18 NOTE — PROGRESS NOTES
"Subjective   Chief Complaint   Patient presents with    Follow-up     Follow up after US     Zoë Dominguez is a 59 y.o. year old .  No LMP recorded (lmp unknown). Patient has had an ablation.  She presents to be seen for further evaluation of endometrial cells noted on recent pap. She is s/p uterine ablation. Known history of uterine fibroids s/p myomectomy. She denies any vaginal bleeding.     The following portions of the patient's history were reviewed and updated as appropriate:current medications and allergies    Social History    Tobacco Use      Smoking status: Never      Smokeless tobacco: Never         Objective   /70 (BP Location: Left arm, Patient Position: Sitting, Cuff Size: Adult)   Ht 177.8 cm (70\")   Wt 120 kg (264 lb)   LMP  (LMP Unknown)   BMI 37.88 kg/m²     Lab Review   No data reviewed    Imaging   Pelvic ultrasound report   Tvus in office today shows endometrial lining 1.8mm   Bilateral simple ovarian cysts noted  2 intramural fibroids noted      Assessment   Endometrial cells noted on pap   No postmenopausal bleeding  S/p uterine ablation      Plan   Reviewed ultrasound results with patient. Discussed endometrial lining is thin, however this could be effected by previous ablation. If she has any vaginal bleeding would recommend endometrial sampling. If unable to obtain endometrial biopsy in office discussed hysteroscopy. Discussed patient with Dr Kraus who agrees with plan of care. Will notify patient of any changes to ultrasound report after official read complete  The importance of keeping all planned follow-up and taking all medications as prescribed was emphasized.  Rtc for annual or prn       No orders of the defined types were placed in this encounter.               This note was electronically signed.  Meghann Adler CNM  2024    "

## 2024-11-19 ENCOUNTER — OFFICE VISIT (OUTPATIENT)
Dept: OBSTETRICS AND GYNECOLOGY | Facility: CLINIC | Age: 59
End: 2024-11-19
Payer: COMMERCIAL

## 2024-11-19 VITALS
WEIGHT: 264 LBS | SYSTOLIC BLOOD PRESSURE: 130 MMHG | HEIGHT: 70 IN | DIASTOLIC BLOOD PRESSURE: 70 MMHG | BODY MASS INDEX: 37.8 KG/M2

## 2024-11-19 DIAGNOSIS — R87.619 ENDOMETRIAL CELLS ON CERVICAL PAP SMEAR INCONSISTENT W/LMP: Primary | ICD-10-CM

## 2024-11-26 ENCOUNTER — TELEPHONE (OUTPATIENT)
Dept: FAMILY MEDICINE CLINIC | Facility: CLINIC | Age: 59
End: 2024-11-26
Payer: COMMERCIAL

## 2024-11-26 DIAGNOSIS — I10 ESSENTIAL HYPERTENSION: ICD-10-CM

## 2024-11-26 RX ORDER — HYDROCHLOROTHIAZIDE 25 MG/1
25 TABLET ORAL DAILY
Qty: 90 TABLET | Refills: 3 | Status: CANCELLED | OUTPATIENT
Start: 2024-11-26

## 2024-11-26 RX ORDER — AMLODIPINE BESYLATE 10 MG/1
10 TABLET ORAL DAILY
Qty: 90 TABLET | Refills: 3 | Status: SHIPPED | OUTPATIENT
Start: 2024-11-26

## 2024-11-26 RX ORDER — HYDROCHLOROTHIAZIDE 25 MG/1
25 TABLET ORAL DAILY
Qty: 90 TABLET | Refills: 3 | Status: SHIPPED | OUTPATIENT
Start: 2024-11-26

## 2024-11-26 RX ORDER — VALSARTAN 320 MG/1
320 TABLET ORAL DAILY
Qty: 90 TABLET | Refills: 3 | Status: SHIPPED | OUTPATIENT
Start: 2024-11-26

## 2024-11-26 RX ORDER — AMLODIPINE BESYLATE 10 MG/1
10 TABLET ORAL DAILY
Qty: 90 TABLET | Refills: 3 | Status: CANCELLED | OUTPATIENT
Start: 2024-11-26

## 2024-11-26 RX ORDER — VALSARTAN 320 MG/1
320 TABLET ORAL DAILY
Qty: 90 TABLET | Refills: 3 | Status: CANCELLED | OUTPATIENT
Start: 2024-11-26

## 2024-11-26 NOTE — TELEPHONE ENCOUNTER
Dr. Frey not here today, so I have sent in all of her blood pressure medications to her pharmacy.

## 2024-11-26 NOTE — TELEPHONE ENCOUNTER
Patient is completely out of blood pressure medication. Has been out since last night.   She is very upset that it was not sent in on 10/30/2024 when she was in the office for a visit.  Today blood pressure is running 178/84, states that it was even higher this morning.

## 2024-11-26 NOTE — TELEPHONE ENCOUNTER
Caller: Zoë Dominguez    Relationship: Self    Best call back number:       [phone] (Mobile)     Requested Prescriptions:   Requested Prescriptions     Pending Prescriptions Disp Refills    valsartan (DIOVAN) 320 MG tablet 90 tablet 3     Sig: Take 1 tablet by mouth Daily.    hydroCHLOROthiazide 25 MG tablet 90 tablet 3     Sig: Take 1 tablet by mouth Daily.    amLODIPine (NORVASC) 10 MG tablet 90 tablet 3     Sig: Take 1 tablet by mouth Daily.      Pharmacy where request should be sent:     [address] [phone] [facility] [phone] FX     Last office visit with prescribing clinician: 10/30/2024   Last telemedicine visit with prescribing clinician: Visit date not found   Next office visit with prescribing clinician: 1/31/2025     Additional details provided by patient:     PATIENT STATED SHE IS OUT OF THE MEDICATIONS INCLUDED ABOVE    PATIENT ALSO REQUESTED DR MURDOCK CHECK IF OTHER MEDICATIONS NEED TO BE REFILLED AS WELL    Does the patient have less than a 3 day supply:  [x] Yes  [] No    Would you like a call back once the refill request has been completed: [] Yes [] No    If the office needs to give you a call back, can they leave a voicemail: [] Yes [] No    Tripp Fenton Rep   11/26/24 15:39 EST

## 2024-12-11 ENCOUNTER — PATIENT MESSAGE (OUTPATIENT)
Dept: FAMILY MEDICINE CLINIC | Facility: CLINIC | Age: 59
End: 2024-12-11
Payer: COMMERCIAL

## 2024-12-11 DIAGNOSIS — G47.33 OSA (OBSTRUCTIVE SLEEP APNEA): ICD-10-CM

## 2024-12-11 RX ORDER — DEXTROAMPHETAMINE SACCHARATE, AMPHETAMINE ASPARTATE, DEXTROAMPHETAMINE SULFATE AND AMPHETAMINE SULFATE 2.5; 2.5; 2.5; 2.5 MG/1; MG/1; MG/1; MG/1
10 TABLET ORAL 3 TIMES DAILY
Qty: 90 TABLET | Refills: 0 | Status: SHIPPED | OUTPATIENT
Start: 2024-12-11

## 2024-12-17 ENCOUNTER — TELEMEDICINE (OUTPATIENT)
Dept: FAMILY MEDICINE CLINIC | Facility: CLINIC | Age: 59
End: 2024-12-17
Payer: COMMERCIAL

## 2024-12-17 DIAGNOSIS — G47.33 OSA (OBSTRUCTIVE SLEEP APNEA): Primary | ICD-10-CM

## 2024-12-17 PROCEDURE — 99213 OFFICE O/P EST LOW 20 MIN: CPT | Performed by: FAMILY MEDICINE

## 2024-12-17 NOTE — PROGRESS NOTES
Follow Up Office Visit      Patient Name: Zoë Dominguez  : 1965   MRN: [id number]     Chief Complaint:    Chief Complaint   Patient presents with    Sleep Apnea       History of Present Illness: Zoë Dominguez is a 59 y.o. female who is here today to follow up with sleep apnea.  Been on modafinil long-term and always noted that it did not really help her sleepiness.    She reports Adderall has been working well.    She thinks that this is working better than modafinil.  No side effects from treatment.    Physical exam: Did not perform due to video visit      Patient was located at her home in [place] for visit.  Provider was located at work in [place].  Video and audio was used.  No technical issues occurred.  Patient confirmed and signed consent form to have visit.        Subjective        I have reviewed and the following portions of the patient's history were updated as appropriate: past family history, past medical history, past social history, past surgical history and problem list.    Medications:     Current Outpatient Medications:     amLODIPine (NORVASC) 10 MG tablet, Take 1 tablet by mouth Daily., Disp: 90 tablet, Rfl: 3    amphetamine-dextroamphetamine (Adderall) 10 MG tablet, Take 1 tablet by mouth 3 (Three) Times a Day., Disp: 90 tablet, Rfl: 0    ASPIRIN 81 PO, Take  by mouth., Disp: , Rfl:     B Complex Vitamins (B COMPLEX VITAMIN PO), Take 1 tablet by mouth Daily., Disp: , Rfl:     buPROPion XL (Wellbutrin XL) 300 MG 24 hr tablet, Take 1 tablet by mouth Daily., Disp: 90 tablet, Rfl: 3    escitalopram (Lexapro) 10 MG tablet, Take 1 tablet by mouth Daily., Disp: 90 tablet, Rfl: 3    Fexofenadine HCl (ALLEGRA PO), Take  by mouth., Disp: , Rfl:     fluticasone (VERAMYST) 27.5 MCG/SPRAY nasal spray, Administer 2 sprays into the nostril(s) as directed by provider Daily., Disp: , Rfl:     hydroCHLOROthiazide 25 MG tablet, Take 1 tablet by mouth Daily., Disp: 90 tablet, Rfl: 3     lovastatin (MEVACOR) 40 MG tablet, Take 1 tablet by mouth Every Night., Disp: 90 tablet, Rfl: 3    multivitamin with minerals tablet tablet, Take 1 tablet by mouth Daily., Disp: , Rfl:     pregabalin (Lyrica) 150 MG capsule, Take 1 capsule by mouth 2 (Two) Times a Day., Disp: 180 capsule, Rfl: 1    rOPINIRole (REQUIP) 4 MG tablet, Take 1 tablet by mouth Every Night., Disp: 90 tablet, Rfl: 1    valsartan (DIOVAN) 320 MG tablet, Take 1 tablet by mouth Daily., Disp: 90 tablet, Rfl: 3    Allergies:   Allergies   Allergen Reactions    Abilify [Aripiprazole] Other (See Comments)     tremors       Objective     Physical Exam: Please see above  Vital Signs: There were no vitals filed for this visit.  There is no height or weight on file to calculate BMI.          Assessment / Plan      Assessment/Plan:   Diagnoses and all orders for this visit:    1. CY (obstructive sleep apnea) (Primary)    Treatment is going well.  Patient reported improved symptoms and no side effects.  Follow-up at next scheduled visit.    Follow Up:   Return if symptoms worsen or fail to improve, for cy.    Mauricio Frey DO  [facility] [place]

## 2025-01-31 ENCOUNTER — OFFICE VISIT (OUTPATIENT)
Dept: FAMILY MEDICINE CLINIC | Facility: CLINIC | Age: 60
End: 2025-01-31
Payer: COMMERCIAL

## 2025-01-31 ENCOUNTER — LAB (OUTPATIENT)
Dept: LAB | Facility: HOSPITAL | Age: 60
End: 2025-01-31
Payer: COMMERCIAL

## 2025-01-31 VITALS
HEART RATE: 80 BPM | OXYGEN SATURATION: 98 % | DIASTOLIC BLOOD PRESSURE: 70 MMHG | BODY MASS INDEX: 36.94 KG/M2 | HEIGHT: 70 IN | WEIGHT: 258 LBS | SYSTOLIC BLOOD PRESSURE: 116 MMHG | TEMPERATURE: 98.6 F

## 2025-01-31 DIAGNOSIS — G62.9 PERIPHERAL POLYNEUROPATHY: ICD-10-CM

## 2025-01-31 DIAGNOSIS — Z00.00 ANNUAL PHYSICAL EXAM: Primary | ICD-10-CM

## 2025-01-31 DIAGNOSIS — R01.1 MURMUR: ICD-10-CM

## 2025-01-31 DIAGNOSIS — I10 ESSENTIAL HYPERTENSION: ICD-10-CM

## 2025-01-31 DIAGNOSIS — G47.33 OSA (OBSTRUCTIVE SLEEP APNEA): ICD-10-CM

## 2025-01-31 DIAGNOSIS — R73.03 PREDIABETES: ICD-10-CM

## 2025-01-31 DIAGNOSIS — E78.5 HYPERLIPIDEMIA, UNSPECIFIED HYPERLIPIDEMIA TYPE: ICD-10-CM

## 2025-01-31 DIAGNOSIS — Z51.81 THERAPEUTIC DRUG MONITORING: ICD-10-CM

## 2025-01-31 LAB
ALBUMIN SERPL-MCNC: 4.1 G/DL (ref 3.5–5.2)
ALBUMIN/CREATININE RATIO, URINE: <30
ALBUMIN/GLOB SERPL: 1.2 G/DL
ALP SERPL-CCNC: 107 U/L (ref 39–117)
ALT SERPL W P-5'-P-CCNC: 20 U/L (ref 1–33)
ANION GAP SERPL CALCULATED.3IONS-SCNC: 10.8 MMOL/L (ref 5–15)
AST SERPL-CCNC: 25 U/L (ref 1–32)
BILIRUB SERPL-MCNC: 0.4 MG/DL (ref 0–1.2)
BUN SERPL-MCNC: 13 MG/DL (ref 6–20)
BUN/CREAT SERPL: 11.4 (ref 7–25)
CALCIUM SPEC-SCNC: 9.6 MG/DL (ref 8.6–10.5)
CHLORIDE SERPL-SCNC: 102 MMOL/L (ref 98–107)
CHOLEST SERPL-MCNC: 179 MG/DL (ref 0–200)
CO2 SERPL-SCNC: 31.2 MMOL/L (ref 22–29)
CREAT SERPL-MCNC: 1.14 MG/DL (ref 0.57–1)
EGFRCR SERPLBLD CKD-EPI 2021: 55.9 ML/MIN/1.73
EXPIRATION DATE: NORMAL
GLOBULIN UR ELPH-MCNC: 3.5 GM/DL
GLUCOSE SERPL-MCNC: 106 MG/DL (ref 65–99)
HBA1C MFR BLD: 5.3 % (ref 4.8–5.6)
HDLC SERPL-MCNC: 37 MG/DL (ref 40–60)
LDLC SERPL CALC-MCNC: 122 MG/DL (ref 0–100)
LDLC/HDLC SERPL: 3.23 {RATIO}
Lab: NORMAL
POC CREATININE URINE: 200
POC MICROALBUMIN URINE: 80
POTASSIUM SERPL-SCNC: 3 MMOL/L (ref 3.5–5.2)
PROT SERPL-MCNC: 7.6 G/DL (ref 6–8.5)
SODIUM SERPL-SCNC: 144 MMOL/L (ref 136–145)
TRIGL SERPL-MCNC: 112 MG/DL (ref 0–150)
TSH SERPL DL<=0.05 MIU/L-ACNC: 1.45 UIU/ML (ref 0.27–4.2)
VLDLC SERPL-MCNC: 20 MG/DL (ref 5–40)

## 2025-01-31 PROCEDURE — 82044 UR ALBUMIN SEMIQUANTITATIVE: CPT | Performed by: FAMILY MEDICINE

## 2025-01-31 PROCEDURE — 80061 LIPID PANEL: CPT

## 2025-01-31 PROCEDURE — 83036 HEMOGLOBIN GLYCOSYLATED A1C: CPT

## 2025-01-31 PROCEDURE — 80050 GENERAL HEALTH PANEL: CPT

## 2025-01-31 PROCEDURE — 99396 PREV VISIT EST AGE 40-64: CPT | Performed by: FAMILY MEDICINE

## 2025-01-31 RX ORDER — PREGABALIN 150 MG/1
150 CAPSULE ORAL 2 TIMES DAILY
Qty: 180 CAPSULE | Refills: 1 | Status: SHIPPED | OUTPATIENT
Start: 2025-01-31

## 2025-01-31 RX ORDER — DEXTROAMPHETAMINE SACCHARATE, AMPHETAMINE ASPARTATE, DEXTROAMPHETAMINE SULFATE AND AMPHETAMINE SULFATE 2.5; 2.5; 2.5; 2.5 MG/1; MG/1; MG/1; MG/1
10 TABLET ORAL 3 TIMES DAILY
Qty: 90 TABLET | Refills: 0 | Status: SHIPPED | OUTPATIENT
Start: 2025-01-31

## 2025-01-31 NOTE — PROGRESS NOTES
Follow Up Office Visit      Patient Name: Zoë Dominguez  : 1965   MRN: [id number]     Chief Complaint:    Chief Complaint   Patient presents with    Annual Exam       History of Present Illness: Zoë Dominguez is a 59 y.o. female who is here today to follow up with CY and neuropathy-she is on medications for these issues.  These controlled medications and symptoms are currently stable.  Patient's chronic diseases are stable including hypertension, CY, and neuropathy.    The patient does have a murmur on exam today we discussed getting echo    Patient is here for annual exam so we discussed diet, dental care, exercise.  Encouraged patient to exercise regularly and to eat a healthy diet with controlled portion sizes.  Encouraged her to stay updated with dental care.  Patient is up-to-date with mammogram and colon cancer screening.      Physical exam: Patient's heart exam shows grade 2-3  murmur in pulmonic area.  Regular rhythm and rate.  Lungs CTA bilateral.  Thyroid midline normal.  Neck supple.  Mood and affect appropriate      Subjective        I have reviewed and the following portions of the patient's history were updated as appropriate: past family history, past medical history, past social history, past surgical history and problem list.    Medications:     Current Outpatient Medications:     amLODIPine (NORVASC) 10 MG tablet, Take 1 tablet by mouth Daily., Disp: 90 tablet, Rfl: 3    amphetamine-dextroamphetamine (Adderall) 10 MG tablet, Take 1 tablet by mouth 3 (Three) Times a Day., Disp: 90 tablet, Rfl: 0    ASPIRIN 81 PO, Take  by mouth., Disp: , Rfl:     buPROPion XL (Wellbutrin XL) 300 MG 24 hr tablet, Take 1 tablet by mouth Daily., Disp: 90 tablet, Rfl: 3    escitalopram (Lexapro) 10 MG tablet, Take 1 tablet by mouth Daily., Disp: 90 tablet, Rfl: 3    Fexofenadine HCl (ALLEGRA PO), Take  by mouth., Disp: , Rfl:     fluticasone (VERAMYST) 27.5 MCG/SPRAY nasal spray,  "Administer 2 sprays into the nostril(s) as directed by provider Daily., Disp: , Rfl:     hydroCHLOROthiazide 25 MG tablet, Take 1 tablet by mouth Daily., Disp: 90 tablet, Rfl: 3    lovastatin (MEVACOR) 40 MG tablet, Take 1 tablet by mouth Every Night., Disp: 90 tablet, Rfl: 3    multivitamin with minerals tablet tablet, Take 1 tablet by mouth Daily., Disp: , Rfl:     pregabalin (Lyrica) 150 MG capsule, Take 1 capsule by mouth 2 (Two) Times a Day., Disp: 180 capsule, Rfl: 1    rOPINIRole (REQUIP) 4 MG tablet, Take 1 tablet by mouth Every Night., Disp: 90 tablet, Rfl: 1    valsartan (DIOVAN) 320 MG tablet, Take 1 tablet by mouth Daily., Disp: 90 tablet, Rfl: 3    Allergies:   Allergies   Allergen Reactions    Abilify [Aripiprazole] Other (See Comments)     tremors       Objective     Physical Exam: Please see above  Vital Signs:   Vitals:    01/31/25 1355   BP: 116/70   Pulse: 80   Temp: 98.6 °F (37 °C)   TempSrc: Infrared   SpO2: 98%   Weight: 117 kg (258 lb)   Height: 177.8 cm (70\")   PainSc:   5   PainLoc: Back     Body mass index is 37.02 kg/m².          Assessment / Plan      Assessment/Plan:   Diagnoses and all orders for this visit:    1. Annual physical exam (Primary)    2. Peripheral polyneuropathy  -     pregabalin (Lyrica) 150 MG capsule; Take 1 capsule by mouth 2 (Two) Times a Day.  Dispense: 180 capsule; Refill: 1    3. CY (obstructive sleep apnea)  -     amphetamine-dextroamphetamine (Adderall) 10 MG tablet; Take 1 tablet by mouth 3 (Three) Times a Day.  Dispense: 90 tablet; Refill: 0    4. Therapeutic drug monitoring  -     Compliance Drug Analysis, Ur - Urine, Clean Catch    5. Essential hypertension  -     CBC & Differential; Future  -     Comprehensive Metabolic Panel; Future  -     Cancel: Microalbumin / Creatinine Urine Ratio - Urine, Clean Catch; Future  -     TSH Rfx On Abnormal To Free T4; Future    6. Hyperlipidemia, unspecified hyperlipidemia type  -     Lipid Panel; Future    7. " Prediabetes  -     Hemoglobin A1c; Future  -     POC Microalbumin    8. Murmur  -     Adult Transthoracic Echo Complete W/ Cont if Necessary Per Protocol; Future    Annual exam counseling: Counseled patient in regards to diet and exercise.  Urged her to stay up-to-date with dental care, breast cancer screening colon cancer screening.  Ordered lab work as above.    Discussed chronic conditions as above including peripheral neuropathy and CY-patient is on controlled medications for these and has followed up appropriately.  She is doing well on treatment    Chronic diseases such as hypertension, hyperlipidemia and prediabetes are stable.      New murmur noted on exam.  Will get echo to evaluate further.  Patient does report some shortness of breath.  Will reach out if any concerning abnormalities but otherwise we will monitor after echo.  Will send patient to cardiology if any abnormalities need surgery or if patient's symptoms worsen    Follow Up:   Return in about 3 months (around 4/30/2025) for cy, Labs today.    Mauricio Frey DO  [facility] [place]

## 2025-02-01 LAB
BASOPHILS # BLD AUTO: 0.07 10*3/MM3 (ref 0–0.2)
BASOPHILS NFR BLD AUTO: 0.8 % (ref 0–1.5)
DEPRECATED RDW RBC AUTO: 38.6 FL (ref 37–54)
EOSINOPHIL # BLD AUTO: 0.32 10*3/MM3 (ref 0–0.4)
EOSINOPHIL NFR BLD AUTO: 3.6 % (ref 0.3–6.2)
ERYTHROCYTE [DISTWIDTH] IN BLOOD BY AUTOMATED COUNT: 12.8 % (ref 12.3–15.4)
HCT VFR BLD AUTO: 39.8 % (ref 34–46.6)
HGB BLD-MCNC: 13.3 G/DL (ref 12–15.9)
IMM GRANULOCYTES # BLD AUTO: 0.02 10*3/MM3 (ref 0–0.05)
IMM GRANULOCYTES NFR BLD AUTO: 0.2 % (ref 0–0.5)
LYMPHOCYTES # BLD AUTO: 2.09 10*3/MM3 (ref 0.7–3.1)
LYMPHOCYTES NFR BLD AUTO: 23.8 % (ref 19.6–45.3)
MCH RBC QN AUTO: 27.7 PG (ref 26.6–33)
MCHC RBC AUTO-ENTMCNC: 33.4 G/DL (ref 31.5–35.7)
MCV RBC AUTO: 82.7 FL (ref 79–97)
MONOCYTES # BLD AUTO: 0.72 10*3/MM3 (ref 0.1–0.9)
MONOCYTES NFR BLD AUTO: 8.2 % (ref 5–12)
NEUTROPHILS NFR BLD AUTO: 5.57 10*3/MM3 (ref 1.7–7)
NEUTROPHILS NFR BLD AUTO: 63.4 % (ref 42.7–76)
NRBC BLD AUTO-RTO: 0 /100 WBC (ref 0–0.2)
PLATELET # BLD AUTO: 266 10*3/MM3 (ref 140–450)
PMV BLD AUTO: 11.3 FL (ref 6–12)
RBC # BLD AUTO: 4.81 10*6/MM3 (ref 3.77–5.28)
WBC NRBC COR # BLD AUTO: 8.79 10*3/MM3 (ref 3.4–10.8)

## 2025-02-04 DIAGNOSIS — N17.9 AKI (ACUTE KIDNEY INJURY): Primary | ICD-10-CM

## 2025-02-07 LAB — DRUGS UR: NORMAL

## 2025-02-10 ENCOUNTER — PATIENT MESSAGE (OUTPATIENT)
Dept: FAMILY MEDICINE CLINIC | Facility: CLINIC | Age: 60
End: 2025-02-10
Payer: COMMERCIAL

## 2025-02-12 ENCOUNTER — HOSPITAL ENCOUNTER (OUTPATIENT)
Dept: CARDIOLOGY | Facility: HOSPITAL | Age: 60
Discharge: HOME | End: 2025-02-12
Admitting: FAMILY MEDICINE
Payer: COMMERCIAL

## 2025-02-12 VITALS
HEIGHT: 70 IN | BODY MASS INDEX: 36.93 KG/M2 | SYSTOLIC BLOOD PRESSURE: 142 MMHG | DIASTOLIC BLOOD PRESSURE: 78 MMHG | WEIGHT: 257.94 LBS

## 2025-02-12 DIAGNOSIS — R01.1 MURMUR: ICD-10-CM

## 2025-02-12 LAB
ASCENDING AORTA: 3.1 CM
AV MEAN PRESS GRAD SYS DOP V1V2: 10.6 MMHG
AV VMAX SYS DOP: 219.5 CM/SEC
BH CV ECHO MEAS - AO MAX PG: 19.3 MMHG
BH CV ECHO MEAS - AO ROOT DIAM: 3 CM
BH CV ECHO MEAS - AO V2 VTI: 40.8 CM
BH CV ECHO MEAS - IVS/LVPW: 1 CM
BH CV ECHO MEAS - IVSD: 1 CM
BH CV ECHO MEAS - LA DIMENSION: 3.8 CM
BH CV ECHO MEAS - LAT PEAK E' VEL: 13.9 CM/SEC
BH CV ECHO MEAS - LV MAX PG: 11.7 MMHG
BH CV ECHO MEAS - LV MEAN PG: 5.3 MMHG
BH CV ECHO MEAS - LV V1 MAX: 170.4 CM/SEC
BH CV ECHO MEAS - LV V1 VTI: 33.2 CM
BH CV ECHO MEAS - LVIDD: 4.7 CM
BH CV ECHO MEAS - LVIDS: 3.2 CM
BH CV ECHO MEAS - LVOT DIAM: 2 CM
BH CV ECHO MEAS - LVPWD: 1 CM
BH CV ECHO MEAS - MED PEAK E' VEL: 11.8 CM/SEC
BH CV ECHO MEAS - MV A MAX VEL: 90.6 CM/SEC
BH CV ECHO MEAS - MV DEC SLOPE: 428.7 CM/SEC2
BH CV ECHO MEAS - MV E MAX VEL: 99.7 CM/SEC
BH CV ECHO MEAS - MV E/A: 1.1
BH CV ECHO MEAS - MV MAX PG: 5.1 MMHG
BH CV ECHO MEAS - MV MEAN PG: 2.1 MMHG
BH CV ECHO MEAS - MV P1/2T: 78 MSEC
BH CV ECHO MEAS - MV V2 VTI: 31 CM
BH CV ECHO MEAS - MVA(P1/2T): 2.8 CM2
BH CV ECHO MEAS - PA ACC TIME: 0.13 SEC
BH CV ECHO MEAS - PA V2 MAX: 124 CM/SEC
BH CV ECHO MEAS - RAP SYSTOLE: 3 MMHG
BH CV ECHO MEAS - RVSP: 21 MMHG
BH CV ECHO MEAS - TAPSE (>1.6): 2.16 CM
BH CV ECHO MEAS - TR MAX PG: 18.2 MMHG
BH CV ECHO MEAS - TR MAX VEL: 213.3 CM/SEC
BH CV ECHO MEASUREMENTS AVERAGE E/E' RATIO: 7.76
BH CV VAS BP RIGHT ARM: NORMAL MMHG
BH CV XLRA - RV BASE: 3.2 CM
BH CV XLRA - RV LENGTH: 7 CM
BH CV XLRA - RV MID: 3.4 CM
LEFT ATRIUM VOLUME INDEX: 24.5 ML/M2
LV EF BIPLANE MOD: 65.5 %

## 2025-02-12 PROCEDURE — 93306 TTE W/DOPPLER COMPLETE: CPT

## 2025-02-27 ENCOUNTER — PATIENT MESSAGE (OUTPATIENT)
Dept: FAMILY MEDICINE CLINIC | Facility: CLINIC | Age: 60
End: 2025-02-27
Payer: COMMERCIAL

## 2025-02-28 ENCOUNTER — TELEMEDICINE (OUTPATIENT)
Dept: FAMILY MEDICINE CLINIC | Facility: CLINIC | Age: 60
End: 2025-02-28
Payer: COMMERCIAL

## 2025-02-28 DIAGNOSIS — J06.0 ACUTE LARYNGOPHARYNGITIS: Primary | ICD-10-CM

## 2025-02-28 PROCEDURE — 99213 OFFICE O/P EST LOW 20 MIN: CPT | Performed by: FAMILY MEDICINE

## 2025-02-28 RX ORDER — METHYLPREDNISOLONE 4 MG/1
TABLET ORAL
Qty: 21 TABLET | Refills: 0 | Status: SHIPPED | OUTPATIENT
Start: 2025-02-28

## 2025-02-28 RX ORDER — IPRATROPIUM BROMIDE 21 UG/1
2 SPRAY, METERED NASAL EVERY 12 HOURS
Qty: 30 ML | Refills: 5 | Status: SHIPPED | OUTPATIENT
Start: 2025-02-28

## 2025-02-28 NOTE — PROGRESS NOTES
Follow Up Office Visit      Patient Name: Zoë Dominguez  : 1965   MRN: [id number]     Chief Complaint:    Chief Complaint   Patient presents with    URI       History of Present Illness: Zoë Dominguez is a 59 y.o. female who is here today to follow up with URI with change in voice.  She has a lot of mucus coming out of the screen.  She does not feel too sick.  She has been sick since Wednesday she and she gets this couple times a year      Patient confirmed their consent for this visit. They also confirmed their name, , and location in [place]. video and audio used for this visit.  Patient located inside her car in [place].  Weida located in his office in [place].  No technical difficulties occurred        Physical exam that was not performed      Subjective        I have reviewed and the following portions of the patient's history were updated as appropriate: past family history, past medical history, past social history, past surgical history and problem list.    Medications:     Current Outpatient Medications:     amLODIPine (NORVASC) 10 MG tablet, Take 1 tablet by mouth Daily., Disp: 90 tablet, Rfl: 3    amoxicillin-clavulanate (AUGMENTIN) 875-125 MG per tablet, Take 1 tablet by mouth 2 (Two) Times a Day., Disp: 14 tablet, Rfl: 0    amphetamine-dextroamphetamine (Adderall) 10 MG tablet, Take 1 tablet by mouth 3 (Three) Times a Day., Disp: 90 tablet, Rfl: 0    ASPIRIN 81 PO, Take  by mouth., Disp: , Rfl:     buPROPion XL (Wellbutrin XL) 300 MG 24 hr tablet, Take 1 tablet by mouth Daily., Disp: 90 tablet, Rfl: 3    escitalopram (Lexapro) 10 MG tablet, Take 1 tablet by mouth Daily., Disp: 90 tablet, Rfl: 3    Fexofenadine HCl (ALLEGRA PO), Take  by mouth., Disp: , Rfl:     fluticasone (VERAMYST) 27.5 MCG/SPRAY nasal spray, Administer 2 sprays into the nostril(s) as directed by provider Daily., Disp: , Rfl:     hydroCHLOROthiazide 25 MG tablet, Take 1 tablet by mouth Daily., Disp: 90  tablet, Rfl: 3    ipratropium (ATROVENT) 0.03 % nasal spray, Administer 2 sprays into the nostril(s) as directed by provider Every 12 (Twelve) Hours., Disp: 30 mL, Rfl: 5    lovastatin (MEVACOR) 40 MG tablet, Take 1 tablet by mouth Every Night., Disp: 90 tablet, Rfl: 3    methylPREDNISolone (MEDROL) 4 MG dose pack, Take as directed on package instructions., Disp: 21 tablet, Rfl: 0    multivitamin with minerals tablet tablet, Take 1 tablet by mouth Daily., Disp: , Rfl:     pregabalin (Lyrica) 150 MG capsule, Take 1 capsule by mouth 2 (Two) Times a Day., Disp: 180 capsule, Rfl: 1    rOPINIRole (REQUIP) 4 MG tablet, Take 1 tablet by mouth Every Night., Disp: 90 tablet, Rfl: 1    valsartan (DIOVAN) 320 MG tablet, Take 1 tablet by mouth Daily., Disp: 90 tablet, Rfl: 3    Allergies:   Allergies   Allergen Reactions    Abilify [Aripiprazole] Other (See Comments)     tremors       Objective     Physical Exam: Please see above  Vital Signs: There were no vitals filed for this visit.  There is no height or weight on file to calculate BMI.          Assessment / Plan      Assessment/Plan:   Diagnoses and all orders for this visit:    1. Acute laryngopharyngitis (Primary)  -     methylPREDNISolone (MEDROL) 4 MG dose pack; Take as directed on package instructions.  Dispense: 21 tablet; Refill: 0  -     ipratropium (ATROVENT) 0.03 % nasal spray; Administer 2 sprays into the nostril(s) as directed by provider Every 12 (Twelve) Hours.  Dispense: 30 mL; Refill: 5  -     amoxicillin-clavulanate (AUGMENTIN) 875-125 MG per tablet; Take 1 tablet by mouth 2 (Two) Times a Day.  Dispense: 14 tablet; Refill: 0    Start antibiotic in 2 days if not improving.  Start Medrol now for improvement of laryngitis.  Atrovent for mucus production.  Any worsening signs or not getting better-please reach out.  Patient can return to work on Monday    Follow Up:   No follow-ups on file.    Mauricio Frey DO  [facility] [place]

## 2025-03-12 DIAGNOSIS — E78.5 HYPERLIPIDEMIA, UNSPECIFIED HYPERLIPIDEMIA TYPE: ICD-10-CM

## 2025-03-12 RX ORDER — LOVASTATIN 40 MG/1
40 TABLET ORAL NIGHTLY
Qty: 90 TABLET | Refills: 0 | Status: SHIPPED | OUTPATIENT
Start: 2025-03-12

## 2025-04-13 ENCOUNTER — PATIENT MESSAGE (OUTPATIENT)
Dept: FAMILY MEDICINE CLINIC | Facility: CLINIC | Age: 60
End: 2025-04-13
Payer: COMMERCIAL

## 2025-04-16 DIAGNOSIS — G47.33 OSA (OBSTRUCTIVE SLEEP APNEA): ICD-10-CM

## 2025-04-16 RX ORDER — DEXTROAMPHETAMINE SACCHARATE, AMPHETAMINE ASPARTATE, DEXTROAMPHETAMINE SULFATE AND AMPHETAMINE SULFATE 2.5; 2.5; 2.5; 2.5 MG/1; MG/1; MG/1; MG/1
10 TABLET ORAL 3 TIMES DAILY
Qty: 90 TABLET | Refills: 0 | Status: SHIPPED | OUTPATIENT
Start: 2025-04-16

## 2025-04-21 DIAGNOSIS — G25.81 RESTLESS LEG SYNDROME: ICD-10-CM

## 2025-04-21 RX ORDER — ROPINIROLE 4 MG/1
4 TABLET, FILM COATED ORAL NIGHTLY
Qty: 90 TABLET | Refills: 0 | Status: SHIPPED | OUTPATIENT
Start: 2025-04-21

## 2025-04-23 ENCOUNTER — HOSPITAL ENCOUNTER (OUTPATIENT)
Facility: HOSPITAL | Age: 60
Discharge: HOME | End: 2025-04-23
Admitting: NURSE PRACTITIONER
Payer: COMMERCIAL

## 2025-04-23 DIAGNOSIS — Z12.31 SCREENING MAMMOGRAM FOR BREAST CANCER: ICD-10-CM

## 2025-04-23 PROCEDURE — 77067 SCR MAMMO BI INCL CAD: CPT

## 2025-04-23 PROCEDURE — 77063 BREAST TOMOSYNTHESIS BI: CPT

## 2025-05-06 ENCOUNTER — OFFICE VISIT (OUTPATIENT)
Dept: FAMILY MEDICINE CLINIC | Facility: CLINIC | Age: 60
End: 2025-05-06
Payer: COMMERCIAL

## 2025-05-06 VITALS
RESPIRATION RATE: 20 BRPM | WEIGHT: 263.4 LBS | SYSTOLIC BLOOD PRESSURE: 120 MMHG | HEIGHT: 70 IN | TEMPERATURE: 98.7 F | DIASTOLIC BLOOD PRESSURE: 70 MMHG | HEART RATE: 76 BPM | OXYGEN SATURATION: 98 % | BODY MASS INDEX: 37.71 KG/M2

## 2025-05-06 DIAGNOSIS — G47.33 OSA (OBSTRUCTIVE SLEEP APNEA): ICD-10-CM

## 2025-05-06 DIAGNOSIS — G62.9 PERIPHERAL POLYNEUROPATHY: ICD-10-CM

## 2025-05-06 DIAGNOSIS — J06.9 UPPER RESPIRATORY TRACT INFECTION, UNSPECIFIED TYPE: Primary | ICD-10-CM

## 2025-05-06 PROCEDURE — 99214 OFFICE O/P EST MOD 30 MIN: CPT | Performed by: FAMILY MEDICINE

## 2025-05-06 RX ORDER — DOXYCYCLINE 100 MG/1
100 CAPSULE ORAL 2 TIMES DAILY
Qty: 20 CAPSULE | Refills: 0 | Status: SHIPPED | OUTPATIENT
Start: 2025-05-06

## 2025-05-06 RX ORDER — PREGABALIN 150 MG/1
150 CAPSULE ORAL 2 TIMES DAILY
Qty: 180 CAPSULE | Refills: 1 | Status: SHIPPED | OUTPATIENT
Start: 2025-05-06

## 2025-05-06 RX ORDER — DEXTROAMPHETAMINE SACCHARATE, AMPHETAMINE ASPARTATE, DEXTROAMPHETAMINE SULFATE AND AMPHETAMINE SULFATE 3.75; 3.75; 3.75; 3.75 MG/1; MG/1; MG/1; MG/1
15 TABLET ORAL 2 TIMES DAILY
Qty: 60 TABLET | Refills: 0 | Status: SHIPPED | OUTPATIENT
Start: 2025-05-06

## 2025-05-06 NOTE — PROGRESS NOTES
Follow Up Office Visit      Patient Name: Zoë Dominguez  : 1965   MRN: [id number]     Chief Complaint:    Chief Complaint   Patient presents with    Hypertension       History of Present Illness: Zoë Dominguez is a 59 y.o. female who is here today to follow up with URI that started last week, hypertension, and CY.  Her tiredness during the daytime this related to her CY is not controlled.  She says that she feels like she is getting used to the Adderall dose.  She takes Adderall 10 mg 3 times daily and does miss the third dose at times when she does not need it later in the day.  She has not been on a higher dose before.  We reviewed risk of abuse and addiction today.  Patient does have a drug screen on file      URI started about a week ago.  She is already on allergy medication.  She feels sick, her symptoms are worsening.  She has a cough, and feels sick    Patient also has neuropathy which is controlled on Lyrica.      Physical exam: Lungs CTA bilateral.  Bilateral ear exam shows bulging and red TM on the right and normal TM on the left.  Oropharynx with mild erythema in the posterior aspect    Subjective        I have reviewed and the following portions of the patient's history were updated as appropriate: past family history, past medical history, past social history, past surgical history and problem list.    Medications:     Current Outpatient Medications:     amLODIPine (NORVASC) 10 MG tablet, Take 1 tablet by mouth Daily., Disp: 90 tablet, Rfl: 3    amphetamine-dextroamphetamine (Adderall) 10 MG tablet, Take 1 tablet by mouth 3 (Three) Times a Day., Disp: 90 tablet, Rfl: 0    ASPIRIN 81 PO, Take  by mouth., Disp: , Rfl:     buPROPion XL (Wellbutrin XL) 300 MG 24 hr tablet, Take 1 tablet by mouth Daily., Disp: 90 tablet, Rfl: 3    escitalopram (Lexapro) 10 MG tablet, Take 1 tablet by mouth Daily., Disp: 90 tablet, Rfl: 3    Fexofenadine HCl (ALLEGRA PO), Take  by mouth., Disp:  ", Rfl:     fluticasone (VERAMYST) 27.5 MCG/SPRAY nasal spray, Administer 2 sprays into the nostril(s) as directed by provider Daily., Disp: , Rfl:     hydroCHLOROthiazide 25 MG tablet, Take 1 tablet by mouth Daily., Disp: 90 tablet, Rfl: 3    lovastatin (MEVACOR) 40 MG tablet, Take 1 tablet by mouth Every Night., Disp: 90 tablet, Rfl: 0    multivitamin with minerals tablet tablet, Take 1 tablet by mouth Daily., Disp: , Rfl:     pregabalin (Lyrica) 150 MG capsule, Take 1 capsule by mouth 2 (Two) Times a Day., Disp: 180 capsule, Rfl: 1    rOPINIRole (REQUIP) 4 MG tablet, Take 1 tablet by mouth Every Night., Disp: 90 tablet, Rfl: 0    valsartan (DIOVAN) 320 MG tablet, Take 1 tablet by mouth Daily., Disp: 90 tablet, Rfl: 3    amphetamine-dextroamphetamine (Adderall) 15 MG tablet, Take 1 tablet by mouth 2 (Two) Times a Day., Disp: 60 tablet, Rfl: 0    doxycycline (MONODOX) 100 MG capsule, Take 1 capsule by mouth 2 (Two) Times a Day., Disp: 20 capsule, Rfl: 0    Allergies:   Allergies   Allergen Reactions    Abilify [Aripiprazole] Other (See Comments)     tremors       Objective     Physical Exam: Please see above  Vital Signs:   Vitals:    05/06/25 1631   BP: 120/70   BP Location: Right arm   Patient Position: Sitting   Cuff Size: Adult   Pulse: 76   Resp: 20   Temp: 98.7 °F (37.1 °C)   TempSrc: Temporal   SpO2: 98%   Weight: 119 kg (263 lb 6.4 oz)   Height: 177.8 cm (70\")   PainSc: 0-No pain     Body mass index is 37.79 kg/m².          Assessment / Plan      Assessment/Plan:   Diagnoses and all orders for this visit:    1. Upper respiratory tract infection, unspecified type (Primary)  -     doxycycline (MONODOX) 100 MG capsule; Take 1 capsule by mouth 2 (Two) Times a Day.  Dispense: 20 capsule; Refill: 0    2. CY (obstructive sleep apnea)  -     amphetamine-dextroamphetamine (Adderall) 15 MG tablet; Take 1 tablet by mouth 2 (Two) Times a Day.  Dispense: 60 tablet; Refill: 0    3. Peripheral polyneuropathy  -     " pregabalin (Lyrica) 150 MG capsule; Take 1 capsule by mouth 2 (Two) Times a Day.  Dispense: 180 capsule; Refill: 1    Neuropathy controlled.  Continue Lyrica.  Drug screen and contract on file    CY symptoms not controlled-increase Adderall to 15 mg twice daily.  Follow-up in 3 months or sooner as needed    URI discussed again today-will treat with doxycycline.  Likely this is a secondary infection at the start is a viral or allergy attack.    Follow Up:   Return in about 3 months (around 8/6/2025) for cy.    Mauricio Frey DO  [facility] [place]

## 2025-06-06 DIAGNOSIS — E78.5 HYPERLIPIDEMIA, UNSPECIFIED HYPERLIPIDEMIA TYPE: ICD-10-CM

## 2025-06-06 RX ORDER — LOVASTATIN 40 MG/1
40 TABLET ORAL NIGHTLY
Qty: 90 TABLET | Refills: 0 | Status: SHIPPED | OUTPATIENT
Start: 2025-06-06

## 2025-07-15 ENCOUNTER — PATIENT MESSAGE (OUTPATIENT)
Dept: FAMILY MEDICINE CLINIC | Facility: CLINIC | Age: 60
End: 2025-07-15
Payer: COMMERCIAL